# Patient Record
Sex: FEMALE | Race: WHITE | NOT HISPANIC OR LATINO | Employment: OTHER | URBAN - METROPOLITAN AREA
[De-identification: names, ages, dates, MRNs, and addresses within clinical notes are randomized per-mention and may not be internally consistent; named-entity substitution may affect disease eponyms.]

---

## 2017-01-23 ENCOUNTER — OFFICE VISIT (OUTPATIENT)
Dept: FAMILY MEDICINE | Facility: CLINIC | Age: 82
End: 2017-01-23
Payer: MEDICARE

## 2017-01-23 VITALS
WEIGHT: 119.94 LBS | HEIGHT: 59 IN | HEART RATE: 83 BPM | BODY MASS INDEX: 24.18 KG/M2 | DIASTOLIC BLOOD PRESSURE: 60 MMHG | OXYGEN SATURATION: 92 % | SYSTOLIC BLOOD PRESSURE: 100 MMHG

## 2017-01-23 DIAGNOSIS — L97.909 VENOUS STASIS ULCER: Primary | ICD-10-CM

## 2017-01-23 DIAGNOSIS — Z91.81 RISK FOR FALLS: ICD-10-CM

## 2017-01-23 DIAGNOSIS — Z78.0 ASYMPTOMATIC POSTMENOPAUSAL STATE: ICD-10-CM

## 2017-01-23 DIAGNOSIS — F41.9 ANXIETY: ICD-10-CM

## 2017-01-23 DIAGNOSIS — F13.20 BENZODIAZEPINE DEPENDENCE: ICD-10-CM

## 2017-01-23 DIAGNOSIS — I42.8 NICM (NONISCHEMIC CARDIOMYOPATHY): ICD-10-CM

## 2017-01-23 DIAGNOSIS — I83.009 VENOUS STASIS ULCER: Primary | ICD-10-CM

## 2017-01-23 DIAGNOSIS — S39.92XA BACK INJURY, INITIAL ENCOUNTER: ICD-10-CM

## 2017-01-23 PROCEDURE — 1157F ADVNC CARE PLAN IN RCRD: CPT | Mod: S$GLB,,,

## 2017-01-23 PROCEDURE — 99999 PR PBB SHADOW E&M-EST. PATIENT-LVL IV: CPT | Mod: PBBFAC,,,

## 2017-01-23 PROCEDURE — 1160F RVW MEDS BY RX/DR IN RCRD: CPT | Mod: S$GLB,,,

## 2017-01-23 PROCEDURE — 99204 OFFICE O/P NEW MOD 45 MIN: CPT | Mod: S$GLB,,,

## 2017-01-23 PROCEDURE — 1159F MED LIST DOCD IN RCRD: CPT | Mod: S$GLB,,,

## 2017-01-23 PROCEDURE — 1125F AMNT PAIN NOTED PAIN PRSNT: CPT | Mod: S$GLB,,,

## 2017-01-23 RX ORDER — TEMAZEPAM 7.5 MG/1
7.5 CAPSULE ORAL NIGHTLY PRN
Qty: 30 CAPSULE | Refills: 0 | Status: SHIPPED | OUTPATIENT
Start: 2017-01-23 | End: 2017-02-22

## 2017-01-23 RX ORDER — FUROSEMIDE 40 MG/1
40 TABLET ORAL 2 TIMES DAILY
Qty: 60 TABLET | Refills: 11 | Status: SHIPPED | OUTPATIENT
Start: 2017-01-23 | End: 2017-02-22 | Stop reason: SDUPTHER

## 2017-01-23 RX ORDER — LORAZEPAM 1 MG/1
1 TABLET ORAL DAILY
Qty: 30 TABLET | Refills: 0 | Status: SHIPPED | OUTPATIENT
Start: 2017-01-23 | End: 2017-03-13 | Stop reason: SDUPTHER

## 2017-01-23 RX ORDER — ESCITALOPRAM OXALATE 10 MG/1
10 TABLET ORAL DAILY
Qty: 30 TABLET | Refills: 11 | Status: SHIPPED | OUTPATIENT
Start: 2017-01-23 | End: 2018-02-20

## 2017-01-23 RX ORDER — ZINC GLUCONATE 50 MG
50 TABLET ORAL 2 TIMES DAILY
COMMUNITY
End: 2017-02-22

## 2017-01-23 RX ORDER — SPIRONOLACTONE 25 MG/1
25 TABLET ORAL DAILY
COMMUNITY
End: 2017-01-23 | Stop reason: SDUPTHER

## 2017-01-23 RX ORDER — SPIRONOLACTONE 25 MG/1
25 TABLET ORAL 2 TIMES DAILY
Qty: 60 TABLET | Refills: 11 | Status: SHIPPED | OUTPATIENT
Start: 2017-01-23 | End: 2017-02-22 | Stop reason: SDUPTHER

## 2017-01-23 RX ORDER — ESCITALOPRAM OXALATE 10 MG/1
10 TABLET ORAL NIGHTLY
Qty: 30 TABLET | Refills: 11 | Status: CANCELLED | OUTPATIENT
Start: 2017-01-23 | End: 2018-01-23

## 2017-01-23 NOTE — PROGRESS NOTES
Subjective:       Patient ID: Gloria Zuleta is a 87 y.o. female.    Chief Complaint: Wound Infection and Fall (back and hip pain)    HPI The patient has an ulcer on her right lower leg for 2 weeks but it started seeping about 3 days ago. She thinks she may have hit it.       She fell twice this weekend. She is taking temazepam and 15 mg and lorazepam 1 mg twice a day. She is protein malnourished.       She has CHF and reportedly has an EF of 15%.       She is complaining of pain in her lower thoracic to upper lumbar spine since falling.           Review of Systems   Constitutional: Positive for chills. Negative for fatigue, fever and unexpected weight change.   HENT: Negative.    Eyes: Negative.    Respiratory: Positive for cough, shortness of breath and wheezing.    Cardiovascular: Positive for palpitations and leg swelling. Negative for chest pain.   Gastrointestinal: Positive for constipation. Negative for blood in stool, diarrhea, nausea and vomiting.   Endocrine: Negative.    Genitourinary: Negative.    Musculoskeletal: Positive for arthralgias, joint swelling and myalgias.   Skin: Negative.    Allergic/Immunologic: Negative.    Neurological: Negative.  Negative for dizziness, seizures, syncope and light-headedness.   Hematological: Negative.    Psychiatric/Behavioral: Negative.    All other systems reviewed and are negative.      Objective:      Vitals:    01/23/17 1540   BP: 100/60   Pulse: 83     Physical Exam   Constitutional: She is oriented to person, place, and time. She appears well-developed and well-nourished. She is active.  Non-toxic appearance. She does not have a sickly appearance. She does not appear ill. No distress.   HENT:   Head: Normocephalic and atraumatic.   Right Ear: External ear normal.   Left Ear: External ear normal.   Nose: Nose normal.   Hearing normal.    Eyes: Conjunctivae are normal. Pupils are equal, round, and reactive to light.   Neck: Normal range of motion. Neck  supple. No thyroid mass and no thyromegaly present.   Cardiovascular: Normal rate, regular rhythm, normal heart sounds and intact distal pulses.  Exam reveals no gallop and no friction rub.    No murmur heard.  Pulses:       Dorsalis pedis pulses are 2+ on the right side, and 2+ on the left side.        Posterior tibial pulses are 2+ on the right side, and 2+ on the left side.   Pulmonary/Chest: Effort normal. No respiratory distress. She has wheezes. She has rales. She exhibits no tenderness.   Musculoskeletal: Normal range of motion. She exhibits edema and tenderness.   Over the lower thoracic spine.    Lymphadenopathy:     She has no cervical adenopathy.   Neurological: She is alert and oriented to person, place, and time. She has normal strength. Coordination and gait normal.   Skin: Skin is warm and dry. She is not diaphoretic. No pallor.   Psychiatric: She has a normal mood and affect. Her speech is normal and behavior is normal. Judgment and thought content normal. Cognition and memory are normal.   Vitals reviewed.                Assessment:       1. Venous stasis ulcer    2. Asymptomatic postmenopausal state    3. Back injury, initial encounter    4. Risk for falls    5. Benzodiazepine dependence    6. Anxiety    7. NICM (nonischemic cardiomyopathy)        Plan:       Venous stasis ulcer  -     Ambulatory referral to Home Health    Asymptomatic postmenopausal state  -     DXA Bone Density Spine And Hip; Future; Expected date: 1/23/17    Back injury, initial encounter  -     X-Ray Thoracic Spine AP Lateral; Future; Expected date: 1/23/17  -     X-Ray Lumbar Spine AP And Lateral; Future; Expected date: 1/23/17    Risk for falls    Benzodiazepine dependence    Anxiety  -     escitalopram oxalate (LEXAPRO) 10 MG tablet; Take 1 tablet (10 mg total) by mouth once daily.  Dispense: 30 tablet; Refill: 11    NICM (nonischemic cardiomyopathy)    Other orders  -     spironolactone (ALDACTONE) 25 MG tablet; Take 1  tablet (25 mg total) by mouth 2 (two) times daily.  Dispense: 60 tablet; Refill: 11  -     furosemide (LASIX) 40 MG tablet; Take 1 tablet (40 mg total) by mouth 2 (two) times daily.  Dispense: 60 tablet; Refill: 11  -     temazepam (RESTORIL) 7.5 MG Cap; Take 1 capsule (7.5 mg total) by mouth nightly as needed.  Dispense: 30 capsule; Refill: 0  -     lorazepam (ATIVAN) 1 MG tablet; Take 1 tablet (1 mg total) by mouth once daily.  Dispense: 30 tablet; Refill: 0  -     Cancel: escitalopram oxalate (LEXAPRO) 10 MG tablet; Take 1 tablet (10 mg total) by mouth every evening.  Dispense: 30 tablet; Refill: 11      Return in about 4 weeks (around 2/20/2017).

## 2017-01-23 NOTE — MR AVS SNAPSHOT
[facility]  [address]  [place] [address]  Phone: [phone]  Fax: [phone]                  Gloria Zuleta   2017 3:40 PM   Office Visit    Description:  Female : 1929   Provider:  Robert William Jr., MD   Department:  [facility]           Reason for Visit     Wound Infection     Fall           Diagnoses this Visit        Comments    Venous stasis ulcer    -  Primary     Asymptomatic postmenopausal state         Back injury, initial encounter         Risk for falls         Benzodiazepine dependence         Anxiety         NICM (nonischemic cardiomyopathy)                To Do List           Goals (5 Years of Data)     None      Follow-Up and Disposition     Return in about 4 weeks (around 2017).    Follow-up and Disposition History       These Medications        Disp Refills Start End    spironolactone (ALDACTONE) 25 MG tablet 60 tablet 11 2017     Take 1 tablet (25 mg total) by mouth 2 (two) times daily. - Oral    Pharmacy: [facility] [address] Ph #: [phone]       furosemide (LASIX) 40 MG tablet 60 tablet 11 2017     Take 1 tablet (40 mg total) by mouth 2 (two) times daily. - Oral    Pharmacy: [facility] [address] Ph #: [phone]       temazepam (RESTORIL) 7.5 MG Cap 30 capsule 0 2017    Take 1 capsule (7.5 mg total) by mouth nightly as needed. - Oral    Pharmacy: [facility] [address] Ph #: [phone]       lorazepam (ATIVAN) 1 MG tablet 30 tablet 0 2017     Take 1 tablet (1 mg total) by mouth once daily. - Oral    Pharmacy: [facility] [address] Ph #: [phone]       escitalopram oxalate (LEXAPRO) 10 MG tablet 30 tablet 11 2017    Take 1 tablet (10 mg total) by mouth once daily. - Oral    Pharmacy: [facility] [address]   #: [phone]         [facility]     [facility] Care [facility] - 24/7 Assistance  Registered nurses in the [facility] provide clinical advisement, health education, appointment booking, and other advisory services.  Call for this free service at [phone].             Medications           Message regarding Medications     Verify the changes and/or additions to your medication regime listed below are the same as discussed with your clinician today.  If any of these changes or additions are incorrect, please notify your healthcare provider.        START taking these NEW medications        Refills    spironolactone (ALDACTONE) 25 MG tablet 11    Sig: Take 1 tablet (25 mg total) by mouth 2 (two) times daily.    Class: Normal    Route: Oral    temazepam (RESTORIL) 7.5 MG Cap 0    Sig: Take 1 capsule (7.5 mg total) by mouth nightly as needed.    Class: Print    Route: Oral    escitalopram oxalate (LEXAPRO) 10 MG tablet 11    Sig: Take 1 tablet (10 mg total) by mouth once daily.    Class: Normal    Route: Oral      CHANGE how you are taking these medications     Start Taking Instead of    furosemide (LASIX) 40 MG tablet furosemide (LASIX) 40 MG tablet    Dosage:  Take 1 tablet (40 mg total) by mouth 2 (two) times daily. Dosage:  40 mg 2 (two) times daily.     Reason for Change:  Reorder     lorazepam (ATIVAN) 1 MG tablet lorazepam (ATIVAN) 1 MG tablet    Dosage:  Take 1 tablet (1 mg total) by mouth once daily. Dosage:  Take 1 mg by mouth 2 (two) times daily.    Reason for Change:  Reorder            Verify that the below list of medications is an accurate representation of the medications you are currently taking.  If none reported, the list may be blank. If incorrect, please contact your healthcare provider. Carry this list with you in case of emergency.           Current Medications     ascorbic acid, vitamin C, (VITAMIN C) 500 MG tablet Take 500 mg by mouth once daily.    aspirin 81 MG  "Chew Take 1 tablet (81 mg total) by mouth once daily.    atorvastatin (LIPITOR) 40 MG tablet Take 1 tablet (40 mg total) by mouth once daily.    clopidogrel (PLAVIX) 75 mg tablet every other day.     furosemide (LASIX) 40 MG tablet Take 1 tablet (40 mg total) by mouth 2 (two) times daily.    lactulose (CHRONULAC) 10 gram/15 mL solution     lisinopril 10 MG tablet Take 1 tablet (10 mg total) by mouth once daily.    lorazepam (ATIVAN) 1 MG tablet Take 1 tablet (1 mg total) by mouth once daily.    MULTIVIT-IRON-MIN-FOLIC ACID 3,500-18-0.4 UNIT-MG-MG ORAL CHEW Take by mouth.    MV,CA,MIN/IRON FUM/FA/VIT K (MULTI FOR HER ORAL) Take 1 tablet by mouth once daily.    potassium chloride SA (K-DUR,KLOR-CON) 20 MEQ tablet Take 1 tablet by mouth once daily.    spironolactone (ALDACTONE) 25 MG tablet Take 1 tablet (25 mg total) by mouth 2 (two) times daily.    tamsulosin (FLOMAX) 0.4 mg Cp24 Take 1 capsule (0.4 mg total) by mouth once daily.    tolterodine (DETROL LA) 4 MG 24 hr capsule Take 1 capsule (4 mg total) by mouth once daily.    zinc gluconate 50 mg tablet Take 50 mg by mouth 2 (two) times daily.    escitalopram oxalate (LEXAPRO) 10 MG tablet Take 1 tablet (10 mg total) by mouth once daily.    temazepam (RESTORIL) 7.5 MG Cap Take 1 capsule (7.5 mg total) by mouth nightly as needed.           Clinical Reference Information           Vital Signs - Last Recorded  Most recent update: 1/23/2017  3:48 PM by Philly Jean-Baptiste MA    BP Pulse Ht Wt SpO2 BMI    100/60 (BP Location: Right arm, Patient Position: Sitting, BP Method: Manual) 83 4' 11" (1.499 m) 54.4 kg (119 lb 14.9 oz) (!) 92% 24.22 kg/m2      Blood Pressure          Most Recent Value    BP  100/60      Allergies as of 1/23/2017     Bactrim [Sulfamethoxazole-trimethoprim]    Codeine    Cortisone    Keflex [Cephalexin]    Pcn [Penicillins]    Sulfadiazine    Sumycin 250      Immunizations Administered on Date of Encounter - 1/23/2017     None      Orders Placed During " Today's Visit      Normal Orders This Visit    Ambulatory referral to Home Health     Future Labs/Procedures Expected by Expires    DXA Bone Density Spine And Hip  1/23/2017 1/23/2018    X-Ray Lumbar Spine AP And Lateral  1/23/2017 1/23/2018    X-Ray Thoracic Spine AP Lateral  1/23/2017 1/23/2018

## 2017-01-25 ENCOUNTER — TELEPHONE (OUTPATIENT)
Dept: FAMILY MEDICINE | Facility: CLINIC | Age: 82
End: 2017-01-25

## 2017-01-25 NOTE — TELEPHONE ENCOUNTER
Home health nurse wants to what type compression, any medication to legs? Do you want Jose Duque?

## 2017-01-25 NOTE — TELEPHONE ENCOUNTER
----- Message from Nellie Gomes sent at 1/25/2017  9:42 AM CST -----  Contact: [facility].[phone]  At patient's home now.Need wound care orders asap.

## 2017-01-25 NOTE — TELEPHONE ENCOUNTER
Per nurse, there is not a sore significant enough for the insurance to pay for Unna boot. Pt care giver has been putting neosporin on the site and coban, do you want her to continue with that, please advise.

## 2017-01-25 NOTE — TELEPHONE ENCOUNTER
When I evaluated her she had a stasis ulcer and profuse serous drainage. I suggest compression dressings. If they are not willing to perform this service I will recommend a wound referral. Please advise.

## 2017-01-27 ENCOUNTER — TELEPHONE (OUTPATIENT)
Dept: FAMILY MEDICINE | Facility: CLINIC | Age: 82
End: 2017-01-27

## 2017-01-27 DIAGNOSIS — M54.50 ACUTE MIDLINE LOW BACK PAIN WITHOUT SCIATICA: Primary | ICD-10-CM

## 2017-01-27 NOTE — TELEPHONE ENCOUNTER
Change the dressings twice a week and don't do anything in between except elevate legs when possible.

## 2017-01-27 NOTE — TELEPHONE ENCOUNTER
Pt daughter calling to asking if they have to change the compression dressing 7 days a week? Stated the legs are not weeping anymore and insurance is only sending nurse twice a week to change is. What do they need to do in between?  Please advise.     Also the Temazepam was $131 for the script. Pts daughter wants to know how they can keep the cost down and if there is an alternate.

## 2017-01-27 NOTE — TELEPHONE ENCOUNTER
----- Message from Nellie Gomes sent at 1/27/2017 10:27 AM CST -----  Contact: [phone]  Calling about a medication that pharmacy would not fill.

## 2017-01-27 NOTE — TELEPHONE ENCOUNTER
It should be changed twice a week. If temazepam is too expensive it should be discontinued and she can take lorazepam at bedtime until it is discontinued.

## 2017-01-27 NOTE — TELEPHONE ENCOUNTER
Pt daughter calling to asking if they have to change the compression dressing 7 days a week? Legs are not weeping anymore, home health nurse visits twice a week to change is. What do they need to do in between? Please advise.

## 2017-01-30 NOTE — TELEPHONE ENCOUNTER
"Notified daughter in law and she asked, and what can be done after the CT spine is done for the "possible fracture", please advise  "

## 2017-01-30 NOTE — TELEPHONE ENCOUNTER
Simple. I tried to reduce temazepam and was told it was too effective. Since she is already taking lorazepam she should not have withdrawals. She should take the lorazepam once a day either in the morning or afternoon.

## 2017-01-30 NOTE — TELEPHONE ENCOUNTER
Per daughter in law, dr. ji did not want to discontinued temazepam until she slowly wean off, and the pt was suppose to take lorazepam in the am, what going to happen if she don't take temazepam? You want pt take lorazepam in morning and evening? She is very confused, please clarify

## 2017-01-31 DIAGNOSIS — R35.0 URINARY FREQUENCY: ICD-10-CM

## 2017-01-31 DIAGNOSIS — R35.1 NOCTURIA: ICD-10-CM

## 2017-01-31 RX ORDER — TOLTERODINE 4 MG/1
CAPSULE, EXTENDED RELEASE ORAL
Qty: 30 CAPSULE | Refills: 11 | Status: SHIPPED | OUTPATIENT
Start: 2017-01-31 | End: 2018-02-20

## 2017-01-31 RX ORDER — TAMSULOSIN HYDROCHLORIDE 0.4 MG/1
CAPSULE ORAL
Qty: 30 CAPSULE | Refills: 11 | Status: SHIPPED | OUTPATIENT
Start: 2017-01-31 | End: 2018-02-20

## 2017-02-01 ENCOUNTER — TELEPHONE (OUTPATIENT)
Dept: FAMILY MEDICINE | Facility: CLINIC | Age: 82
End: 2017-02-01

## 2017-02-01 NOTE — TELEPHONE ENCOUNTER
Notified Helga, she said she will stop temazepam and will call back if the pt experiences any problems.

## 2017-02-01 NOTE — TELEPHONE ENCOUNTER
----- Message from Coral Mosher sent at 2/1/2017 10:47 AM CST -----  Contact: Deplhine [phone]  Delphine with [employer] is calling to let the Dr know pt had a fall. Her oxygen level was 74 and after 20 minutes it is 98. Delphine can be reached at [phone].

## 2017-02-01 NOTE — TELEPHONE ENCOUNTER
Notified Daughter in law Helga that she can stop the temazepam but she said no, doctor told her not to stop cold turkey because she will get the shakes, and she will not stop the med because she doesn't want the patient to experience the shakes. Also she has not filled the 7.5mg temazepam because of the cost. Please advise.  She states pt still is taking 15mg temazepam nightly, escitalopram oxalate (LEXAPRO) 10 MG tablet, Take 1 tablet (10 mg total) by mouth in the evening and lorazepam in the morning.

## 2017-02-03 ENCOUNTER — TELEPHONE (OUTPATIENT)
Dept: FAMILY MEDICINE | Facility: CLINIC | Age: 82
End: 2017-02-03

## 2017-02-03 NOTE — TELEPHONE ENCOUNTER
----- Message from Coral Mosher sent at 2/3/2017  3:38 PM CST -----  Contact: Helga [phone]  Pt daughter in law Helga dumont refuse to do the CT because no one can tell her what treatment will be done if she takes the test. She stated the pt is elderly and has breast cancer and does not need to have this done. She can be reached at [phone].

## 2017-02-03 NOTE — TELEPHONE ENCOUNTER
If she has a fracture of her spine it could be treated with a procedure called kyphoplasty which can eliminate the pain. If she is not continuing to have severe pain in her spine then there would be little benefit to performing the procedure.

## 2017-02-07 NOTE — TELEPHONE ENCOUNTER
----- Message from Lizzette Salmeron sent at 2/7/2017  4:22 PM CST -----  Daughter in law// lv   [phone]  Calling for andrew     Needs refill of   Meds// CLOPIDOGREL 75 mg  Takes one every other day       Needs one month called to local pharmacy  [facility]  And a 3 month script [facility]

## 2017-02-08 ENCOUNTER — TELEPHONE (OUTPATIENT)
Dept: FAMILY MEDICINE | Facility: CLINIC | Age: 82
End: 2017-02-08

## 2017-02-08 RX ORDER — CLOPIDOGREL BISULFATE 75 MG/1
75 TABLET ORAL ONCE
Qty: 30 TABLET | Refills: 11 | Status: SHIPPED | OUTPATIENT
Start: 2017-02-08 | End: 2017-02-08 | Stop reason: SDUPTHER

## 2017-02-08 RX ORDER — CLOPIDOGREL BISULFATE 75 MG/1
75 TABLET ORAL ONCE
Qty: 90 TABLET | Refills: 3 | Status: SHIPPED | OUTPATIENT
Start: 2017-02-08 | End: 2018-01-27

## 2017-02-08 RX ORDER — CLOPIDOGREL BISULFATE 75 MG/1
75 TABLET ORAL ONCE
Qty: 90 TABLET | Refills: 3 | Status: SHIPPED | OUTPATIENT
Start: 2017-02-08 | End: 2017-02-08 | Stop reason: SDUPTHER

## 2017-02-08 NOTE — TELEPHONE ENCOUNTER
----- Message from Coral Mosher sent at 2/8/2017  2:29 PM CST -----  Contact: [facility] nurse [phone]  [facility] home health nurse is calling to get a script for a Roho Cushion for pt wheelchair. Please make sure name, diag, and account ([id number]) should be on script. Please fax to [facility] [phone].

## 2017-02-09 NOTE — TELEPHONE ENCOUNTER
I have spoken to Edie and she states patient is weak after fall yesterday and has o2 sats of 82 that mariama to 84% after deep breathing. Patient is hypotensive. i have advised edie on informing family she needs to be evaluated by ED. Edie verbalized understanding.

## 2017-02-09 NOTE — TELEPHONE ENCOUNTER
----- Message from Coral Mosher sent at 2/9/2017  3:50 PM CST -----  Contact: Brianne [phone]  Brianne with [facility] because pt is feeling weak, blood pressure 76/54 and 80/50 the next reading. Pulse ox is 82. Please call [phone].

## 2017-02-09 NOTE — TELEPHONE ENCOUNTER
----- Message from Lizzette Salmeron sent at 2/9/2017  9:57 AM CST -----  [facility]///Delphine   [phone]  Calling to report a fall     Last night               Bruising on ear and eye  Right side of body  No  external blood noted

## 2017-02-15 ENCOUNTER — TELEPHONE (OUTPATIENT)
Dept: FAMILY MEDICINE | Facility: CLINIC | Age: 82
End: 2017-02-15

## 2017-02-15 NOTE — TELEPHONE ENCOUNTER
Contact: [facility] nurse [phone]  [facility] home health nurse is calling to get a script for a Roho Cushion for pt wheelchair. Please make sure name, diag: wound buttocks/ bed sore and account ([id number]) should be on script.   Please fax to [facility] [phone].

## 2017-02-16 ENCOUNTER — TELEPHONE (OUTPATIENT)
Dept: FAMILY MEDICINE | Facility: CLINIC | Age: 82
End: 2017-02-16

## 2017-02-16 NOTE — TELEPHONE ENCOUNTER
Anca with [facility] phone# [phone], O2 stats @ room air sitting up is 78-87%, wheezing and cough. Deep breaths have not improved O2 stats. Also reports pt fell this am, hit side of head no laceration, denies headache or pain. Please advise

## 2017-02-16 NOTE — TELEPHONE ENCOUNTER
----- Message from Lizzette Salmeron sent at 2/16/2017  3:43 PM CST -----  [facility] calling  For nurse

## 2017-02-22 ENCOUNTER — OFFICE VISIT (OUTPATIENT)
Dept: FAMILY MEDICINE | Facility: CLINIC | Age: 82
End: 2017-02-22
Payer: MEDICARE

## 2017-02-22 ENCOUNTER — TELEPHONE (OUTPATIENT)
Dept: FAMILY MEDICINE | Facility: CLINIC | Age: 82
End: 2017-02-22

## 2017-02-22 VITALS
SYSTOLIC BLOOD PRESSURE: 90 MMHG | HEIGHT: 59 IN | DIASTOLIC BLOOD PRESSURE: 60 MMHG | OXYGEN SATURATION: 81 % | HEART RATE: 53 BPM

## 2017-02-22 DIAGNOSIS — F02.818 DEMENTIA ASSOCIATED WITH OTHER UNDERLYING DISEASE WITH BEHAVIORAL DISTURBANCE: ICD-10-CM

## 2017-02-22 DIAGNOSIS — I42.8 NICM (NONISCHEMIC CARDIOMYOPATHY): ICD-10-CM

## 2017-02-22 DIAGNOSIS — I25.10 CAD IN NATIVE ARTERY: Primary | ICD-10-CM

## 2017-02-22 PROBLEM — R32 URINARY INCONTINENCE: Status: ACTIVE | Noted: 2017-02-22

## 2017-02-22 PROBLEM — E87.5 HYPERKALEMIA: Status: ACTIVE | Noted: 2017-02-22

## 2017-02-22 PROBLEM — N17.9 AKI (ACUTE KIDNEY INJURY): Status: ACTIVE | Noted: 2017-02-22

## 2017-02-22 PROBLEM — I95.9 HYPOTENSION: Status: ACTIVE | Noted: 2017-02-22

## 2017-02-22 PROBLEM — E86.0 DEHYDRATION: Status: ACTIVE | Noted: 2017-02-22

## 2017-02-22 PROBLEM — R79.89 ELEVATED TROPONIN: Status: ACTIVE | Noted: 2017-02-22

## 2017-02-22 PROBLEM — K62.3 RECTAL PROLAPSE: Status: ACTIVE | Noted: 2017-02-22

## 2017-02-22 PROCEDURE — 99999 PR PBB SHADOW E&M-EST. PATIENT-LVL III: CPT | Mod: PBBFAC,,,

## 2017-02-22 PROCEDURE — 1159F MED LIST DOCD IN RCRD: CPT | Mod: S$GLB,,,

## 2017-02-22 PROCEDURE — 1160F RVW MEDS BY RX/DR IN RCRD: CPT | Mod: S$GLB,,,

## 2017-02-22 PROCEDURE — 99214 OFFICE O/P EST MOD 30 MIN: CPT | Mod: S$GLB,,,

## 2017-02-22 PROCEDURE — 1126F AMNT PAIN NOTED NONE PRSNT: CPT | Mod: S$GLB,,,

## 2017-02-22 PROCEDURE — 1157F ADVNC CARE PLAN IN RCRD: CPT | Mod: S$GLB,,,

## 2017-02-22 RX ORDER — FUROSEMIDE 40 MG/1
40 TABLET ORAL DAILY
Qty: 60 TABLET | Refills: 11 | Status: ON HOLD | OUTPATIENT
Start: 2017-02-22 | End: 2017-02-26

## 2017-02-22 RX ORDER — SPIRONOLACTONE 25 MG/1
25 TABLET ORAL DAILY
Qty: 60 TABLET | Refills: 11 | Status: ON HOLD | OUTPATIENT
Start: 2017-02-22 | End: 2017-03-15

## 2017-02-22 NOTE — MR AVS SNAPSHOT
[facility]  [address] [facility] [place] [place] [address]  Phone: [phone]  Fax: [phone]                  Gloria Zuleta   2017 8:20 AM   Office Visit    Description:  Female : 1929   Provider:  Robert William Jr., MD   Department:  [facility]           Reason for Visit     Follow-up           Diagnoses this Visit        Comments    CAD in native artery    -  Primary     NICM (nonischemic cardiomyopathy)         Dementia associated with other underlying disease with behavioral disturbance                To Do List           Future Appointments        Provider Department Dept Phone    3/1/2017 9:20 AM Al Whitley MD [facility] [phone]      Goals (5 Years of Data)     None      Follow-Up and Disposition     Return if symptoms worsen or fail to improve.    Follow-up and Disposition History       These Medications        Disp Refills Start End    spironolactone (ALDACTONE) 25 MG tablet 60 tablet 11 2017     Take 1 tablet (25 mg total) by mouth once daily. - Oral    Pharmacy: [facility] [address] Ph #: [phone]       furosemide (LASIX) 40 MG tablet 60 tablet 11 2017     Take 1 tablet (40 mg total) by mouth once daily. - Oral    [facility] [place] [facility] [address] Ph #: [phone]         [facility] On Call     [facility] On Call Nurse Care Line -  Assistance  Registered nurses in the [facility] On Call Center provide clinical advisement, health education, appointment booking, and other advisory services.  Call for this free service at [phone].             Medications           Message regarding Medications     Verify the changes and/or additions to your medication regime listed below are the same as discussed with your clinician today.  If any of these changes or additions are incorrect, please notify your healthcare provider.        CHANGE how you are  taking these medications     Start Taking Instead of    spironolactone (ALDACTONE) 25 MG tablet spironolactone (ALDACTONE) 25 MG tablet    Dosage:  Take 1 tablet (25 mg total) by mouth once daily. Dosage:  Take 1 tablet (25 mg total) by mouth 2 (two) times daily.    Reason for Change:  Reorder     furosemide (LASIX) 40 MG tablet furosemide (LASIX) 40 MG tablet    Dosage:  Take 1 tablet (40 mg total) by mouth once daily. Dosage:  Take 1 tablet (40 mg total) by mouth 2 (two) times daily.    Reason for Change:  Reorder       STOP taking these medications     zinc gluconate 50 mg tablet Take 50 mg by mouth 2 (two) times daily.    lactulose (CHRONULAC) 10 gram/15 mL solution     temazepam (RESTORIL) 7.5 MG Cap Take 1 capsule (7.5 mg total) by mouth nightly as needed.           Verify that the below list of medications is an accurate representation of the medications you are currently taking.  If none reported, the list may be blank. If incorrect, please contact your healthcare provider. Carry this list with you in case of emergency.           Current Medications     ascorbic acid, vitamin C, (VITAMIN C) 500 MG tablet Take 500 mg by mouth once daily.    aspirin 81 MG Chew Take 1 tablet (81 mg total) by mouth once daily.    atorvastatin (LIPITOR) 40 MG tablet Take 1 tablet (40 mg total) by mouth once daily.    clopidogrel (PLAVIX) 75 mg tablet Take 1 tablet (75 mg total) by mouth once.    escitalopram oxalate (LEXAPRO) 10 MG tablet Take 1 tablet (10 mg total) by mouth once daily.    furosemide (LASIX) 40 MG tablet Take 1 tablet (40 mg total) by mouth once daily.    lisinopril 10 MG tablet Take 1 tablet (10 mg total) by mouth once daily.    lorazepam (ATIVAN) 1 MG tablet Take 1 tablet (1 mg total) by mouth once daily.    MULTIVIT-IRON-MIN-FOLIC ACID 3,500-18-0.4 UNIT-MG-MG ORAL CHEW Take by mouth.    MV,CA,MIN/IRON FUM/FA/VIT K (MULTI FOR HER ORAL) Take 1 tablet by mouth once daily.    potassium chloride SA (K-DUR,KLOR-CON)  "20 MEQ tablet Take 1 tablet by mouth once daily.    spironolactone (ALDACTONE) 25 MG tablet Take 1 tablet (25 mg total) by mouth once daily.    tamsulosin (FLOMAX) 0.4 mg Cp24 TAKE ONE CAPSULE BY MOUTH DAILY.    tolterodine (DETROL LA) 4 MG 24 hr capsule TAKE ONE CAPSULE BY MOUTH DAILY.           Clinical Reference Information           Your Vitals Were     BP Pulse Height SpO2          90/60 (BP Location: Right arm, Patient Position: Sitting, BP Method: Manual) 53 4' 11" (1.499 m) 81%        Blood Pressure          Most Recent Value    BP  90/60      Allergies as of 2/22/2017     Bactrim [Sulfamethoxazole-trimethoprim]    Codeine    Cortisone    Keflex [Cephalexin]    Pcn [Penicillins]    Sulfadiazine    Sumycin 250      Immunizations Administered on Date of Encounter - 2/22/2017     None      [facility] Sign-Up     Activating your [facility] account is as easy as 1-2-3!     1) Visit [email or web address], select Sign Up Now, enter this activation code and your date of birth, then select Next.  Activation code not generated  Current Patient Portal Status: Account disabled      2) Create a username and password to use when you visit [facility] in the future and select a security question in case you lose your password and select Next.    3) Enter your e-mail address and click Sign Up!    Additional Information  If you have questions, please e-mail [email or web address] or call [phone] to talk to our [facility] staff. Remember, [facility] is NOT to be used for urgent needs. For medical emergencies, dial 911.         Language Assistance Services     ATTENTION: Language assistance services are available, free of charge. Please call [phone].      ATENCIÓN: Si habla español, tiene a de leon disposición servicios gratuitos de asistencia lingüística. Llame al [phone].     HAIR Ý: N?u b?n nói Ti?ng Vi?t, có các d?ch v? h? tr? ngôn ng? mi?n phí dành cho b?n. G?i s? [phone].         [facility] complies " with applicable Federal civil rights laws and does not discriminate on the basis of race, color, national origin, age, disability, or sex.

## 2017-02-22 NOTE — PROGRESS NOTES
Subjective:       Patient ID: Gloria Zuleta is a 87 y.o. female.    Chief Complaint: Follow-up    HPI The patient presents for medical management of her chronic medical problems including dementia, venous stasis, benzo dependency, hypertension and her other medical problems.     Review of Systems   Constitutional: Negative.    Respiratory: Negative.  Negative for shortness of breath.    Cardiovascular: Negative.  Negative for chest pain.   Psychiatric/Behavioral: Negative.    All other systems reviewed and are negative.      Objective:      Vitals:    02/22/17 0821   BP: 90/60   Pulse: (!) 53     Physical Exam   Constitutional: She is oriented to person, place, and time. She appears well-developed and well-nourished. She is active.  Non-toxic appearance. She does not have a sickly appearance. She does not appear ill. No distress.   HENT:   Head: Normocephalic and atraumatic.   Right Ear: External ear normal.   Left Ear: External ear normal.   Nose: Nose normal.   Hearing normal.    Eyes: Conjunctivae are normal. Pupils are equal, round, and reactive to light.   Neck: Normal range of motion. Neck supple. No thyroid mass and no thyromegaly present.   Cardiovascular: Normal rate, regular rhythm, normal heart sounds and intact distal pulses.  Exam reveals no gallop and no friction rub.    No murmur heard.  Pulses:       Dorsalis pedis pulses are 2+ on the right side, and 2+ on the left side.        Posterior tibial pulses are 2+ on the right side, and 2+ on the left side.   Pulmonary/Chest: Effort normal and breath sounds normal. No respiratory distress. She exhibits no tenderness.   Musculoskeletal: Normal range of motion. She exhibits no edema.   Lymphadenopathy:     She has no cervical adenopathy.   Neurological: She is alert and oriented to person, place, and time. She has normal strength. Coordination and gait normal.   Skin: Skin is warm and dry. She is not diaphoretic. No pallor.   Psychiatric: She has a  normal mood and affect. Her speech is normal and behavior is normal. Judgment and thought content normal. Cognition and memory are normal.   Vitals reviewed.      Assessment:       1. CAD in native artery    2. NICM (nonischemic cardiomyopathy)    3. Dementia associated with other underlying disease with behavioral disturbance        Plan:       CAD in native artery    NICM (nonischemic cardiomyopathy)    Dementia associated with other underlying disease with behavioral disturbance    Other orders  -     spironolactone (ALDACTONE) 25 MG tablet; Take 1 tablet (25 mg total) by mouth once daily.  Dispense: 60 tablet; Refill: 11  -     furosemide (LASIX) 40 MG tablet; Take 1 tablet (40 mg total) by mouth once daily.  Dispense: 60 tablet; Refill: 11      Return if symptoms worsen or fail to improve.

## 2017-02-22 NOTE — TELEPHONE ENCOUNTER
Anca with [employer] phone# [phone], O2 stat at room air is 74%, and she will try and get patient to go to the ER, in the past patient refused

## 2017-02-23 PROBLEM — E43 SEVERE MALNUTRITION: Status: ACTIVE | Noted: 2017-02-23

## 2017-02-23 PROBLEM — R82.90 ABNORMAL URINALYSIS: Status: ACTIVE | Noted: 2017-02-23

## 2017-02-24 PROBLEM — N39.0 URINARY TRACT INFECTION WITHOUT HEMATURIA: Status: ACTIVE | Noted: 2017-02-23

## 2017-02-26 PROBLEM — I95.9 HYPOTENSION: Status: RESOLVED | Noted: 2017-02-22 | Resolved: 2017-02-26

## 2017-02-26 PROBLEM — E87.5 HYPERKALEMIA: Status: RESOLVED | Noted: 2017-02-22 | Resolved: 2017-02-26

## 2017-03-06 ENCOUNTER — TELEPHONE (OUTPATIENT)
Dept: FAMILY MEDICINE | Facility: CLINIC | Age: 82
End: 2017-03-06

## 2017-03-06 RX ORDER — CARVEDILOL 3.12 MG/1
3.12 TABLET ORAL 2 TIMES DAILY
Refills: 11 | COMMUNITY
Start: 2017-02-20 | End: 2018-02-20

## 2017-03-06 RX ORDER — LOSARTAN POTASSIUM 25 MG/1
TABLET ORAL
Refills: 11 | COMMUNITY
Start: 2017-02-20 | End: 2017-03-13

## 2017-03-06 NOTE — TELEPHONE ENCOUNTER
----- Message from Nellie Gomes sent at 3/6/2017 10:15 AM CST -----  Contact: daughter.[phone]  Checking on papers brought in Friday that need filling in.need papers back today.

## 2017-03-10 ENCOUNTER — TELEPHONE (OUTPATIENT)
Dept: FAMILY MEDICINE | Facility: CLINIC | Age: 82
End: 2017-03-10

## 2017-03-10 DIAGNOSIS — Z11.1 SCREENING-PULMONARY TB: Primary | ICD-10-CM

## 2017-03-10 NOTE — TELEPHONE ENCOUNTER
----- Message from Coral Mosher sent at 3/10/2017  1:10 PM CST -----  Contact: Helga ifsher [phone]  Pt has an appt on Monday and would like to have a chest xray and Tb test done prior to appt. Please call Helga at [phone].

## 2017-03-10 NOTE — TELEPHONE ENCOUNTER
----- Message from Poppy Humphrey LPN sent at 3/10/2017  4:59 PM CST -----  Need TB blood test order changed

## 2017-03-13 ENCOUNTER — TELEPHONE (OUTPATIENT)
Dept: FAMILY MEDICINE | Facility: CLINIC | Age: 82
End: 2017-03-13

## 2017-03-13 ENCOUNTER — OFFICE VISIT (OUTPATIENT)
Dept: FAMILY MEDICINE | Facility: CLINIC | Age: 82
End: 2017-03-13
Payer: MEDICARE

## 2017-03-13 VITALS
HEIGHT: 59 IN | HEART RATE: 46 BPM | SYSTOLIC BLOOD PRESSURE: 90 MMHG | DIASTOLIC BLOOD PRESSURE: 60 MMHG | OXYGEN SATURATION: 88 %

## 2017-03-13 DIAGNOSIS — K62.3 RECTAL PROLAPSE: ICD-10-CM

## 2017-03-13 DIAGNOSIS — Z29.9 PREVENTIVE MEASURE: ICD-10-CM

## 2017-03-13 DIAGNOSIS — N17.9 ACUTE KIDNEY INJURY: Primary | ICD-10-CM

## 2017-03-13 DIAGNOSIS — I42.8 NICM (NONISCHEMIC CARDIOMYOPATHY): ICD-10-CM

## 2017-03-13 PROBLEM — N39.0 URINARY TRACT INFECTION WITHOUT HEMATURIA: Status: RESOLVED | Noted: 2017-02-23 | Resolved: 2017-03-13

## 2017-03-13 PROBLEM — R79.89 ELEVATED TROPONIN: Status: RESOLVED | Noted: 2017-02-22 | Resolved: 2017-03-13

## 2017-03-13 PROBLEM — E43 SEVERE MALNUTRITION: Status: RESOLVED | Noted: 2017-02-23 | Resolved: 2017-03-13

## 2017-03-13 PROCEDURE — 99214 OFFICE O/P EST MOD 30 MIN: CPT | Mod: S$GLB,,,

## 2017-03-13 PROCEDURE — 1126F AMNT PAIN NOTED NONE PRSNT: CPT | Mod: S$GLB,,,

## 2017-03-13 PROCEDURE — 90670 PCV13 VACCINE IM: CPT | Mod: S$GLB,,,

## 2017-03-13 PROCEDURE — 1159F MED LIST DOCD IN RCRD: CPT | Mod: S$GLB,,,

## 2017-03-13 PROCEDURE — 1157F ADVNC CARE PLAN IN RCRD: CPT | Mod: S$GLB,,,

## 2017-03-13 PROCEDURE — 1160F RVW MEDS BY RX/DR IN RCRD: CPT | Mod: S$GLB,,,

## 2017-03-13 PROCEDURE — G0009 ADMIN PNEUMOCOCCAL VACCINE: HCPCS | Mod: S$GLB,,,

## 2017-03-13 PROCEDURE — 99999 PR PBB SHADOW E&M-EST. PATIENT-LVL III: CPT | Mod: PBBFAC,,,

## 2017-03-13 RX ORDER — LORAZEPAM 0.5 MG/1
0.5 TABLET ORAL DAILY
Qty: 30 TABLET | Refills: 0 | Status: SHIPPED | OUTPATIENT
Start: 2017-03-13 | End: 2018-02-20

## 2017-03-13 RX ORDER — FERROUS SULFATE 325(65) MG
325 TABLET ORAL
Refills: 0 | COMMUNITY
Start: 2017-03-13 | End: 2018-02-20

## 2017-03-13 NOTE — MR AVS SNAPSHOT
[facility]  [address]  [place] [address]  Phone: [phone]  Fax: [phone]                  Gloria Zuleta   3/13/2017 3:20 PM   Office Visit    Description:  Female : 1929   Provider:  Robert William Jr., MD   Department:  [facility]           Reason for Visit     Hospital Follow Up           Diagnoses this Visit        Comments    Acute kidney injury    -  Primary     NICM (nonischemic cardiomyopathy)         Rectal prolapse         Preventive measure                To Do List           Goals (5 Years of Data)     None      Follow-Up and Disposition     Return in about 4 weeks (around 4/10/2017).       These Medications        Disp Refills Start End    lorazepam (ATIVAN) 0.5 MG tablet 30 tablet 0 3/13/2017     Take 1 tablet (0.5 mg total) by mouth once daily. - Oral    Pharmacy: [facility] [address] Ph #: [phone]         PURCHASE these Medications (No prescription required)        Start End    ferrous sulfate 325 mg (65 mg iron) Tab tablet 3/13/2017     Sig - Route: Take 1 tablet (325 mg total) by mouth daily with breakfast. - Oral    Class: OTC      [facility]     [facility] Care [facility] -  Assistance  Registered nurses in the [facility] provide clinical advisement, health education, appointment booking, and other advisory services.  Call for this free service at [phone].             Medications           Message regarding Medications     Verify the changes and/or additions to your medication regime listed below are the same as discussed with your clinician today.  If any of these changes or additions are incorrect, please notify your healthcare provider.        START taking these NEW medications        Refills    ferrous sulfate 325 mg (65 mg iron) Tab tablet 0    Sig: Take 1 tablet (325 mg total) by mouth daily with breakfast.    Class: OTC    Route: Oral       CHANGE how you are taking these medications     Start Taking Instead of    lorazepam (ATIVAN) 0.5 MG tablet lorazepam (ATIVAN) 1 MG tablet    Dosage:  Take 1 tablet (0.5 mg total) by mouth once daily. Dosage:  Take 1 tablet (1 mg total) by mouth once daily.    Reason for Change:  Reorder       STOP taking these medications     losartan (COZAAR) 25 MG tablet            Verify that the below list of medications is an accurate representation of the medications you are currently taking.  If none reported, the list may be blank. If incorrect, please contact your healthcare provider. Carry this list with you in case of emergency.           Current Medications     ascorbic acid, vitamin C, (VITAMIN C) 500 MG tablet Take 500 mg by mouth once daily.    aspirin 81 MG Chew Take 1 tablet (81 mg total) by mouth once daily.    atorvastatin (LIPITOR) 40 MG tablet Take 1 tablet (40 mg total) by mouth once daily.    carvedilol (COREG) 3.125 MG tablet 3.125 mg 2 (two) times daily.     escitalopram oxalate (LEXAPRO) 10 MG tablet Take 1 tablet (10 mg total) by mouth once daily.    furosemide (LASIX) 20 MG tablet Take 1 tablet (20 mg total) by mouth once daily.    lisinopril (PRINIVIL,ZESTRIL) 5 MG tablet Take 1 tablet (5 mg total) by mouth once daily.    lorazepam (ATIVAN) 0.5 MG tablet Take 1 tablet (0.5 mg total) by mouth once daily.    MULTIVIT-IRON-MIN-FOLIC ACID 3,500-18-0.4 UNIT-MG-MG ORAL CHEW Take by mouth.    spironolactone (ALDACTONE) 25 MG tablet Take 1 tablet (25 mg total) by mouth once daily.    tamsulosin (FLOMAX) 0.4 mg Cp24 TAKE ONE CAPSULE BY MOUTH DAILY.    tolterodine (DETROL LA) 4 MG 24 hr capsule TAKE ONE CAPSULE BY MOUTH DAILY.    clopidogrel (PLAVIX) 75 mg tablet Take 1 tablet (75 mg total) by mouth once.    ferrous sulfate 325 mg (65 mg iron) Tab tablet Take 1 tablet (325 mg total) by mouth daily with breakfast.    potassium bicarbonate (K-LYTE) disintegrating tablet Take 25 mEq by mouth 2 (two) times daily.  "          Clinical Reference Information           Your Vitals Were     BP Pulse Height SpO2          90/60 (BP Location: Right arm, Patient Position: Sitting) 46 4' 11" (1.499 m) 88%        Blood Pressure          Most Recent Value    BP  90/60      Allergies as of 3/13/2017     Bactrim [Sulfamethoxazole-trimethoprim]    Codeine    Cortisone    Keflex [Cephalexin]    Pcn [Penicillins]    Sulfadiazine    Sumycin 250      Immunizations Administered on Date of Encounter - 3/13/2017     Name Date Dose VIS Date Route    Pneumococcal Conjugate - 13 Valent  Incomplete 0.5 mL 11/5/2015 Intramuscular      Orders Placed During Today's Visit      Normal Orders This Visit    Pneumococcal Conjugate Vaccine (13 Valent) (IM)       Language Assistance Services     ATTENTION: Language assistance services are available, free of charge. Please call [phone].      ATENCIÓN: Si edgardola ana, tiene a de leon disposición servicios gratuitos de asistencia lingüística. Llame al [phone].     CHÚ Ý: N?u b?n nói Ti?ng Vi?t, có các d?ch v? h? tr? ngôn ng? mi?n phí dành cho b?n. G?i s? [phone].         [facility] complies with applicable Federal civil rights laws and does not discriminate on the basis of race, color, national origin, age, disability, or sex.        "

## 2017-03-13 NOTE — PROGRESS NOTES
Subjective:       Patient ID: Gloria Zuleta is a 87 y.o. female.    Chief Complaint: Hospital Follow Up    HPI  The patient presents after a recent hospitalization at [facility] on February 22, 2017. She had acute kidney injury and hyperkalemia. Her malnutrition has improved.     Review of Systems   Constitutional: Positive for appetite change. Negative for activity change and unexpected weight change.   HENT: Negative.  Negative for congestion, trouble swallowing and voice change.    Eyes: Negative.    Respiratory: Negative.  Negative for cough and shortness of breath.    Cardiovascular: Negative.  Negative for chest pain.   Gastrointestinal: Positive for abdominal pain.        C/O black stools   Not anemic    Endocrine: Negative.    Genitourinary: Negative.    Musculoskeletal: Negative.    Skin: Negative.    Allergic/Immunologic: Negative.    Neurological: Negative.    Hematological: Negative.    Psychiatric/Behavioral: Positive for dysphoric mood. Negative for agitation.   All other systems reviewed and are negative.      Objective:      Vitals:    03/13/17 1600   BP: 90/60   Pulse: (!) 46     Physical Exam   Constitutional: She is oriented to person, place, and time. She appears well-developed and well-nourished. She is active.  Non-toxic appearance. She does not have a sickly appearance. She does not appear ill. No distress.   HENT:   Head: Normocephalic and atraumatic.   Right Ear: External ear normal.   Left Ear: External ear normal.   Nose: Nose normal.   Hearing normal.    Eyes: Conjunctivae are normal. Pupils are equal, round, and reactive to light.   Neck: Normal range of motion. Neck supple. No thyroid mass and no thyromegaly present.   Cardiovascular: Normal rate, regular rhythm, normal heart sounds and intact distal pulses.  Exam reveals no gallop and no friction rub.    No murmur heard.  Pulses:       Dorsalis pedis pulses are 2+ on the right side, and 2+ on the left side.         Posterior tibial pulses are 2+ on the right side, and 2+ on the left side.   Pulmonary/Chest: Effort normal and breath sounds normal. No respiratory distress. She exhibits no tenderness.   Musculoskeletal: Normal range of motion. She exhibits no edema.   Lymphadenopathy:     She has no cervical adenopathy.   Neurological: She is alert and oriented to person, place, and time. She has normal strength. Coordination and gait normal.   Skin: Skin is warm and dry. She is not diaphoretic. No pallor.   Psychiatric: She has a normal mood and affect. Her speech is normal and behavior is normal. Judgment and thought content normal. Cognition and memory are normal.   Vitals reviewed.      Assessment:       1. Acute kidney injury    2. NICM (nonischemic cardiomyopathy)    3. Rectal prolapse    4. Preventive measure        Plan:       Acute kidney injury    NICM (nonischemic cardiomyopathy)    Rectal prolapse    Preventive measure  -     Pneumococcal Conjugate Vaccine (13 Valent) (IM)    Other orders  -     lorazepam (ATIVAN) 0.5 MG tablet; Take 1 tablet (0.5 mg total) by mouth once daily.  Dispense: 30 tablet; Refill: 0  -     ferrous sulfate 325 mg (65 mg iron) Tab tablet; Take 1 tablet (325 mg total) by mouth daily with breakfast.; Refill: 0      Return in about 4 weeks (around 4/10/2017).      Reduce alprazolam again then discontinue

## 2017-03-14 PROBLEM — L89.152 SACRAL DECUBITUS ULCER, STAGE II: Status: ACTIVE | Noted: 2017-03-14

## 2017-03-15 ENCOUNTER — TELEPHONE (OUTPATIENT)
Dept: FAMILY MEDICINE | Facility: CLINIC | Age: 82
End: 2017-03-15

## 2017-03-22 ENCOUNTER — TELEPHONE (OUTPATIENT)
Dept: FAMILY MEDICINE | Facility: CLINIC | Age: 82
End: 2017-03-22

## 2017-03-22 NOTE — TELEPHONE ENCOUNTER
----- Message from Coral Mosher sent at 3/22/2017 10:18 AM CDT -----  Contact: Manjinder mcwilliams [phone]  Pt is check the status on paperwork for nursing home. Please call him at [phone].

## 2017-03-23 ENCOUNTER — TELEPHONE (OUTPATIENT)
Dept: FAMILY MEDICINE | Facility: CLINIC | Age: 82
End: 2017-03-23

## 2017-03-23 NOTE — TELEPHONE ENCOUNTER
----- Message from Lizzette Salmeron sent at 3/23/2017  1:03 PM CDT -----  Son//Manjinder is calling again about paper work to be admitted to nursing home   States brought this about a week ago and would like to pick it up     Reach at   [phone]     Home//[phone]

## 2017-03-27 ENCOUNTER — TELEPHONE (OUTPATIENT)
Dept: FAMILY MEDICINE | Facility: CLINIC | Age: 82
End: 2017-03-27

## 2017-03-27 NOTE — TELEPHONE ENCOUNTER
----- Message from Lizzette Salmeron sent at 3/27/2017  2:16 PM CDT -----  Wants doctor to note patient has a bed sore on her rectum that has been there for years   Wants doctor to note this so it can be treated when admitted  Per the son

## 2017-03-27 NOTE — TELEPHONE ENCOUNTER
----- Message from Nellie Gomes sent at 3/27/2017  8:13 AM CDT -----  Contact: poppy.[phone]  Calling about paperwork for patient to be admitted to nursing home.papers have been here for 10 days.

## 2017-04-05 ENCOUNTER — TELEPHONE (OUTPATIENT)
Dept: FAMILY MEDICINE | Facility: CLINIC | Age: 82
End: 2017-04-05

## 2017-04-05 RX ORDER — LOSARTAN POTASSIUM 25 MG/1
TABLET ORAL
Refills: 11 | COMMUNITY
Start: 2017-03-17 | End: 2018-02-20

## 2017-04-05 NOTE — TELEPHONE ENCOUNTER
----- Message from Coral Mosher sent at 4/5/2017  1:44 PM CDT -----  Contact: pt [phone]  Sharonda with [facility] is calling to get information about pt having 32 oz fluid retention. Please call her at [phone].

## 2017-04-05 NOTE — TELEPHONE ENCOUNTER
Blank was unavailable notified Kassidy that Dr. William did not order fluid restriction, she verbalized understanding.

## 2017-04-05 NOTE — TELEPHONE ENCOUNTER
----- Message from Lizzette Salmeron sent at 4/5/2017 10:23 AM CDT -----  [facility] called ----Blank    [phone]  Blank calling to find out if this patient is on a 32 ounce daily fluid restriction diet

## 2017-08-25 ENCOUNTER — TELEPHONE (OUTPATIENT)
Dept: FAMILY MEDICINE | Facility: CLINIC | Age: 82
End: 2017-08-25

## 2017-08-25 NOTE — TELEPHONE ENCOUNTER
----- Message from Nellie Goems sent at 8/25/2017  1:56 PM CDT -----  Contact: daughter in law.[phone]  Need immunization records.has she had shingles or pneumonia shots.

## 2018-02-14 ENCOUNTER — TELEPHONE (OUTPATIENT)
Dept: FAMILY MEDICINE | Facility: CLINIC | Age: 83
End: 2018-02-14

## 2018-02-14 NOTE — TELEPHONE ENCOUNTER
----- Message from Shun Woodard sent at 2/14/2018 12:18 PM CST -----  Contact: [phone] Helga pt daughter  Patient daughter wanted to speak with the nurse to verify if the patient need to do blood work prior to her appt on 02/20. Please call and advise.

## 2018-02-20 ENCOUNTER — OFFICE VISIT (OUTPATIENT)
Dept: FAMILY MEDICINE | Facility: CLINIC | Age: 83
End: 2018-02-20
Payer: MEDICARE

## 2018-02-20 VITALS
HEART RATE: 68 BPM | SYSTOLIC BLOOD PRESSURE: 110 MMHG | OXYGEN SATURATION: 97 % | HEIGHT: 59 IN | DIASTOLIC BLOOD PRESSURE: 64 MMHG

## 2018-02-20 DIAGNOSIS — Z86.73 HISTORY OF CVA IN ADULTHOOD: ICD-10-CM

## 2018-02-20 DIAGNOSIS — I10 ESSENTIAL HYPERTENSION: ICD-10-CM

## 2018-02-20 DIAGNOSIS — I42.8 NICM (NONISCHEMIC CARDIOMYOPATHY): ICD-10-CM

## 2018-02-20 DIAGNOSIS — I25.10 CAD IN NATIVE ARTERY: ICD-10-CM

## 2018-02-20 DIAGNOSIS — F13.20 BENZODIAZEPINE DEPENDENCE: ICD-10-CM

## 2018-02-20 DIAGNOSIS — F02.818 DEMENTIA ASSOCIATED WITH OTHER UNDERLYING DISEASE WITH BEHAVIORAL DISTURBANCE: Primary | ICD-10-CM

## 2018-02-20 PROBLEM — I95.9 HYPOTENSION: Status: RESOLVED | Noted: 2017-02-22 | Resolved: 2018-02-20

## 2018-02-20 PROBLEM — R32 URINARY INCONTINENCE: Status: RESOLVED | Noted: 2017-02-22 | Resolved: 2018-02-20

## 2018-02-20 PROBLEM — N17.9 AKI (ACUTE KIDNEY INJURY): Status: RESOLVED | Noted: 2017-02-22 | Resolved: 2018-02-20

## 2018-02-20 PROBLEM — L89.152 SACRAL DECUBITUS ULCER, STAGE II: Status: RESOLVED | Noted: 2017-03-14 | Resolved: 2018-02-20

## 2018-02-20 PROCEDURE — 99397 PER PM REEVAL EST PAT 65+ YR: CPT | Mod: S$GLB,,,

## 2018-02-20 PROCEDURE — 99999 PR PBB SHADOW E&M-EST. PATIENT-LVL III: CPT | Mod: PBBFAC,,,

## 2018-02-20 RX ORDER — ATORVASTATIN CALCIUM 40 MG/1
40 TABLET, FILM COATED ORAL DAILY
COMMUNITY
Start: 2018-01-31 | End: 2018-10-18 | Stop reason: SDUPTHER

## 2018-02-20 RX ORDER — VENLAFAXINE HYDROCHLORIDE 150 MG/1
75 CAPSULE, EXTENDED RELEASE ORAL DAILY
COMMUNITY
End: 2018-02-21

## 2018-02-20 RX ORDER — FOLIC ACID 1 MG/1
1 TABLET ORAL DAILY
COMMUNITY
End: 2018-10-18

## 2018-02-20 RX ORDER — AMLODIPINE BESYLATE 5 MG/1
5 TABLET ORAL DAILY
COMMUNITY
End: 2018-02-21

## 2018-02-20 RX ORDER — RISPERIDONE 0.25 MG/1
TABLET ORAL
COMMUNITY
End: 2018-02-21

## 2018-02-20 RX ORDER — METOPROLOL SUCCINATE 100 MG/1
100 TABLET, EXTENDED RELEASE ORAL DAILY
COMMUNITY
End: 2018-02-21

## 2018-02-20 RX ORDER — DONEPEZIL HYDROCHLORIDE 10 MG/1
10 TABLET, FILM COATED ORAL NIGHTLY
COMMUNITY
End: 2018-02-21

## 2018-02-20 RX ORDER — CLOPIDOGREL BISULFATE 75 MG/1
75 TABLET ORAL
COMMUNITY
Start: 2018-02-14 | End: 2018-02-21

## 2018-02-20 RX ORDER — LEVOTHYROXINE SODIUM 100 UG/1
100 TABLET ORAL DAILY
COMMUNITY
End: 2018-02-21

## 2018-02-20 NOTE — PROGRESS NOTES
Subjective:       Patient ID: Gloria Zuleta is a 88 y.o. female.    Chief Complaint: No chief complaint on file.    HPI Moved into [facility] a year ago. She is on lorazepam 1 mg twice a day. The patient presents for medical management of her chronic medical problems including dementia, hypertension, CAD and her other chronic medical problems. She is now wheelchair bound for safety reasons. The only change in her health is that she was evaluated in the ER at [facility] for a fall and head injury when she fell forward out of her wheelchair and hit her head. She is on aspirin and plavix due to cardiomyopathy and remote CVA. She will consult with Dr. Jaime regarding continuing therapy tomorrow.     Review of Systems   Constitutional: Negative.    Respiratory: Negative.  Negative for shortness of breath.    Cardiovascular: Negative.  Negative for chest pain.   Psychiatric/Behavioral: Negative.    All other systems reviewed and are negative.      Objective:      Vitals:    02/20/18 0746   BP: 110/64   Pulse: 68     Physical Exam   Constitutional: She is oriented to person, place, and time. She appears well-developed and well-nourished. She is active.  Non-toxic appearance. She does not have a sickly appearance. She does not appear ill. No distress.   HENT:   Head: Normocephalic and atraumatic.   Right Ear: External ear normal.   Left Ear: External ear normal.   Nose: Nose normal.   Hearing normal.    Eyes: Conjunctivae are normal. Pupils are equal, round, and reactive to light.   Neck: Normal range of motion. Neck supple. No thyroid mass and no thyromegaly present.   Cardiovascular: Normal rate, regular rhythm, normal heart sounds and intact distal pulses.  Exam reveals no gallop and no friction rub.    No murmur heard.  Pulses:       Dorsalis pedis pulses are 2+ on the right side, and 2+ on the left side.        Posterior tibial pulses are 2+ on the right side, and 2+ on the left side.    Pulmonary/Chest: Effort normal and breath sounds normal. No respiratory distress. She exhibits no tenderness.   Musculoskeletal: Normal range of motion. She exhibits no edema.   Lymphadenopathy:     She has no cervical adenopathy.   Neurological: She is alert and oriented to person, place, and time. She has normal strength. Coordination and gait normal.   Skin: Skin is warm and dry. She is not diaphoretic. No pallor.   Psychiatric: She has a normal mood and affect. Her speech is normal and behavior is normal. Judgment and thought content normal. Cognition and memory are normal.   Vitals reviewed.        Data obtained from Agency for Healthcare and Research Quality ([facility]    GRADE A -The USPSTF recommends the service. There is high certainty that the net benefit is substantial. Offer or provide this service.    GRADE B - The USPSTF recommends the service. There is high certainty that the net benefit is moderate or there is moderate certainty that the net benefit is moderate to substantial. Offer or provide this service.    View All     12 - Recommended (A, B)   Grade Title Risk Info. Details   Controlled  High Blood Pressure: Screening and Home Monitoring -- Adults     N/A  Alcohol Misuse: Screening and Behavioral Counseling Interventions in Primary Care -- Adults     N/A  BRCA-Related Cancer: Risk Assessment, Genetic Counseling, & Genetic Testing -- Women at Increased Risk     N/A  Breast Cancer: Preventive Medications -- Women At Increased Risk     No  Depression: Screening -- General adult population, including pregnant and postpartum women     At risk due to aspirin and plavix Fall Prevention --Exercise/Physical Therapy ANDVitamin D Supplementation: Community-dwelling Adults 65 Years or Older, Increased Risk for Falls     Yes  Healthful Diet and Physical Activity for CVD Disease Prevention: Counseling -- Adults with CVD Risk Factors     Low  Hepatitis B: Screening -- Nonpregnant Adolescents and Adults At High  Risk     Not a candidate  Hepatitis C Virus Infection: Screening--Adults at High Risk and Adults born between 1945 and 1965     UTD  Latent Tuberculosis Infection: Screening -- Asymptomatic adults at increased risk for infection     No  Obesity: Screening for and Management of-- All Adults        Osteoporosis: Screening -- Women 65+ and Younger Women at Increased Risk         Assessment:       1. Dementia associated with other underlying disease with behavioral disturbance    2. CAD in native artery    3. Essential hypertension    4. History of CVA in adulthood    5. NICM (nonischemic cardiomyopathy)    6. Benzodiazepine dependence        Plan:       Dementia associated with other underlying disease with behavioral disturbance    CAD in native artery    Essential hypertension    History of CVA in adulthood    NICM (nonischemic cardiomyopathy)    Benzodiazepine dependence    Suggest gradual reduction of lorazepam to 0.5 mg bid or tid and eventual discontinuation     Follow-up in about 6 months (around 8/20/2018).       Attending Only

## 2018-02-21 ENCOUNTER — TELEPHONE (OUTPATIENT)
Dept: FAMILY MEDICINE | Facility: CLINIC | Age: 83
End: 2018-02-21

## 2018-02-21 ENCOUNTER — OFFICE VISIT (OUTPATIENT)
Dept: FAMILY MEDICINE | Facility: CLINIC | Age: 83
End: 2018-02-21
Payer: MEDICARE

## 2018-02-21 DIAGNOSIS — F02.818 DEMENTIA ASSOCIATED WITH OTHER UNDERLYING DISEASE WITH BEHAVIORAL DISTURBANCE: ICD-10-CM

## 2018-02-21 DIAGNOSIS — I25.10 CAD IN NATIVE ARTERY: Primary | ICD-10-CM

## 2018-02-21 DIAGNOSIS — I42.8 NICM (NONISCHEMIC CARDIOMYOPATHY): ICD-10-CM

## 2018-02-21 DIAGNOSIS — I10 ESSENTIAL HYPERTENSION: ICD-10-CM

## 2018-02-21 DIAGNOSIS — E78.00 PURE HYPERCHOLESTEROLEMIA: ICD-10-CM

## 2018-02-21 PROBLEM — F13.20 BENZODIAZEPINE DEPENDENCE: Status: RESOLVED | Noted: 2018-02-20 | Resolved: 2018-02-21

## 2018-02-21 PROCEDURE — 3008F BODY MASS INDEX DOCD: CPT | Mod: S$GLB,,,

## 2018-02-21 PROCEDURE — 1159F MED LIST DOCD IN RCRD: CPT | Mod: S$GLB,,,

## 2018-02-21 PROCEDURE — 99999 PR PBB SHADOW E&M-EST. PATIENT-LVL II: CPT | Mod: PBBFAC,,,

## 2018-02-21 PROCEDURE — 99212 OFFICE O/P EST SF 10 MIN: CPT | Mod: S$GLB,,,

## 2018-02-21 RX ORDER — LACTULOSE 10 G/15ML
10 SOLUTION ORAL 3 TIMES DAILY
Qty: 450 ML | Refills: 11
Start: 2018-02-21 | End: 2018-03-03

## 2018-02-21 RX ORDER — TOLTERODINE 4 MG/1
4 CAPSULE, EXTENDED RELEASE ORAL DAILY
Qty: 30 CAPSULE | Refills: 11
Start: 2018-02-21 | End: 2018-10-18 | Stop reason: SDUPTHER

## 2018-02-21 RX ORDER — TALC
POWDER (GRAM) TOPICAL NIGHTLY
COMMUNITY
End: 2018-10-18

## 2018-02-21 RX ORDER — TAMSULOSIN HYDROCHLORIDE 0.4 MG/1
0.4 CAPSULE ORAL DAILY
Qty: 30 CAPSULE | Refills: 11
Start: 2018-02-21 | End: 2018-10-18 | Stop reason: SDUPTHER

## 2018-02-21 RX ORDER — SPIRONOLACTONE 25 MG/1
25 TABLET ORAL DAILY
Qty: 30 TABLET | Refills: 11
Start: 2018-02-21 | End: 2018-10-18

## 2018-02-21 RX ORDER — ESCITALOPRAM OXALATE 10 MG/1
10 TABLET ORAL DAILY
Qty: 30 TABLET | Refills: 11
Start: 2018-02-21 | End: 2018-10-18 | Stop reason: SDUPTHER

## 2018-02-21 RX ORDER — CARVEDILOL 3.12 MG/1
3.12 TABLET ORAL 2 TIMES DAILY WITH MEALS
Qty: 60 TABLET | Refills: 11 | Status: ON HOLD
Start: 2018-02-21 | End: 2021-04-20

## 2018-02-21 RX ORDER — FUROSEMIDE 40 MG/1
40 TABLET ORAL DAILY
Qty: 30 TABLET | Refills: 11
Start: 2018-02-21 | End: 2018-10-18 | Stop reason: SDUPTHER

## 2018-02-21 NOTE — TELEPHONE ENCOUNTER
----- Message from Lizzette Salmeron sent at 2/21/2018  8:51 AM CST -----  Patient son came by office   States doctor was to be weaning patient off some medicines but instead took her off ALL medicines     Patient is at [facility]    Reach popyp Dunbar    [phone]

## 2018-02-21 NOTE — PROGRESS NOTES
Subjective:       Patient ID: Gloria Zuleta is a 88 y.o. female.    Chief Complaint: Hypertension    HPI The patients family returns because her medications were inappropriately discontinued during her office visit yesterday. I reconciled her medication list off of her nursing home MAR but did not notice that wrong patients MAR was sent. This was corrected today after reviewing her correct MAR, consulting with her daughter-in-law who is knowledgeable about her medications and her medication list from the cardiologist.     Review of Systems   Constitutional: Negative.        Objective:      There were no vitals filed for this visit.  Physical Exam  No physical exam   Assessment:       1. CAD in native artery    2. Dementia associated with other underlying disease with behavioral disturbance    3. Essential hypertension    4. NICM (nonischemic cardiomyopathy)    5. Pure hypercholesterolemia        Plan:       CAD in native artery    Dementia associated with other underlying disease with behavioral disturbance    Essential hypertension    NICM (nonischemic cardiomyopathy)    Pure hypercholesterolemia    Other orders  -     spironolactone (ALDACTONE) 25 MG tablet; Take 1 tablet (25 mg total) by mouth once daily.  Dispense: 30 tablet; Refill: 11  -     tamsulosin (FLOMAX) 0.4 mg Cp24; Take 1 capsule (0.4 mg total) by mouth once daily.  Dispense: 30 capsule; Refill: 11  -     tolterodine (DETROL LA) 4 MG 24 hr capsule; Take 1 capsule (4 mg total) by mouth once daily.  Dispense: 30 capsule; Refill: 11  -     escitalopram oxalate (LEXAPRO) 10 MG tablet; Take 1 tablet (10 mg total) by mouth once daily.  Dispense: 30 tablet; Refill: 11  -     furosemide (LASIX) 40 MG tablet; Take 1 tablet (40 mg total) by mouth once daily.  Dispense: 30 tablet; Refill: 11  -     carvedilol (COREG) 3.125 MG tablet; Take 1 tablet (3.125 mg total) by mouth 2 (two) times daily with meals.  Dispense: 60 tablet; Refill: 11  -     lactulose  (CHRONULAC) 20 gram/30 mL Soln; Take 15 mLs (10 g total) by mouth 3 (three) times daily.  Dispense: 450 mL; Refill: 11  -     protein supplement Liqd; Take 30 mLs by mouth 2 (two) times daily.  Dispense: 1 Bottle; Refill: 0      Follow-up if symptoms worsen or fail to improve.

## 2018-06-04 DIAGNOSIS — E04.1 THYROID NODULE: Primary | ICD-10-CM

## 2018-06-25 PROBLEM — I24.9 ACUTE CORONARY SYNDROME: Status: ACTIVE | Noted: 2018-06-25

## 2018-06-26 PROBLEM — N63.15 BREAST LUMP ON RIGHT SIDE AT 12 O'CLOCK POSITION: Status: ACTIVE | Noted: 2018-06-26

## 2018-06-26 PROBLEM — R07.9 CHEST PAIN: Status: ACTIVE | Noted: 2018-06-25

## 2018-06-26 PROBLEM — L89.154 DECUBITUS ULCER OF SACRAL REGION, STAGE 4: Status: ACTIVE | Noted: 2017-03-14

## 2018-06-26 PROBLEM — M86.68 CHRONIC OSTEOMYELITIS OF SACRUM: Status: ACTIVE | Noted: 2018-06-26

## 2018-06-27 PROBLEM — L89.154 SACRAL DECUBITUS ULCER, STAGE IV: Status: ACTIVE | Noted: 2018-06-27

## 2018-06-27 PROBLEM — I24.9 ACUTE CORONARY SYNDROME: Status: ACTIVE | Noted: 2018-06-27

## 2018-10-18 ENCOUNTER — OFFICE VISIT (OUTPATIENT)
Dept: FAMILY MEDICINE | Facility: CLINIC | Age: 83
End: 2018-10-18
Payer: MEDICARE

## 2018-10-18 VITALS
OXYGEN SATURATION: 96 % | DIASTOLIC BLOOD PRESSURE: 60 MMHG | SYSTOLIC BLOOD PRESSURE: 110 MMHG | RESPIRATION RATE: 16 BRPM | HEART RATE: 82 BPM | BODY MASS INDEX: 20.36 KG/M2 | HEIGHT: 59 IN | TEMPERATURE: 98 F | WEIGHT: 101 LBS

## 2018-10-18 DIAGNOSIS — R59.1 LYMPHADENOPATHY OF HEAD AND NECK: ICD-10-CM

## 2018-10-18 DIAGNOSIS — I50.9 CHRONIC CONGESTIVE HEART FAILURE, UNSPECIFIED HEART FAILURE TYPE: ICD-10-CM

## 2018-10-18 DIAGNOSIS — E78.00 PURE HYPERCHOLESTEROLEMIA: ICD-10-CM

## 2018-10-18 DIAGNOSIS — I87.2 VENOUS STASIS DERMATITIS OF BOTH LOWER EXTREMITIES: ICD-10-CM

## 2018-10-18 DIAGNOSIS — I10 ESSENTIAL HYPERTENSION: Primary | ICD-10-CM

## 2018-10-18 DIAGNOSIS — I25.10 CAD IN NATIVE ARTERY: ICD-10-CM

## 2018-10-18 DIAGNOSIS — D69.6 THROMBOCYTOPENIA: ICD-10-CM

## 2018-10-18 DIAGNOSIS — R32 URINARY INCONTINENCE, UNSPECIFIED TYPE: ICD-10-CM

## 2018-10-18 DIAGNOSIS — Z96.651 HX OF TOTAL KNEE REPLACEMENT, RIGHT: ICD-10-CM

## 2018-10-18 DIAGNOSIS — Z86.73 HISTORY OF CVA IN ADULTHOOD: ICD-10-CM

## 2018-10-18 DIAGNOSIS — F02.818 DEMENTIA ASSOCIATED WITH OTHER UNDERLYING DISEASE WITH BEHAVIORAL DISTURBANCE: ICD-10-CM

## 2018-10-18 DIAGNOSIS — F32.89 OTHER DEPRESSION: ICD-10-CM

## 2018-10-18 DIAGNOSIS — C50.911 MALIGNANT NEOPLASM OF RIGHT FEMALE BREAST, UNSPECIFIED ESTROGEN RECEPTOR STATUS, UNSPECIFIED SITE OF BREAST: ICD-10-CM

## 2018-10-18 DIAGNOSIS — L89.154 DECUBITUS ULCER OF SACRAL REGION, STAGE 4: ICD-10-CM

## 2018-10-18 DIAGNOSIS — Z85.72 HISTORY OF LYMPHOMA: ICD-10-CM

## 2018-10-18 PROBLEM — F32.A DEPRESSION: Status: ACTIVE | Noted: 2018-10-18

## 2018-10-18 PROCEDURE — 99999 PR PBB SHADOW E&M-EST. PATIENT-LVL IV: CPT | Mod: PBBFAC,,, | Performed by: INTERNAL MEDICINE

## 2018-10-18 PROCEDURE — 3288F FALL RISK ASSESSMENT DOCD: CPT | Mod: CPTII,S$PBB,, | Performed by: INTERNAL MEDICINE

## 2018-10-18 PROCEDURE — 99214 OFFICE O/P EST MOD 30 MIN: CPT | Mod: PBBFAC,PN | Performed by: INTERNAL MEDICINE

## 2018-10-18 PROCEDURE — 99215 OFFICE O/P EST HI 40 MIN: CPT | Mod: S$PBB,,, | Performed by: INTERNAL MEDICINE

## 2018-10-18 PROCEDURE — 1100F PTFALLS ASSESS-DOCD GE2>/YR: CPT | Mod: CPTII,S$PBB,, | Performed by: INTERNAL MEDICINE

## 2018-10-18 RX ORDER — ATORVASTATIN CALCIUM 40 MG/1
40 TABLET, FILM COATED ORAL DAILY
Qty: 30 TABLET | Refills: 5 | Status: ON HOLD | OUTPATIENT
Start: 2018-10-18 | End: 2021-04-20 | Stop reason: SDUPTHER

## 2018-10-18 RX ORDER — FUROSEMIDE 40 MG/1
40 TABLET ORAL DAILY
Qty: 30 TABLET | Refills: 5 | Status: ON HOLD | OUTPATIENT
Start: 2018-10-18 | End: 2020-09-11 | Stop reason: HOSPADM

## 2018-10-18 RX ORDER — ESCITALOPRAM OXALATE 10 MG/1
10 TABLET ORAL DAILY
Qty: 30 TABLET | Refills: 5 | Status: ON HOLD | OUTPATIENT
Start: 2018-10-18 | End: 2020-09-11 | Stop reason: HOSPADM

## 2018-10-18 RX ORDER — LISINOPRIL 5 MG/1
5 TABLET ORAL DAILY
Qty: 90 TABLET | Refills: 1 | Status: ON HOLD | OUTPATIENT
Start: 2018-10-18 | End: 2020-09-11 | Stop reason: HOSPADM

## 2018-10-18 RX ORDER — TAMSULOSIN HYDROCHLORIDE 0.4 MG/1
0.4 CAPSULE ORAL DAILY
Qty: 30 CAPSULE | Refills: 5 | Status: ON HOLD | OUTPATIENT
Start: 2018-10-18 | End: 2021-04-20

## 2018-10-18 RX ORDER — TOLTERODINE 4 MG/1
4 CAPSULE, EXTENDED RELEASE ORAL DAILY
Qty: 30 CAPSULE | Refills: 5 | Status: ON HOLD | OUTPATIENT
Start: 2018-10-18 | End: 2020-09-11 | Stop reason: HOSPADM

## 2018-10-18 NOTE — PATIENT INSTRUCTIONS
We have reviewed your prescription medications.  I have reordered medications. You stated that she was taken off the spiriva, so I did not refill it. I have ordered labs to be drawn at the home. She got her flu shot yesterday.

## 2018-10-19 NOTE — PROGRESS NOTES
Subjective:       Patient ID: Gloria Zuleta is a 89 y.o. female.    Chief Complaint: Establish Care and Medication Refill     I have reviewed the PMH,  and  for this patient. This is a new patient to me. This patient is a former patient of Dr. William who is here to establish care. She has a PMH significant for advanced dementia, current metastatic breast cancer, hypertension, hyperlipidemia, bladder stenosis, CAD and CHF with low EF, and depression. She is brought her by her son who provides majority of the history. She has also had a knee replacement. She lives in the nursing home nearby -- [facility]. She already got her flu shot. She is not having any chest pains currently. Her last labs in June showed thrombocytopenia with platelet count of 134. Her other labs were Ok. She also has a history of a chronic sacral decubitus wound. She has seen wound care and plastics, and they have not been able to heal it. She is content to leave it alone at this time. It is not draining or causing pain. She uses cushions , but she sits most of the time. She has a knot on her right neck. Non-tender.       Hypertension   This is a chronic problem. The current episode started more than 1 year ago. The problem is unchanged. The problem is controlled. Associated symptoms include peripheral edema. Pertinent negatives include no anxiety, blurred vision, chest pain, headaches, malaise/fatigue, neck pain, orthopnea, palpitations, PND, shortness of breath or sweats. There are no associated agents to hypertension. Risk factors for coronary artery disease include post-menopausal state and sedentary lifestyle. Past treatments include ACE inhibitors, beta blockers, central alpha agonists and diuretics. The current treatment provides significant improvement. There are no compliance problems.  Hypertensive end-organ damage includes CAD/MI and heart failure.     Review of Systems   Constitutional: Negative for chills, fatigue,  fever and malaise/fatigue.   HENT: Negative for congestion, ear discharge, ear pain, rhinorrhea and sore throat.    Eyes: Negative for blurred vision, discharge, redness and itching.   Respiratory: Negative for cough, chest tightness, shortness of breath and wheezing.    Cardiovascular: Positive for leg swelling. Negative for chest pain, palpitations, orthopnea and PND.   Gastrointestinal: Negative for abdominal pain, constipation, diarrhea, nausea and vomiting.   Endocrine: Negative for cold intolerance and heat intolerance.   Genitourinary: Negative for dysuria, flank pain, frequency and hematuria.   Musculoskeletal: Negative for arthralgias, back pain, joint swelling, myalgias and neck pain.   Skin: Negative for color change and rash.   Neurological: Negative for dizziness, tremors, numbness and headaches.   Psychiatric/Behavioral: Negative for dysphoric mood and sleep disturbance. The patient is not nervous/anxious.        Objective:      Physical Exam   Constitutional: She appears well-developed and well-nourished.   HENT:   Head: Normocephalic and atraumatic.   Right Ear: External ear normal. Tympanic membrane is not erythematous. No middle ear effusion.   Left Ear: External ear normal. Tympanic membrane is not erythematous.  No middle ear effusion.   Mouth/Throat: No posterior oropharyngeal edema or posterior oropharyngeal erythema. No tonsillar exudate.   Eyes: Conjunctivae and EOM are normal. Pupils are equal, round, and reactive to light.   Neck: Full passive range of motion without pain. Neck supple. No thyroid mass and no thyromegaly present.       Cardiovascular: Normal rate, regular rhythm, normal heart sounds and intact distal pulses.   No murmur heard.  Pulmonary/Chest: Effort normal and breath sounds normal. She has no wheezes.   Abdominal: She exhibits no distension. There is no tenderness.   Musculoskeletal: She exhibits edema (bilateral). She exhibits no tenderness.   Lymphadenopathy:     She has  cervical adenopathy.   Neurological: She displays normal reflexes. No cranial nerve deficit.   Skin: Skin is warm and dry. No rash noted.   Psychiatric: She has a normal mood and affect. Her behavior is normal.       Assessment and Plan:     Problem List Items Addressed This Visit        Neuro    Dementia associated with other underlying disease with behavioral disturbance - chronic . Stable. In nursing home. Son helps take care of her.       History of CVA in adulthood - stable. Control bp.        Psychiatric    Depression - stable. She is doing well on lexapro       Derm    Decubitus ulcer of sacral region, stage 4 - chronic. She has seen wound care. She sits all day and does not have enough padding to prevent it. High protein diet recommended.        Cardiac/Vascular    CAD in native artery - stable. She sees Dr. Jaime.       Essential hypertension - Primary - bp looks great. Stable.       Hyperlipemia - we will check labs. On cholesterol meds.     Relevant Orders    Comprehensive metabolic panel    Lipid panel    Venous stasis dermatitis of both lower extremities - chronic. Stable. She does not like compression stockings.       Chronic congestive heart failure - chronic. EF of 15% last time. Sees Dr. Jaime.        Renal/    Urinary incontinence - on detrol. Try to keep dry to heal sacral wound.        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 Stable.       Malignant neoplasm of right female breast - currently on hormonal suppression. No other treatments pursued at this time. Swollen gland in right neck may be metastatic.        Orthopedic    Hx of total knee replacement, right - stable.        Other    Lymphadenopathy of head and neck - may be due to metastatic breast cancer. Son does not want any further testing at this time.       Other Visit Diagnoses     Thrombocytopenia    - chronic. We will check labs.     Relevant Orders    CBC auto differential

## 2019-01-02 DIAGNOSIS — Z12.39 ENCOUNTER FOR BREAST CANCER SCREENING OTHER THAN MAMMOGRAM: Primary | ICD-10-CM

## 2019-01-02 DIAGNOSIS — R92.30 DENSE BREAST: ICD-10-CM

## 2019-01-02 DIAGNOSIS — Z12.31 VISIT FOR SCREENING MAMMOGRAM: ICD-10-CM

## 2019-01-02 DIAGNOSIS — N63.0 LUMP OR MASS IN BREAST: ICD-10-CM

## 2019-01-03 DIAGNOSIS — R92.30 DENSE BREAST: Primary | ICD-10-CM

## 2019-01-26 ENCOUNTER — HOSPITAL ENCOUNTER (OUTPATIENT)
Facility: HOSPITAL | Age: 84
Discharge: HOME | End: 2019-01-27
Attending: EMERGENCY MEDICINE | Admitting: INTERNAL MEDICINE
Payer: MEDICARE

## 2019-01-26 DIAGNOSIS — N63.15 BREAST LUMP ON RIGHT SIDE AT 12 O'CLOCK POSITION: ICD-10-CM

## 2019-01-26 DIAGNOSIS — M86.68 CHRONIC OSTEOMYELITIS OF SACRUM: ICD-10-CM

## 2019-01-26 DIAGNOSIS — R79.89 TROPONIN I ABOVE REFERENCE RANGE: Primary | ICD-10-CM

## 2019-01-26 DIAGNOSIS — E78.2 MIXED HYPERLIPIDEMIA: ICD-10-CM

## 2019-01-26 DIAGNOSIS — R79.89 ELEVATED TROPONIN: ICD-10-CM

## 2019-01-26 DIAGNOSIS — R32 URINARY INCONTINENCE, UNSPECIFIED TYPE: ICD-10-CM

## 2019-01-26 DIAGNOSIS — I25.10 CAD IN NATIVE ARTERY: ICD-10-CM

## 2019-01-26 DIAGNOSIS — R07.2 PRECORDIAL PAIN: ICD-10-CM

## 2019-01-26 DIAGNOSIS — R07.9 CHEST PAIN: ICD-10-CM

## 2019-01-26 DIAGNOSIS — Z86.73 HISTORY OF CVA IN ADULTHOOD: ICD-10-CM

## 2019-01-26 DIAGNOSIS — I50.42 CHRONIC COMBINED SYSTOLIC AND DIASTOLIC HEART FAILURE: ICD-10-CM

## 2019-01-26 DIAGNOSIS — I10 ESSENTIAL HYPERTENSION: ICD-10-CM

## 2019-01-26 DIAGNOSIS — F02.818 DEMENTIA ASSOCIATED WITH OTHER UNDERLYING DISEASE WITH BEHAVIORAL DISTURBANCE: ICD-10-CM

## 2019-01-26 LAB
ALBUMIN SERPL BCP-MCNC: 3.9 G/DL
ALP SERPL-CCNC: 114 U/L
ALT SERPL W/O P-5'-P-CCNC: 9 U/L
ANION GAP SERPL CALC-SCNC: 8 MMOL/L
AST SERPL-CCNC: 18 U/L
BASOPHILS # BLD AUTO: 0.01 K/UL
BASOPHILS NFR BLD: 0.1 %
BILIRUB SERPL-MCNC: 0.6 MG/DL
BNP SERPL-MCNC: 168 PG/ML
BUN SERPL-MCNC: 19 MG/DL
CALCIUM SERPL-MCNC: 10.6 MG/DL
CHLORIDE SERPL-SCNC: 103 MMOL/L
CO2 SERPL-SCNC: 30 MMOL/L
CREAT SERPL-MCNC: 0.8 MG/DL
DIFFERENTIAL METHOD: ABNORMAL
EOSINOPHIL # BLD AUTO: 0.1 K/UL
EOSINOPHIL NFR BLD: 1.6 %
ERYTHROCYTE [DISTWIDTH] IN BLOOD BY AUTOMATED COUNT: 14.6 %
EST. GFR  (AFRICAN AMERICAN): >60 ML/MIN/1.73 M^2
EST. GFR  (NON AFRICAN AMERICAN): >60 ML/MIN/1.73 M^2
GLUCOSE SERPL-MCNC: 96 MG/DL
HCT VFR BLD AUTO: 37.9 %
HGB BLD-MCNC: 12.4 G/DL
LYMPHOCYTES # BLD AUTO: 1.7 K/UL
LYMPHOCYTES NFR BLD: 23.1 %
MCH RBC QN AUTO: 30.5 PG
MCHC RBC AUTO-ENTMCNC: 32.7 G/DL
MCV RBC AUTO: 93 FL
MONOCYTES # BLD AUTO: 0.5 K/UL
MONOCYTES NFR BLD: 6.8 %
NEUTROPHILS # BLD AUTO: 5.1 K/UL
NEUTROPHILS NFR BLD: 68.4 %
PLATELET # BLD AUTO: 147 K/UL
PMV BLD AUTO: 9.5 FL
POTASSIUM SERPL-SCNC: 4.5 MMOL/L
PROT SERPL-MCNC: 6.9 G/DL
RBC # BLD AUTO: 4.07 M/UL
SODIUM SERPL-SCNC: 141 MMOL/L
TROPONIN I SERPL DL<=0.01 NG/ML-MCNC: 0.01 NG/ML
WBC # BLD AUTO: 7.49 K/UL

## 2019-01-26 PROCEDURE — 84484 ASSAY OF TROPONIN QUANT: CPT

## 2019-01-26 PROCEDURE — 96361 HYDRATE IV INFUSION ADD-ON: CPT

## 2019-01-26 PROCEDURE — 85025 COMPLETE CBC W/AUTO DIFF WBC: CPT

## 2019-01-26 PROCEDURE — 25000003 PHARM REV CODE 250: Performed by: EMERGENCY MEDICINE

## 2019-01-26 PROCEDURE — 83880 ASSAY OF NATRIURETIC PEPTIDE: CPT

## 2019-01-26 PROCEDURE — 99285 EMERGENCY DEPT VISIT HI MDM: CPT | Mod: 25

## 2019-01-26 PROCEDURE — 96360 HYDRATION IV INFUSION INIT: CPT

## 2019-01-26 PROCEDURE — 80053 COMPREHEN METABOLIC PANEL: CPT

## 2019-01-26 PROCEDURE — 93005 ELECTROCARDIOGRAM TRACING: CPT

## 2019-01-26 RX ORDER — ASPIRIN 325 MG
325 TABLET ORAL
Status: DISCONTINUED | OUTPATIENT
Start: 2019-01-26 | End: 2019-01-27

## 2019-01-26 RX ADMIN — SODIUM CHLORIDE 500 ML: 0.9 INJECTION, SOLUTION INTRAVENOUS at 09:01

## 2019-01-27 VITALS
RESPIRATION RATE: 20 BRPM | SYSTOLIC BLOOD PRESSURE: 156 MMHG | DIASTOLIC BLOOD PRESSURE: 90 MMHG | HEIGHT: 58 IN | TEMPERATURE: 98 F | WEIGHT: 107.5 LBS | BODY MASS INDEX: 22.56 KG/M2 | OXYGEN SATURATION: 94 % | HEART RATE: 82 BPM

## 2019-01-27 PROBLEM — R79.89 TROPONIN I ABOVE REFERENCE RANGE: Status: ACTIVE | Noted: 2019-01-27

## 2019-01-27 LAB
CHOLEST SERPL-MCNC: 128 MG/DL
CHOLEST/HDLC SERPL: 3 {RATIO}
ESTIMATED AVG GLUCOSE: 97 MG/DL
HBA1C MFR BLD HPLC: 5 %
HDLC SERPL-MCNC: 43 MG/DL
HDLC SERPL: 33.6 %
LDLC SERPL CALC-MCNC: 69.8 MG/DL
NONHDLC SERPL-MCNC: 85 MG/DL
TRIGL SERPL-MCNC: 76 MG/DL
TROPONIN I SERPL DL<=0.01 NG/ML-MCNC: 0.01 NG/ML
TROPONIN I SERPL DL<=0.01 NG/ML-MCNC: 0.01 NG/ML
TROPONIN I SERPL DL<=0.01 NG/ML-MCNC: 0.03 NG/ML
TSH SERPL DL<=0.005 MIU/L-ACNC: 0.75 UIU/ML

## 2019-01-27 PROCEDURE — 84443 ASSAY THYROID STIM HORMONE: CPT

## 2019-01-27 PROCEDURE — 93005 ELECTROCARDIOGRAM TRACING: CPT

## 2019-01-27 PROCEDURE — 84484 ASSAY OF TROPONIN QUANT: CPT | Mod: 91

## 2019-01-27 PROCEDURE — A4216 STERILE WATER/SALINE, 10 ML: HCPCS | Performed by: STUDENT IN AN ORGANIZED HEALTH CARE EDUCATION/TRAINING PROGRAM

## 2019-01-27 PROCEDURE — 36415 COLL VENOUS BLD VENIPUNCTURE: CPT

## 2019-01-27 PROCEDURE — 83036 HEMOGLOBIN GLYCOSYLATED A1C: CPT

## 2019-01-27 PROCEDURE — G0378 HOSPITAL OBSERVATION PER HR: HCPCS

## 2019-01-27 PROCEDURE — 96374 THER/PROPH/DIAG INJ IV PUSH: CPT

## 2019-01-27 PROCEDURE — 63600175 PHARM REV CODE 636 W HCPCS: Performed by: STUDENT IN AN ORGANIZED HEALTH CARE EDUCATION/TRAINING PROGRAM

## 2019-01-27 PROCEDURE — 94761 N-INVAS EAR/PLS OXIMETRY MLT: CPT

## 2019-01-27 PROCEDURE — 96372 THER/PROPH/DIAG INJ SC/IM: CPT

## 2019-01-27 PROCEDURE — 80061 LIPID PANEL: CPT

## 2019-01-27 PROCEDURE — 25000003 PHARM REV CODE 250: Performed by: STUDENT IN AN ORGANIZED HEALTH CARE EDUCATION/TRAINING PROGRAM

## 2019-01-27 RX ORDER — TAMSULOSIN HYDROCHLORIDE 0.4 MG/1
0.4 CAPSULE ORAL DAILY
Status: DISCONTINUED | OUTPATIENT
Start: 2019-01-27 | End: 2019-01-27 | Stop reason: HOSPADM

## 2019-01-27 RX ORDER — FUROSEMIDE 10 MG/ML
40 INJECTION INTRAMUSCULAR; INTRAVENOUS ONCE
Status: COMPLETED | OUTPATIENT
Start: 2019-01-27 | End: 2019-01-27

## 2019-01-27 RX ORDER — ESCITALOPRAM OXALATE 10 MG/1
10 TABLET ORAL DAILY
Status: DISCONTINUED | OUTPATIENT
Start: 2019-01-27 | End: 2019-01-27 | Stop reason: HOSPADM

## 2019-01-27 RX ORDER — ATORVASTATIN CALCIUM 40 MG/1
40 TABLET, FILM COATED ORAL DAILY
Status: DISCONTINUED | OUTPATIENT
Start: 2019-01-27 | End: 2019-01-27 | Stop reason: HOSPADM

## 2019-01-27 RX ORDER — NAPROXEN SODIUM 220 MG/1
81 TABLET, FILM COATED ORAL DAILY
Status: DISCONTINUED | OUTPATIENT
Start: 2019-01-27 | End: 2019-01-27 | Stop reason: HOSPADM

## 2019-01-27 RX ORDER — SODIUM CHLORIDE 0.9 % (FLUSH) 0.9 %
3 SYRINGE (ML) INJECTION EVERY 8 HOURS
Status: DISCONTINUED | OUTPATIENT
Start: 2019-01-27 | End: 2019-01-27 | Stop reason: HOSPADM

## 2019-01-27 RX ORDER — CARVEDILOL 3.12 MG/1
3.12 TABLET ORAL 2 TIMES DAILY WITH MEALS
Status: DISCONTINUED | OUTPATIENT
Start: 2019-01-27 | End: 2019-01-27 | Stop reason: HOSPADM

## 2019-01-27 RX ORDER — OXYBUTYNIN CHLORIDE 5 MG/1
10 TABLET, EXTENDED RELEASE ORAL DAILY
Status: DISCONTINUED | OUTPATIENT
Start: 2019-01-27 | End: 2019-01-27 | Stop reason: HOSPADM

## 2019-01-27 RX ORDER — LISINOPRIL 5 MG/1
5 TABLET ORAL DAILY
Status: DISCONTINUED | OUTPATIENT
Start: 2019-01-27 | End: 2019-01-27 | Stop reason: HOSPADM

## 2019-01-27 RX ORDER — HEPARIN SODIUM 5000 [USP'U]/ML
5000 INJECTION, SOLUTION INTRAVENOUS; SUBCUTANEOUS EVERY 12 HOURS
Status: DISCONTINUED | OUTPATIENT
Start: 2019-01-27 | End: 2019-01-27 | Stop reason: HOSPADM

## 2019-01-27 RX ORDER — ASCORBIC ACID 500 MG
500 TABLET ORAL DAILY
Status: DISCONTINUED | OUTPATIENT
Start: 2019-01-27 | End: 2019-01-27 | Stop reason: HOSPADM

## 2019-01-27 RX ADMIN — FUROSEMIDE 40 MG: 10 INJECTION, SOLUTION INTRAMUSCULAR; INTRAVENOUS at 05:01

## 2019-01-27 RX ADMIN — SODIUM CHLORIDE, PRESERVATIVE FREE 3 ML: 5 INJECTION INTRAVENOUS at 06:01

## 2019-01-27 RX ADMIN — CARVEDILOL 3.12 MG: 3.12 TABLET, FILM COATED ORAL at 07:01

## 2019-01-27 RX ADMIN — ATORVASTATIN CALCIUM 40 MG: 40 TABLET, FILM COATED ORAL at 08:01

## 2019-01-27 RX ADMIN — LISINOPRIL 5 MG: 5 TABLET ORAL at 08:01

## 2019-01-27 RX ADMIN — OXYBUTYNIN CHLORIDE 10 MG: 5 TABLET, EXTENDED RELEASE ORAL at 08:01

## 2019-01-27 RX ADMIN — OXYCODONE HYDROCHLORIDE AND ACETAMINOPHEN 500 MG: 500 TABLET ORAL at 08:01

## 2019-01-27 RX ADMIN — ASPIRIN 81 MG 81 MG: 81 TABLET ORAL at 08:01

## 2019-01-27 RX ADMIN — HEPARIN SODIUM 5000 UNITS: 5000 INJECTION, SOLUTION INTRAVENOUS; SUBCUTANEOUS at 08:01

## 2019-01-27 RX ADMIN — ESCITALOPRAM OXALATE 10 MG: 10 TABLET, FILM COATED ORAL at 08:01

## 2019-01-27 RX ADMIN — TAMSULOSIN HYDROCHLORIDE 0.4 MG: 0.4 CAPSULE ORAL at 08:01

## 2019-01-27 NOTE — ED TRIAGE NOTES
Patient presents to ER via EMS with complaints of chest pain that started around 1915 and radiates to left arm. Patient states her chest feels heavy, denies nausea/vomiting. EMS administered 1 round of nitro and 325mg aspirin prior to arrival. Denies SOB. Cardiac monitoring in place, EKG obtained and MD notified.

## 2019-01-27 NOTE — DISCHARGE SUMMARY
[facility] Hospital Medicine Discharge Summary    Primary Team: [facility] Hospitalist Team A  Attending Physician: Yara Robb MD  Resident: Raymond  Intern: Gray    Date of Admit: 1/26/2019  Date of Discharge: 1/27/2019    Discharge to: [facility]  Condition: stable    Discharge Diagnoses     Patient Active Problem List   Diagnosis    CAD in native artery    NICM (nonischemic cardiomyopathy)    Essential hypertension    Hyperlipemia    Dementia associated with other underlying disease with behavioral disturbance    Elevated troponin    Urinary incontinence    Rectal prolapse    Decubitus ulcer of sacral region, stage 4    History of CVA in adulthood    Chest pain    Breast lump on right side at 12 o'clock position    Chronic osteomyelitis of sacrum    Sacral decubitus ulcer, stage IV    Acute coronary syndrome    History of lymphoma    Malignant neoplasm of right female breast    Hx of total knee replacement, right    Venous stasis dermatitis of both lower extremities    Depression    Lymphadenopathy of head and neck    Chronic congestive heart failure    Troponin I above reference range    Chronic combined systolic and diastolic heart failure       Consultants and Procedures     Consultants:  none    Procedures:   none    Imaging:  CXR: cardiomegaly, chronic L shoulder deformity, minimal pulmonary edema    Brief History of Present Illness      In summary Gloria Zuleta is a 89 y.o. female with CAD, HFrEF, HLD, Dementia. The patient presented to [facility] on 1/26/2019 with a primary complaint of chest pain.  Ms. Zuleta is a resident of [facility] and while attempting to fall asleep on the night of presentation developed mid sternal pressure like chest pain that did not radiate.  The pain resolved on its own after 5-10 minutes.  She had no associated SOB, N/V, diaphoresis.  Of note the patient experienced what she says is the exact same  pain last week and was observed at [facility] for MI rule out.  Echo at that time showed unchanged EF of 25%.  She is complaint with daily ASA81, statin, BB, ACEi, lasix.  She reports constant leg swelling and says her edema is at baseline for her, no difficulty breathing as of late.  In the ED she got 325mg of ASA and EKG appeared unchanged from prior.    For the full HPI please refer to the History & Physical from this admission.    Hospital Course By Problem with Pertinent Findings     Elevated Troponin  - atypical chest pain while lying down to sleep, self-resolved in minutes, recent admission last week with same presentation, 3 negative troponins and unchanged echo last week  - 1st trop negative, 2nd trop 0.027, small elevation likely just due to her degree of heart failure, 3rd negative  - EKG with no acute ischemic changes, unchanged incomplete BBB  - received  in ED  - monitored on tele  - continued home asa, statin, coreg, lisinopril  - A1c 5.0, lipid panel and TSH WNL     CAD of Native Vessel  - unknown cardiac history but reports no stents in the past  - asa, statin     Nonischemic Heart Failure with reduced EF  - recent echo with EF 25%  - small troponin elevation could be caused by this alone  - , LE edema, clear lungs, CXR with minimal pulmonary edema  - got lasix 40mg IV for 1 dose (home dose 40 po daily)  - continued asa, statin, BB, ACEi     Essential Hyppertension  - continued coreg, lisinopril     Hyperlipidemia  - continued lipitor 20     Dementia  - continued effexor 10mg daily  - stable and at baseline     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  - No signs of acute infection     H/o CVA  - no residual deficits  - continued asa, lipitor     Mixed Urinary Incontinence  - continued home flomax and oxybutynin  - monitor closely while on  diuretics     Right Breast Mass  - Chronic >1 year, told by her Physician that this needs no acute intervention, family wants no aggressive management anyway  - held off on further interventions      Discharge Medications      Gloria Zuleta   Home Medication Instructions [id number]    Printed on:01/27/19 1059   Medication Information                      ascorbic acid, vitamin C, (VITAMIN C) 500 MG tablet  Take 500 mg by mouth once daily.             aspirin 81 MG Chew  Take 1 tablet (81 mg total) by mouth once daily.             atorvastatin (LIPITOR) 40 MG tablet  Take 1 tablet (40 mg total) by mouth once daily.             carvedilol (COREG) 3.125 MG tablet  Take 1 tablet (3.125 mg total) by mouth 2 (two) times daily with meals.             collagenase ointment  Apply topically once daily. To sacral wound             escitalopram oxalate (LEXAPRO) 10 MG tablet  Take 1 tablet (10 mg total) by mouth once daily.             furosemide (LASIX) 40 MG tablet  Take 1 tablet (40 mg total) by mouth once daily.             lisinopril (PRINIVIL,ZESTRIL) 5 MG tablet  Take 1 tablet (5 mg total) by mouth once daily.             MULTIVIT-IRON-MIN-FOLIC ACID 3,500-18-0.4 UNIT-MG-MG ORAL CHEW  Take by mouth.             tamsulosin (FLOMAX) 0.4 mg Cap  Take 1 capsule (0.4 mg total) by mouth once daily.             tolterodine (DETROL LA) 4 MG 24 hr capsule  Take 1 capsule (4 mg total) by mouth once daily.                 Discharge Information:   Diet:  cardiac    Physical Activity:  As tolerated             Instructions:  1. Take all medications as prescribed  2. Keep all follow-up appointments  3. Return to the hospital or call your primary care physicians if any worsening symptoms such as fever, chest pain, shortness of breath, return of symptoms, or any other concerns.    Follow-Up Appointments:  1. PCP at Nursing home  2. Cardiology - Dr. Yeni Garcia MD  [facility] Internal Medicine, [facility]

## 2019-01-27 NOTE — NURSING
Pt discharged back to [facility]. Report called to LAMINE Waters. IV removed and monitor removed and returned back to monitor room. Awaiting transportation.

## 2019-01-27 NOTE — ED NOTES
Notified patient's son Manjinder that patient was in the ER being evaluated per patient's request.

## 2019-01-27 NOTE — PLAN OF CARE
Problem: Adult Inpatient Plan of Care  Goal: Plan of Care Review  Outcome: Ongoing (interventions implemented as appropriate)   01/27/19 0600   Plan of Care Review   Plan of Care Reviewed With patient   Pt came from ER due to elevated troponin.  POC reviewed with the pt, verbalized understanding.  No complaint of pain or any other distress noted since pt is on tele unit.   On continuous telemetry monitor, NSR.  No ectopy noted.  Fall precaution explained, contract signed.  Safety maintained, free of falls throughout shift.  Instructed to call for any assistance.  Will continue to monitor.

## 2019-01-27 NOTE — PROGRESS NOTES
[place] faxed Facility Transfer Orders to [facility], [phone], fax [phone]. Awaiting return call from charge nurse once she has reviewed the orders

## 2019-01-27 NOTE — PLAN OF CARE
[facility]    FACILITY TRANSFER ORDERS      Patient Name: Gloria Zuleta  YOB: 1929    PCP: Geoffrey Harrison MD   PCP Address: [address] [place] [address] / [place] [address]  PCP Phone Number: [phone]  PCP Fax: [phone]    Encounter Date: 01/27/2019    Admit to: [facility]    Vital Signs:  Routine    Diagnoses:   Active Hospital Problems    Diagnosis  POA    *Elevated troponin [R74.8]  Yes    Troponin I above reference range [R74.8]  Yes    Chronic combined systolic and diastolic heart failure [I50.42]  Yes    Sacral decubitus ulcer, stage IV [L89.154]  Yes    Breast lump on right side at 12 o'clock position [N63.10]  Yes    Chronic osteomyelitis of sacrum [M86.68]  Yes    History of CVA in adulthood [Z86.73]  Not Applicable    Decubitus ulcer of sacral region, stage 4 [L89.154]  Yes    Dementia associated with other underlying disease with behavioral disturbance [F02.81]  Yes    Urinary incontinence [R32]  Yes    CAD in native artery [I25.10]  Yes    NICM (nonischemic cardiomyopathy) [I42.8]  Yes    Essential hypertension [I10]  Yes    Hyperlipemia [E78.5]  Yes      Resolved Hospital Problems   No resolved problems to display.       Allergies:  Review of patient's allergies indicates:   Allergen Reactions    Bactrim [sulfamethoxazole-trimethoprim]     Codeine     Cortisone     Keflex [cephalexin]     Pcn [penicillins]     Sulfadiazine     Sumycin 250     Contrast media Other (See Comments)     unknown    Donnatal [phenobarb-hyoscy-atropine-scop] Other (See Comments)     Unknown     Restoril [temazepam] Other (See Comments)     unknown       Diet: cardiac diet    Activities: Activity as tolerated    Medications: Review discharge medications with patient and family and provide education.      Current Discharge Medication List      CONTINUE these medications which have NOT CHANGED    Details   ascorbic acid, vitamin C, (VITAMIN C) 500 MG tablet  Take 500 mg by mouth once daily.      aspirin 81 MG Chew Take 1 tablet (81 mg total) by mouth once daily.  Refills: 0      atorvastatin (LIPITOR) 40 MG tablet Take 1 tablet (40 mg total) by mouth once daily.  Qty: 30 tablet, Refills: 5      carvedilol (COREG) 3.125 MG tablet Take 1 tablet (3.125 mg total) by mouth 2 (two) times daily with meals.  Qty: 60 tablet, Refills: 11      collagenase ointment Apply topically once daily. To sacral wound  Refills: 0      escitalopram oxalate (LEXAPRO) 10 MG tablet Take 1 tablet (10 mg total) by mouth once daily.  Qty: 30 tablet, Refills: 5      furosemide (LASIX) 40 MG tablet Take 1 tablet (40 mg total) by mouth once daily.  Qty: 30 tablet, Refills: 5      lisinopril (PRINIVIL,ZESTRIL) 5 MG tablet Take 1 tablet (5 mg total) by mouth once daily.  Qty: 90 tablet, Refills: 1      MULTIVIT-IRON-MIN-FOLIC ACID 3,500-18-0.4 UNIT-MG-MG ORAL CHEW Take by mouth.      tamsulosin (FLOMAX) 0.4 mg Cap Take 1 capsule (0.4 mg total) by mouth once daily.  Qty: 30 capsule, Refills: 5      tolterodine (DETROL LA) 4 MG 24 hr capsule Take 1 capsule (4 mg total) by mouth once daily.  Qty: 30 capsule, Refills: 5                  _________________________________  Renard Garcia MD  01/27/2019

## 2019-01-27 NOTE — H&P
[facility] Medicine History & Physical    Admitting Team: [facility] Hospitalist Team A  Attending Physician: Dr. Robb  Resident: Dr. Andrade  Intern: Dr. Garcia    Date of Admit: 1/26/2019    Chief Complaint     Chest Pain (Patient reports left side chest pain around 1915 that radiates to left arm. Recieved 1 nitro and 325 mg aspirin via EMS.)   for 5-10 minutes    Subjective:      History of Present Illness:  Gloria Zuleta is a 89 y.o. female with CAD, HFrEF, HLD, Dementia. The patient presented to [facility] on 1/26/2019 with a primary complaint of chest pain.  Ms. Zuleta is a resident of [facility] and while attempting to fall asleep on the night of presentation developed mid sternal pressure like chest pain that did not radiate.  The pain resolved on its own after 5-10 minutes.  She had no associated SOB, N/V, diaphoresis.  Of note the patient experienced what she says is the exact same pain last week and was observed at [facility] for MI rule out.  Echo at that time showed unchanged EF of 25%.  She is complaint with daily ASA81, statin, BB, ACEi, lasix.  She reports constant leg swelling and says her edema is at baseline for her, no difficulty breathing as of late.  In the ED she got 325mg of ASA and EKG appeared unchanged from prior.    Past Medical History:  Past Medical History:   Diagnosis Date    Breast CA     right    CAD (coronary artery disease)     CHF (congestive heart failure)     Hypertension     Labial abscess     Lymphoma     creceived chemo treatment    Osteoarthritis        Past Surgical History:  Past Surgical History:   Procedure Laterality Date    ABSCESS DRAINAGE      labia    COLONOSCOPY      dialtion      urethral dialtion    ESOPHAGOGASTRODUODENOSCOPY      HEART CATH-LEFT Right 11/22/2016    Performed by Lamberto Jaime MD at [facility] CATH LAB    HYSTERECTOMY      total abdominal    ALEXANDRU    NECK EXPLORATION       exploratory lap & Bx of  RIGHT neck    UPPER GASTROINTESTINAL ENDOSCOPY         Allergies:  Review of patient's allergies indicates:   Allergen Reactions    Bactrim [sulfamethoxazole-trimethoprim]     Codeine     Cortisone     Keflex [cephalexin]     Pcn [penicillins]     Sulfadiazine     Sumycin 250     Contrast media Other (See Comments)     unknown    Donnatal [phenobarb-hyoscy-atropine-scop] Other (See Comments)     Unknown     Restoril [temazepam] Other (See Comments)     unknown       Home Medications:  Prior to Admission medications    Medication Sig Start Date End Date Taking? Authorizing Provider   ascorbic acid, vitamin C, (VITAMIN C) 500 MG tablet Take 500 mg by mouth once daily.    Historical Provider, MD   aspirin 81 MG Chew Take 1 tablet (81 mg total) by mouth once daily. 11/23/16 2/21/18  VISHAL Overton   atorvastatin (LIPITOR) 40 MG tablet Take 1 tablet (40 mg total) by mouth once daily. 10/18/18   Julita Fernandes MD   carvedilol (COREG) 3.125 MG tablet Take 1 tablet (3.125 mg total) by mouth 2 (two) times daily with meals. 2/21/18 2/21/19  Robert William Jr., MD   collagenase ointment Apply topically once daily. To sacral wound 6/27/18   Pallavi Sunkara, MD   escitalopram oxalate (LEXAPRO) 10 MG tablet Take 1 tablet (10 mg total) by mouth once daily. 10/18/18 10/18/19  Julita Fernandes MD   furosemide (LASIX) 40 MG tablet Take 1 tablet (40 mg total) by mouth once daily. 10/18/18 10/18/19  Julita Fernandes MD   lisinopril (PRINIVIL,ZESTRIL) 5 MG tablet Take 1 tablet (5 mg total) by mouth once daily. 10/18/18 10/18/19  Julita Fernandes MD   MULTIVIT-IRON-MIN-FOLIC ACID 3,500-18-0.4 UNIT-MG-MG ORAL CHEW Take by mouth.    Historical Provider, MD   tamsulosin (FLOMAX) 0.4 mg Cap Take 1 capsule (0.4 mg total) by mouth once daily. 10/18/18 10/18/19  Julita Fernandes MD   tolterodine (DETROL LA) 4 MG 24 hr capsule Take 1 capsule (4 mg total) by mouth once daily. 10/18/18 10/18/19   "Julita Fernandes MD       Family History:  Family History   Problem Relation Age of Onset    No Known Problems Mother     No Known Problems Father        Social History:  Social History     Tobacco Use    Smoking status: Never Smoker    Smokeless tobacco: Never Used   Substance Use Topics    Alcohol use: No    Drug use: No       Review of Systems:  12 point review of systems completed and negative unless stated above    Health Maintaince :   Primary Care Physician: Hunter    Immunizations:   Flu: UTS  TDap: UTD    Pna: UTD    Cancer Screening:  PAP: unknown  MMG: unknown  Colonoscopy: unknown     Objective:   Last 24 Hour Vital Signs:  BP  Min: 129/84  Max: 165/77  Temp  Av.6 °F (36.4 °C)  Min: 97.6 °F (36.4 °C)  Max: 97.6 °F (36.4 °C)  Pulse  Av.6  Min: 76  Max: 100  Resp  Av  Min: 14  Max: 20  SpO2  Av.4 %  Min: 93 %  Max: 97 %  Height  Av' 11" (149.9 cm)  Min: 4' 11" (149.9 cm)  Max: 4' 11" (149.9 cm)  Weight  Av.9 kg (110 lb)  Min: 49.9 kg (110 lb)  Max: 49.9 kg (110 lb)  Body mass index is 22.22 kg/m².  No intake/output data recorded.    Physical Examination:  Gen: alert and oriented, comfortable, does not appear in distress  Head: normocephalic, atraumatic  Eyes: PERLLA, EOM intact, sclera and conjunctiva clear, no icterus  Ears: external ear canals clear.    Mouth:  Moist mucus membranes, oropharynx clear and without exudate.    Neck: No lymphadenopathy, trachea midline, thyroid non-tender, not grossly enlarged or nodular, no carotid bruits, JVP 8  Cardiac: regular rate and rhythm, normal S1 and S2, distant heart sounds, grade 3/6 systolic ejection murmur heard best over aortic area, no rubs, no extra heart sounds  Chest: no use of accessory muscles, symmetric chest rise, lung fields clear to auscultation bilaterally, no wheezes, no rales, no rhonci  Abdomen: soft, non-tender, non-distended, normoactive bowel sounds, no organomegaly   Extremities: 2+ pitting edema bilateral " lower extremities up to knees, warm, no cyanosis, no jaundice, no bruising, no edema  Pulses: 2+ dorsalis pedis and radial pulses bilaterally  Neuro: Gen: alert, oriented to person, place, time, non-focal exam    Laboratory:  Most Recent Data:  CBC:   Lab Results   Component Value Date    WBC 7.49 01/26/2019    HGB 12.4 01/26/2019    HCT 37.9 01/26/2019     (L) 01/26/2019    MCV 93 01/26/2019    RDW 14.6 (H) 01/26/2019     BMP:   Lab Results   Component Value Date     01/26/2019    K 4.5 01/26/2019     01/26/2019    CO2 30 (H) 01/26/2019    BUN 19 01/26/2019    CREATININE 0.8 01/26/2019    GLU 96 01/26/2019    CALCIUM 10.6 (H) 01/26/2019    MG 2.7 (H) 12/03/2009     LFTs:   Lab Results   Component Value Date    PROT 6.9 01/26/2019    ALBUMIN 3.9 01/26/2019    BILITOT 0.6 01/26/2019    AST 18 01/26/2019    ALKPHOS 114 01/26/2019    ALT 9 (L) 01/26/2019     Coags:   Lab Results   Component Value Date    INR 1.2 11/21/2016     FLP:   Lab Results   Component Value Date    CHOL 193 12/03/2009    HDL 32 (L) 12/03/2009    LDLCALC 125.0 12/03/2009    TRIG 180 (H) 12/03/2009    CHOLHDL 16.6 (L) 12/03/2009     DM:   Lab Results   Component Value Date    LDLCALC 125.0 12/03/2009    CREATININE 0.8 01/26/2019     Thyroid:   Lab Results   Component Value Date    TSH 0.639 02/23/2017    P8CZGPB 7.3 12/03/2009     Anemia:   Lab Results   Component Value Date    IRON 67 12/04/2009    TIBC 360 12/04/2009    VTURDJJR95 580 12/04/2009    FOLATE >20 12/04/2009     Cardiac:   Lab Results   Component Value Date    TROPONINI 0.027 (H) 01/26/2019     (H) 01/26/2019     Urinalysis:   Lab Results   Component Value Date    LABURIN ESCHERICHIA COLI  10,000 - 49,999 cfu/ml   02/22/2017    COLORU Yellow 03/14/2017    SPECGRAV 1.010 03/14/2017    NITRITE Negative 03/14/2017    KETONESU Negative 03/14/2017    UROBILINOGEN Negative 03/14/2017    WBCUA 3 03/14/2017       Trended Lab Data:  Recent Labs   Lab 01/20/19  6793  01/20/19 2330 01/26/19 2055   WBC 7.43  --  7.49   HGB 12.9  --  12.4   HCT 38.7  --  37.9     --  147*   MCV 92  --  93   RDW 14.2  --  14.6*   NA  --  140 141   K  --  4.2 4.5   CL  --  102 103   CO2  --  31* 30*   BUN  --  26* 19   CREATININE  --  0.75 0.8   GLU  --  102 96   PROT  --  6.8 6.9   ALBUMIN  --  3.7 3.9   BILITOT  --  0.5 0.6   AST  --  18 18   ALKPHOS  --  98 114   ALT  --  12 9*       Trended Cardiac Data:  Recent Labs   Lab 01/21/19  0509 01/26/19 2055 01/26/19 2336   TROPONINI <0.012 0.014 0.027*   BNP  --  168*  --        Other Results:  EKG (my interpretation): NSR, LAD, Incomplete bundle, no acute ST or T wave changes    Radiology (my interpretation):  CXR pending     Assessment and Plan:     Elevated Troponin  - atypical chest pain while lying down to sleep, self-resolved in minutes, recent admission last week with same presentation, 3 negative troponins and unchanged echo last week  - 1st trop negative, 2nd trop 0.027, small elevation likely just due to her degree of heart failure  - EKG with no acute ischemic changes, unchanged incomplete BBB  - received  in ED  - monitor on tele  - continue home asa, statin, coreg, lisinopril  - check TSH, lipid panel, A1c    CAD of Native Vessel  - unknown cardiac history but reports no stents in the past  - asa, statin    Nonischemic Heart Failure with reduced EF  - recent echo with EF 25%  - small troponin elevation could be caused by this alone  - , LE edema, clear lungs, ordered CXR  - lasix 40mg IV for 1 dose (home dose 40 po daily)  - continue asa, statin, BB, ACEi  - strict I&O's, daily weights    Essential Hyppertension  - continue coreg, lisinopril    Hyperlipidemia  - continue lipitor 20    Dementia  - continue effexor 10mg daily  - stable and at baseline    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  -  No signs of acute infection now    H/o CVA  - no residual deficits  - continue asa, lipitor    Mixed Urinary Incontinence  - continue home flomax and oxybutynin  - monitor closely while on diuretics    Right Breast Mass  - Chronic >1 year, told by her Physician that this needs no acute intervention, family wants no aggressive management anyway  - Will hold off on any further imaging      Diet: cardiac, fluid restrict 2L  VTE PPx: heparin  Code: full    Dispo: place in observation for serial troponins    Renard Garcia MD  [facility]    [facility] Medicine Hospitalist Pager numbers:   [facility] Hospitalist Medicine Team A (Zay/Clarisse): [phone]  [facility] Hospitalist Medicine Team B (Hernán/Lanre):  [phone]

## 2019-01-27 NOTE — PLAN OF CARE
Problem: Adult Inpatient Plan of Care  Goal: Plan of Care Review  Outcome: Ongoing (interventions implemented as appropriate)  Will continue to monitor.

## 2019-01-27 NOTE — ED PROVIDER NOTES
Encounter Date: 1/26/2019    SCRIBE #1 NOTE: I, Mariya Lundberg, am scribing for, and in the presence of,  Dr. Matthews. I have scribed the entire note.       History     Chief Complaint   Patient presents with    Chest Pain     Patient reports left side chest pain around 1915 that radiates to left arm. Recieved 1 nitro and 325 mg aspirin via EMS.     Gloria Zuleta is a 89 y.o. female who  has a past medical history of Breast CA, CAD (coronary artery disease), CHF (congestive heart failure), Hypertension, Labial abscess, Lymphoma, and Osteoarthritis.    The patient presents to the ED due to chest pain. She reports onset of symptoms was about 1.5 hr ago. The patient states she had heaviness in the chest which has since resolved. She denies any associated shortness of breath, palpitations, leg swelling, nausea, vomiting, cough, congestion, fever or chills.  The patient was evaluated and admitted for chest pain 6 days ago. She is uncertain if current pain is similar to previous chest pain. Of note the patient has a history of Dementia      The history is provided by the patient. The history is limited by the condition of the patient.     Review of patient's allergies indicates:   Allergen Reactions    Bactrim [sulfamethoxazole-trimethoprim]     Codeine     Cortisone     Keflex [cephalexin]     Pcn [penicillins]     Sulfadiazine     Sumycin 250     Contrast media Other (See Comments)     unknown    Donnatal [phenobarb-hyoscy-atropine-scop] Other (See Comments)     Unknown     Restoril [temazepam] Other (See Comments)     unknown     Past Medical History:   Diagnosis Date    Breast CA     right    CAD (coronary artery disease)     CHF (congestive heart failure)     Hypertension     Labial abscess     Lymphoma     creceived chemo treatment    Osteoarthritis      Past Surgical History:   Procedure Laterality Date    ABSCESS DRAINAGE      labia    COLONOSCOPY      dialtion      urethral dialtion     ESOPHAGOGASTRODUODENOSCOPY      HEART CATH-LEFT Right 11/22/2016    [facility]    HYSTERECTOMY      total abdominal    ALEXANDRU    NECK EXPLORATION      exploratory lap & Bx of  RIGHT neck    UPPER GASTROINTESTINAL ENDOSCOPY       Family History   Problem Relation Age of Onset    No Known Problems Mother     No Known Problems Father      Social History     Tobacco Use    Smoking status: Never Smoker    Smokeless tobacco: Never Used   Substance Use Topics    Alcohol use: No    Drug use: No     Review of Systems   Constitutional: Negative for chills and fever.   HENT: Negative for congestion, rhinorrhea and sore throat.    Eyes: Negative for redness and visual disturbance.   Respiratory: Negative for cough, shortness of breath and wheezing.    Cardiovascular: Negative for chest pain and palpitations.   Gastrointestinal: Negative for abdominal pain, diarrhea, nausea and vomiting.   Genitourinary: Negative for dysuria and hematuria.   Musculoskeletal: Negative for back pain, myalgias and neck pain.   Skin: Negative for rash.   Neurological: Negative for dizziness, weakness and light-headedness.   Psychiatric/Behavioral: Negative for confusion.       Physical Exam     Initial Vitals [01/26/19 2045]   BP Pulse Resp Temp SpO2   129/84 100 20 97.6 °F (36.4 °C) 97 %      MAP       --         Physical Exam    Nursing note and vitals reviewed.  Constitutional: She appears well-developed and well-nourished. She is not diaphoretic. No distress.   HENT:   Head: Normocephalic and atraumatic.   Mouth/Throat: Oropharynx is clear and moist.   Eyes: Conjunctivae and EOM are normal. Pupils are equal, round, and reactive to light.   Neck: Normal range of motion. Neck supple.   Cardiovascular: Normal rate, regular rhythm and normal heart sounds. Exam reveals no gallop and no friction rub.    No murmur heard.  Pulmonary/Chest: Breath sounds normal. She has no wheezes. She has no rhonchi.  She has no rales.   Abdominal: Soft. Bowel sounds are normal. There is no tenderness. There is no rebound and no guarding.   Musculoskeletal: Normal range of motion. She exhibits no edema or tenderness.   Lymphadenopathy:     She has no cervical adenopathy.   Neurological: She is alert and oriented to person, place, and time. She has normal strength.   Demonstrates evidence of Dementia   Skin: Skin is warm and dry. Capillary refill takes less than 2 seconds. No rash noted.         ED Course   Procedures  Labs Reviewed   CBC W/ AUTO DIFFERENTIAL - Abnormal; Notable for the following components:       Result Value    RDW 14.6 (*)     Platelets 147 (*)     All other components within normal limits   COMPREHENSIVE METABOLIC PANEL - Abnormal; Notable for the following components:    CO2 30 (*)     Calcium 10.6 (*)     ALT 9 (*)     All other components within normal limits   B-TYPE NATRIURETIC PEPTIDE - Abnormal; Notable for the following components:     (*)     All other components within normal limits   TROPONIN I - Abnormal; Notable for the following components:    Troponin I 0.027 (*)     All other components within normal limits   TROPONIN I     EKG Readings: (Independently Interpreted)   Sinus tachycardia with a rate of 103.  Left anterior fascicular block. LVH with QRS widening and repolarization. NC interval of 186 ms. QRS of 122ms. No STEMI       Imaging Results    None          Medical Decision Making:   Independently Interpreted Test(s):   I have ordered and independently interpreted EKG Reading(s) - see prior notes  Clinical Tests:   Lab Tests: Ordered and Reviewed  Medical Tests: Ordered and Reviewed  ED Management:  11:34 PM On re-check, patient asked if she was aware she had a lump in her right breast that she states is new since her last visit here. The patient has 2 x 3 cm palpable mass to right upper breast that is non-tender and she is being evaluated for the mass as per medicl record. Also  patient denies any chest pain while in the ED.            medical decision making:  Patient without chest pain while here in the emergency department her repeat troponin is elevated relative to the initial an are normal range and review shows that this is higher than it has been  previously tested patient will be admitted for observation and cardiac about  Discussed with the [facility] nurse practitioner for admission           Clinical Impression:     1. Troponin I above reference range    2. Chest pain           I, Dr. Stoney Matthews, personally performed the services described in this documentation.   All medical record entries made by the scribe were at my direction and in my presence.   I have reviewed the chart and agree that the record is accurate and complete.   Stoney Matthews MD.  12:31 AM 01/27/2019                    Stonye Matthews MD  01/27/19 0032

## 2019-01-27 NOTE — PLAN OF CARE
TN faxed Facility Transfer Orders to [facility], [place], [phone], fax [phone].    Floor nurse to call report to Evelin, [phone]    TN contacted pt's son, Manjinder Zuleta [phone], to inform him that pt discharging back to NH    Transportation to be arranged through [facility], (for updates call [phone]    Future Appointments   Date Time Provider Department Center   1/30/2019 10:30 AM [facility] US2 EPIQ 7 [facility] ULTRA [facility]   1/30/2019 11:15 AM [facility] US2 EPIQ 7 [facility] ULTRA [facility]   2/11/2019  2:40 PM Julita Fernandes MD [facility]       Pt's nurse will go over medications/signs and symptoms prior to discharge       01/27/19 1205   Final Note   Assessment Type Final Discharge Note   Anticipated Discharge Disposition group home [facility]  ([facility])   What phone number can be called within the next 1-3 days to see how you are doing after discharge? [phone]   Hospital Follow Up  Appt(s) scheduled? No  (Offices closed for weekend. Patient to schedule own follow up appointment.)   Right Care Referral Info   Post Acute Recommendation Other  (group home Nursing Home)   Facility Name [facility]     Claudia Meneses, RN Transitional Navigator  [phone]

## 2019-02-11 ENCOUNTER — OFFICE VISIT (OUTPATIENT)
Dept: FAMILY MEDICINE | Facility: CLINIC | Age: 84
End: 2019-02-11
Payer: MEDICARE

## 2019-02-11 VITALS
HEART RATE: 86 BPM | OXYGEN SATURATION: 95 % | TEMPERATURE: 98 F | SYSTOLIC BLOOD PRESSURE: 114 MMHG | DIASTOLIC BLOOD PRESSURE: 72 MMHG

## 2019-02-11 DIAGNOSIS — I25.10 CAD IN NATIVE ARTERY: Primary | ICD-10-CM

## 2019-02-11 DIAGNOSIS — L89.154 SACRAL DECUBITUS ULCER, STAGE IV: ICD-10-CM

## 2019-02-11 DIAGNOSIS — N63.15 BREAST LUMP ON RIGHT SIDE AT 12 O'CLOCK POSITION: ICD-10-CM

## 2019-02-11 DIAGNOSIS — C50.911 MALIGNANT NEOPLASM OF RIGHT FEMALE BREAST, UNSPECIFIED ESTROGEN RECEPTOR STATUS, UNSPECIFIED SITE OF BREAST: ICD-10-CM

## 2019-02-11 DIAGNOSIS — I10 ESSENTIAL HYPERTENSION: ICD-10-CM

## 2019-02-11 DIAGNOSIS — I50.42 CHRONIC COMBINED SYSTOLIC AND DIASTOLIC HEART FAILURE: ICD-10-CM

## 2019-02-11 PROBLEM — I24.9 ACUTE CORONARY SYNDROME: Status: RESOLVED | Noted: 2018-06-27 | Resolved: 2019-02-11

## 2019-02-11 PROCEDURE — 99999 PR PBB SHADOW E&M-EST. PATIENT-LVL III: ICD-10-PCS | Mod: PBBFAC,,, | Performed by: INTERNAL MEDICINE

## 2019-02-11 PROCEDURE — 3288F FALL RISK ASSESSMENT DOCD: CPT | Mod: CPTII,S$GLB,, | Performed by: INTERNAL MEDICINE

## 2019-02-11 PROCEDURE — 99214 OFFICE O/P EST MOD 30 MIN: CPT | Mod: S$GLB,,, | Performed by: INTERNAL MEDICINE

## 2019-02-11 PROCEDURE — 99499 RISK ADDL DX/OHS AUDIT: ICD-10-PCS | Mod: S$GLB,,, | Performed by: INTERNAL MEDICINE

## 2019-02-11 PROCEDURE — 3288F PR FALLS RISK ASSESSMENT DOCUMENTED: ICD-10-PCS | Mod: CPTII,S$GLB,, | Performed by: INTERNAL MEDICINE

## 2019-02-11 PROCEDURE — 99999 PR PBB SHADOW E&M-EST. PATIENT-LVL III: CPT | Mod: PBBFAC,,, | Performed by: INTERNAL MEDICINE

## 2019-02-11 PROCEDURE — 99499 UNLISTED E&M SERVICE: CPT | Mod: S$GLB,,, | Performed by: INTERNAL MEDICINE

## 2019-02-11 PROCEDURE — 1100F PTFALLS ASSESS-DOCD GE2>/YR: CPT | Mod: CPTII,S$GLB,, | Performed by: INTERNAL MEDICINE

## 2019-02-11 PROCEDURE — 1100F PR PT FALLS ASSESS DOC 2+ FALLS/FALL W/INJURY/YR: ICD-10-PCS | Mod: CPTII,S$GLB,, | Performed by: INTERNAL MEDICINE

## 2019-02-11 PROCEDURE — 99214 PR OFFICE/OUTPT VISIT, EST, LEVL IV, 30-39 MIN: ICD-10-PCS | Mod: S$GLB,,, | Performed by: INTERNAL MEDICINE

## 2019-02-11 RX ORDER — NITROGLYCERIN 0.6 MG/1
0.4 TABLET SUBLINGUAL EVERY 5 MIN PRN
Status: ON HOLD | COMMUNITY
End: 2020-09-11 | Stop reason: HOSPADM

## 2019-02-11 NOTE — PATIENT INSTRUCTIONS
I have reviewed the PMH, SH and FH for this patient. Continue current medications. She seems to be doing fine. Let me know if anything changes. Follow-up with Dr Jaime as recommended.

## 2019-02-13 NOTE — PROGRESS NOTES
Subjective:       Patient ID: Gloria Zuleta is a 89 y.o. female.    Chief Complaint: Anxiety (went to ED with Chest pains, dx with anxiety)     I have reviewed the PMH, SH and FH for this patient. She is accompanied by her son who is her caregiver. She was recently hospitalized for chest pain. She was evaluated by Dr Jaime. She saw him last week for a recheck. She has a breast mass in her right breast. She reports that it does not give her any pain. She had an ECHO which showed that she had an EF of 25%. Her son reports that she her EF is improving. Her legs are still a little swollen, but they are better than they were. She had an elevated troponin in the hospital but they did not think she had an MI. She was sent home with a diagnosis of anxiety.     She also has a history of a sacral decubitus wound. It is getting better.       Active Ambulatory Problems     Diagnosis Date Noted    CAD in native artery 11/22/2016    NICM (nonischemic cardiomyopathy) 11/22/2016    Essential hypertension 11/22/2016    Hyperlipemia 11/22/2016    Dementia associated with other underlying disease with behavioral disturbance 02/22/2017    Elevated troponin 02/22/2017    Urinary incontinence 02/22/2017    Rectal prolapse 02/22/2017    Decubitus ulcer of sacral region, stage 4 03/14/2017    History of CVA in adulthood 02/20/2018    Chest pain 06/25/2018    Breast lump on right side at 12 o'clock position 06/26/2018    Chronic osteomyelitis of sacrum 06/26/2018    Sacral decubitus ulcer, stage IV 06/27/2018    History of lymphoma 10/18/2018    Malignant neoplasm of right female breast 10/18/2018    Hx of total knee replacement, right 10/18/2018    Venous stasis dermatitis of both lower extremities 10/18/2018    Depression 10/18/2018    Lymphadenopathy of head and neck 10/18/2018    Chronic congestive heart failure 10/18/2018    Troponin I above reference range 01/27/2019    Chronic combined systolic and diastolic  heart failure      Resolved Ambulatory Problems     Diagnosis Date Noted    Urethral stenosis 01/04/2016    Slow urinary stream 01/04/2016    Nocturia 01/04/2016    Urinary frequency 01/04/2016    Cystitis cystica 02/01/2016    Recurrent UTI 05/25/2016    SOB (shortness of breath) 11/22/2016    SVEN (acute kidney injury) 02/22/2017    Hypotension 02/22/2017    Hyperkalemia 02/22/2017    Urinary tract infection without hematuria 02/23/2017    Severe malnutrition 02/23/2017    Benzodiazepine dependence 02/20/2018    Acute coronary syndrome 06/27/2018     Past Medical History:   Diagnosis Date    Breast CA     CAD (coronary artery disease)     CHF (congestive heart failure)     Hypertension     Labial abscess     Lymphoma     Osteoarthritis        HEALTH MAINTENANCE:   Health Maintenance   Topic Date Due    TETANUS VACCINE  08/29/1947    Lipid Panel  01/27/2024    Zoster Vaccine  Completed    Influenza Vaccine  Completed       Review of Systems   Constitutional: Negative for chills, fatigue and fever.   HENT: Negative for congestion, ear discharge, ear pain, rhinorrhea and sore throat.    Eyes: Negative for discharge, redness and itching.   Respiratory: Negative for cough, chest tightness, shortness of breath and wheezing.    Cardiovascular: Positive for chest pain and leg swelling. Negative for palpitations.   Gastrointestinal: Negative for abdominal pain, constipation, diarrhea, nausea and vomiting.   Endocrine: Negative for cold intolerance and heat intolerance.   Genitourinary: Negative for dysuria, flank pain, frequency and hematuria.   Musculoskeletal: Negative for arthralgias, back pain, joint swelling and myalgias.   Skin: Negative for color change and rash.   Neurological: Negative for dizziness, tremors, numbness and headaches.   Psychiatric/Behavioral: Negative for dysphoric mood and sleep disturbance. The patient is not nervous/anxious.        Objective:          LABS    CMP  Sodium    Date Value Ref Range Status   01/26/2019 141 136 - 145 mmol/L Final     Potassium   Date Value Ref Range Status   01/26/2019 4.5 3.5 - 5.1 mmol/L Final     Chloride   Date Value Ref Range Status   01/26/2019 103 95 - 110 mmol/L Final     CO2   Date Value Ref Range Status   01/26/2019 30 (H) 23 - 29 mmol/L Final     Glucose   Date Value Ref Range Status   01/26/2019 96 70 - 110 mg/dL Final     BUN, Bld   Date Value Ref Range Status   01/26/2019 19 8 - 23 mg/dL Final     Creatinine   Date Value Ref Range Status   01/26/2019 0.8 0.5 - 1.4 mg/dL Final     Calcium   Date Value Ref Range Status   01/26/2019 10.6 (H) 8.7 - 10.5 mg/dL Final     Total Protein   Date Value Ref Range Status   01/26/2019 6.9 6.0 - 8.4 g/dL Final     Albumin   Date Value Ref Range Status   01/26/2019 3.9 3.5 - 5.2 g/dL Final     Total Bilirubin   Date Value Ref Range Status   01/26/2019 0.6 0.1 - 1.0 mg/dL Final     Comment:     For infants and newborns, interpretation of results should be based  on gestational age, weight and in agreement with clinical  observations.  Premature Infant recommended reference ranges:  Up to 24 hours.............<8.0 mg/dL  Up to 48 hours............<12.0 mg/dL  3-5 days..................<15.0 mg/dL  6-29 days.................<15.0 mg/dL       Alkaline Phosphatase   Date Value Ref Range Status   01/26/2019 114 55 - 135 U/L Final     AST   Date Value Ref Range Status   01/26/2019 18 10 - 40 U/L Final     ALT   Date Value Ref Range Status   01/26/2019 9 (L) 10 - 44 U/L Final     Anion Gap   Date Value Ref Range Status   01/26/2019 8 8 - 16 mmol/L Final     eGFR if    Date Value Ref Range Status   01/26/2019 >60 >60 mL/min/1.73 m^2 Final     eGFR if non    Date Value Ref Range Status   01/26/2019 >60 >60 mL/min/1.73 m^2 Final     Comment:     Calculation used to obtain the estimated glomerular filtration  rate (eGFR) is the CKD-EPI equation.          Lab Results   Component Value Date     WBC 7.49 01/26/2019    HGB 12.4 01/26/2019    HCT 37.9 01/26/2019    MCV 93 01/26/2019     (L) 01/26/2019       Recent Labs   Lab Result Units 01/27/19  0534   TSH uIU/mL 0.752   HDL mg/dL 43   Cholesterol mg/dL 128   Triglycerides mg/dL 76   LDL Cholesterol mg/dL 69.8   HDL/Chol Ratio % 33.6   Non-HDL Cholesterol mg/dL 85   Total Cholesterol/HDL Ratio  3.0         A1C:  Recent Labs   Lab Result Units 01/27/19  0534   Hemoglobin A1C % 5.0         Physical Exam   Constitutional: She appears well-developed and well-nourished.   HENT:   Head: Normocephalic and atraumatic.   Right Ear: External ear normal. Tympanic membrane is not erythematous. No middle ear effusion.   Left Ear: External ear normal. Tympanic membrane is not erythematous.  No middle ear effusion.   Mouth/Throat: No posterior oropharyngeal edema or posterior oropharyngeal erythema. No tonsillar exudate.   Eyes: Conjunctivae and EOM are normal. Pupils are equal, round, and reactive to light.   Neck: No thyromegaly present.   Cardiovascular: Normal rate, regular rhythm, normal heart sounds and intact distal pulses.   No murmur heard.  Pulmonary/Chest: Effort normal and breath sounds normal. She has no wheezes.   Abdominal: She exhibits no distension. There is no tenderness.   Musculoskeletal: She exhibits no edema or tenderness.        Right ankle: She exhibits swelling. She exhibits normal range of motion.        Left ankle: She exhibits swelling. She exhibits normal range of motion.   Lymphadenopathy:     She has no cervical adenopathy.   Neurological: She displays normal reflexes. No cranial nerve deficit.   Skin: Skin is warm and dry. No rash noted.   Psychiatric: She has a normal mood and affect. Her behavior is normal.             Assessment and Plan:     Problem List Items Addressed This Visit        Derm    Sacral decubitus ulcer, stage IV - continue wound care. Getting better.        Cardiac/Vascular    CAD in native artery - Primary -  she sees Dr Jaime. Elevated troponin in hospital.       Essential hypertension - bp looks good on current meds.       Chronic combined systolic and diastolic heart failure - chronic swelling. Her EF was 25 % on recent labs.        Renal/    Breast lump on right side at 12 o'clock position - they do not want any treatment. She reports that it does not cause her pain.        Oncology    Malignant neoplasm of right female breast - as above.                  Follow-up with me in 6 weeks. .

## 2019-02-13 NOTE — PROGRESS NOTES
Patient, Gloria Zuleta (MRN #[id number]), presented with a recent Platelet count less than 150 K/uL consistent with the definition of thrombocytopenia (ICD10 - D69.6).    Platelets   Date Value Ref Range Status   01/26/2019 147 (L) 150 - 350 K/uL Final     The patient's thrombocytopenia was monitored, evaluated, addressed and/or treated. This addendum to the medical record is made on 02/12/2019.

## 2019-04-01 ENCOUNTER — HOSPITAL ENCOUNTER (EMERGENCY)
Facility: HOSPITAL | Age: 84
Discharge: HOME | End: 2019-04-01
Attending: EMERGENCY MEDICINE
Payer: MEDICARE

## 2019-04-01 VITALS
SYSTOLIC BLOOD PRESSURE: 168 MMHG | OXYGEN SATURATION: 95 % | DIASTOLIC BLOOD PRESSURE: 76 MMHG | RESPIRATION RATE: 18 BRPM | TEMPERATURE: 98 F | HEART RATE: 77 BPM

## 2019-04-01 DIAGNOSIS — K62.3 RECTAL PROLAPSE: Primary | ICD-10-CM

## 2019-04-01 PROCEDURE — 99282 EMERGENCY DEPT VISIT SF MDM: CPT

## 2019-04-01 NOTE — ED NOTES
Pt aaox3 no distress noted, pt assisted to bedside commode to urinate, will continue to monitor , rectum prolapsed again, ER MD notified, and placed rectum back in, after sugar placed on prolapse

## 2019-04-01 NOTE — ED TRIAGE NOTES
Pt from [facility] with prolapsed rectum, slight bl;eeding noted, ER Md notified and placed back in

## 2019-04-01 NOTE — CONSULTS
Surgery Consult Note    Consult Note    History of present illness:  Patient is an 88 yo F who presents with rectal prolapse.  Patient has a long history of rectal prolapse, she has been worked up by her surgeon for this problem since 2016. Family of the patient states that it was recommended that they do not have surgery, although they were referred to a colorectal surgeon several years ago.  They are unsure if they ever attended the appointment with the colorectal surgeon.  In the ER, patient has prolapsed 3 separate times, each time with successful reduction.  On interview, patient is very pleasant but demented, unable to give an accurate history.  Patient also has HTN and congestive heart failure.  Ejection fraction of <25%.    When speaking to the family, they are vehemently opposed to surgery.    Past Medical History:   Diagnosis Date    Breast CA     right    CAD (coronary artery disease)     CHF (congestive heart failure)     Hypertension     Labial abscess     Lymphoma     creceived chemo treatment    Osteoarthritis      Past Surgical History:   Procedure Laterality Date    ABSCESS DRAINAGE      labia    COLONOSCOPY      dialtion      urethral dialtion    ESOPHAGOGASTRODUODENOSCOPY      HEART CATH-LEFT Right 11/22/2016    Performed by Lamberto Jaime MD at [facility] CATH LAB    HYSTERECTOMY      total abdominal    ALEXANDRU    NECK EXPLORATION      exploratory lap & Bx of  RIGHT neck    UPPER GASTROINTESTINAL ENDOSCOPY       Family History   Problem Relation Age of Onset    No Known Problems Mother     No Known Problems Father      Social History     Tobacco Use    Smoking status: Never Smoker    Smokeless tobacco: Never Used   Substance Use Topics    Alcohol use: No    Drug use: No     Review of patient's allergies indicates:   Allergen Reactions    Bactrim [sulfamethoxazole-trimethoprim]     Codeine     Cortisone     Keflex [cephalexin]     Pcn [penicillins]     Sulfadiazine      Sumycin 250     Contrast media Other (See Comments)     unknown    Donnatal [phenobarb-hyoscy-atropine-scop] Other (See Comments)     Unknown     Restoril [temazepam] Other (See Comments)     unknown     Review of systems: see HPI; otherwise, 12-point ROS negative.     Vitals:    19 1447   BP: (!) 126/56   Pulse: 86   Resp: 16   Temp: 97.6 °F (36.4 °C)       Gen: no acute distress. Alert and oriented x3. Non-toxic appearing.   HEENT: normocephalic and atraumatic. EOMI. Moist mucous membranes. Trachea midline.   Neck: supple. Normal ROM.  Resp: unlabored respirations. Stable on room air.  CV: regular rate.  Abd: soft, nondistended, nontender.  Old lower abdominal scar, likely from   Anus: currently not prolapsed  Ext: warm and well perfused. No clubbing, cyanosis, or edema.  Neuro: CN grossly intact. No focal neurological deficits.      Assessment and plan:  89 yo F with rectal prolapse, CAD, CHF, and HTN as well as dementia.  She resides in a nursing home.  She was found to have her rectum prolapsed today, alghouth this has been an issue for several years.    -No noninvasive intervention to prevent rectal prolapse  -Recommend evaluation for repair by colorectal surgeon    Daryl Staples MD  [facility]

## 2019-04-01 NOTE — ED PROVIDER NOTES
Encounter Date: 4/1/2019    SCRIBE #1 NOTE: I, Dahiana Gaytan, am scribing for, and in the presence of,  Dr. Martinez. I have scribed the entire note.       History     Chief Complaint   Patient presents with    prolapsed rectum     sent from [facility] for prolasped rectum. family states this has happened many times      Time seen by provider: 2:13 PM  This is a 89 y.o. female with a PMHx of HTN, CAD, and CHF who presents to the Emergency Department via EMS from [facility] with a cc of recurrent rectal prolapse today. She states she had just urinated when she noticed the prolapsed rectum. She denies fever, abdominal pain, rectal pain, or any other medical complaints. Son reports a prior history of similar symptoms.         The history is provided by the patient.     Review of patient's allergies indicates:   Allergen Reactions    Bactrim [sulfamethoxazole-trimethoprim]     Codeine     Cortisone     Keflex [cephalexin]     Pcn [penicillins]     Sulfadiazine     Sumycin 250     Contrast media Other (See Comments)     unknown    Donnatal [phenobarb-hyoscy-atropine-scop] Other (See Comments)     Unknown     Restoril [temazepam] Other (See Comments)     unknown     Past Medical History:   Diagnosis Date    Breast CA     right    CAD (coronary artery disease)     CHF (congestive heart failure)     Hypertension     Labial abscess     Lymphoma     creceived chemo treatment    Osteoarthritis      Past Surgical History:   Procedure Laterality Date    ABSCESS DRAINAGE      labia    COLONOSCOPY      dialtion      urethral dialtion    ESOPHAGOGASTRODUODENOSCOPY      HEART CATH-LEFT Right 11/22/2016    Performed by [facility]ime [facility] at [facility] CATH LAB    HYSTERECTOMY      total abdominal    ALEXANDRU    NECK EXPLORATION      exploratory lap & Bx of  RIGHT neck    UPPER GASTROINTESTINAL ENDOSCOPY       Family History   Problem Relation Age of Onset    No Known Problems Mother     No  Known Problems Father      Social History     Tobacco Use    Smoking status: Never Smoker    Smokeless tobacco: Never Used   Substance Use Topics    Alcohol use: No    Drug use: No     Review of Systems   Constitutional: Negative for chills, fatigue and fever.   HENT: Negative for facial swelling, trouble swallowing and voice change.    Eyes: Negative for photophobia, pain and redness.   Respiratory: Negative for cough, choking and shortness of breath.    Cardiovascular: Negative for chest pain, palpitations and leg swelling.   Gastrointestinal: Negative for abdominal pain, diarrhea, nausea, rectal pain and vomiting.        Positive for rectal prolapse.   Genitourinary: Negative for dysuria, frequency and urgency.   Musculoskeletal: Negative for back pain, neck pain and neck stiffness.   Neurological: Negative for seizures, speech difficulty, light-headedness, numbness and headaches.   All other systems reviewed and are negative.      Physical Exam     Initial Vitals [04/01/19 1447]   BP Pulse Resp Temp SpO2   (!) 126/56 86 16 97.6 °F (36.4 °C) 96 %      MAP       --         Physical Exam    Nursing note and vitals reviewed.  Constitutional: She appears well-developed and well-nourished. No distress.   HENT:   Head: Normocephalic and atraumatic.   Mouth/Throat: Oropharynx is clear and moist.   Eyes: Conjunctivae are normal. Pupils are equal, round, and reactive to light.   Neck: Normal range of motion. Neck supple.   Cardiovascular: Normal rate, regular rhythm and normal heart sounds. Exam reveals no gallop and no friction rub.    No murmur heard.  Pulmonary/Chest: Breath sounds normal. She has no wheezes. She has no rhonchi. She has no rales.   Abdominal: Soft. There is no tenderness.   Genitourinary:   Genitourinary Comments: Rectal prolapse with no bleeding noted.   Musculoskeletal: Normal range of motion. She exhibits edema. She exhibits no tenderness.   LE edema bilaterally.   Neurological: She is alert and  oriented to person, place, and time. She has normal strength. No cranial nerve deficit.   Skin: Skin is warm and dry. Capillary refill takes less than 2 seconds.   Psychiatric: She has a normal mood and affect. Her behavior is normal.         ED Course   Procedures  Labs Reviewed - No data to display       Imaging Results    None          Medical Decision Making:   ED Management:  89-year-old female with a rectal prolapse.  Prolapse was reduced 3 times here in the ED.  Patient then had family members show up that informed me this happens quite often and there are instructions at her nursing home for staff to reduce the prolapse.  For some reason this was not done today and she was sent to the ED.  Patient has not had no complaints while in the ED.  She will be discharged back to her nursing home in stable condition to follow up as needed.                      Clinical Impression:     1. Rectal prolapse            Disposition:   Disposition: Discharged  Condition: Stable    I, Dr. Phi Martinez, personally performed the services described in this documentation. All medical record entries made by the scribe were at my direction and in my presence. I have reviewed the chart and agree that the record reflects my personal performance and is accurate and complete. Phi Martinez MD.  6:44 PM 04/01/2019                   Phi Martinez MD  04/01/19 [id number]

## 2019-04-01 NOTE — ED NOTES
Pt up to bed side commode, urinated and rectum prolapsed again, ER MD notified and placed back in

## 2019-04-01 NOTE — ED TRIAGE NOTES
sent from [facility] for prolasped rectum. family states this has happened many times

## 2020-04-16 ENCOUNTER — LAB VISIT (OUTPATIENT)
Dept: LAB | Facility: HOSPITAL | Age: 85
End: 2020-04-16
Payer: MEDICARE

## 2020-04-16 DIAGNOSIS — Z20.822 SUSPECTED COVID-19 VIRUS INFECTION: ICD-10-CM

## 2020-04-16 PROCEDURE — U0002 COVID-19 LAB TEST NON-CDC: HCPCS

## 2020-04-17 LAB — SARS-COV-2 RNA RESP QL NAA+PROBE: DETECTED

## 2020-09-06 PROBLEM — N30.00 ACUTE CYSTITIS WITHOUT HEMATURIA: Status: ACTIVE | Noted: 2020-09-06

## 2020-09-06 PROBLEM — K80.10 CALCULUS OF GALLBLADDER WITH CHRONIC CHOLECYSTITIS WITHOUT OBSTRUCTION: Status: ACTIVE | Noted: 2020-09-06

## 2020-09-06 PROBLEM — K92.0 HEMATEMESIS WITH NAUSEA: Status: ACTIVE | Noted: 2020-09-06

## 2020-09-06 PROBLEM — N20.0 BILATERAL NEPHROLITHIASIS: Status: ACTIVE | Noted: 2020-09-06

## 2020-09-06 PROBLEM — A41.9 SEPSIS: Status: ACTIVE | Noted: 2020-09-06

## 2020-09-06 PROBLEM — R10.9 ABDOMINAL PAIN: Status: ACTIVE | Noted: 2020-09-06

## 2020-09-06 PROBLEM — K21.9 GASTROESOPHAGEAL REFLUX DISEASE: Status: ACTIVE | Noted: 2020-09-06

## 2020-09-07 ENCOUNTER — HOSPITAL ENCOUNTER (INPATIENT)
Facility: OTHER | Age: 85
LOS: 4 days | Discharge: NURSING, REHAB OR LONG-TERM CARE | DRG: 871 | End: 2020-09-11
Attending: INTERNAL MEDICINE | Admitting: INTERNAL MEDICINE
Payer: MEDICARE

## 2020-09-07 DIAGNOSIS — B96.4 URINARY TRACT INFECTION DUE TO PROTEUS: ICD-10-CM

## 2020-09-07 DIAGNOSIS — I50.42 CHRONIC COMBINED SYSTOLIC AND DIASTOLIC HEART FAILURE: ICD-10-CM

## 2020-09-07 DIAGNOSIS — R78.81 GRAM-NEGATIVE BACTEREMIA: ICD-10-CM

## 2020-09-07 DIAGNOSIS — K62.3 PARTIAL RECTAL PROLAPSE: ICD-10-CM

## 2020-09-07 DIAGNOSIS — A41.9 SEPSIS: ICD-10-CM

## 2020-09-07 DIAGNOSIS — K80.20 CALCULUS OF GALLBLADDER WITHOUT CHOLECYSTITIS WITHOUT OBSTRUCTION: ICD-10-CM

## 2020-09-07 DIAGNOSIS — I10 ESSENTIAL HYPERTENSION: ICD-10-CM

## 2020-09-07 DIAGNOSIS — K80.10 CALCULUS OF GALLBLADDER WITH CHRONIC CHOLECYSTITIS WITHOUT OBSTRUCTION: ICD-10-CM

## 2020-09-07 DIAGNOSIS — A41.50 SEPSIS DUE TO GRAM-NEGATIVE UTI: ICD-10-CM

## 2020-09-07 DIAGNOSIS — G30.1 LATE ONSET ALZHEIMER'S DISEASE WITHOUT BEHAVIORAL DISTURBANCE: Primary | ICD-10-CM

## 2020-09-07 DIAGNOSIS — N39.0 SEPSIS DUE TO GRAM-NEGATIVE UTI: ICD-10-CM

## 2020-09-07 DIAGNOSIS — N39.0 URINARY TRACT INFECTION DUE TO PROTEUS: ICD-10-CM

## 2020-09-07 DIAGNOSIS — A41.59 PROTEUS SEPTICEMIA: ICD-10-CM

## 2020-09-07 DIAGNOSIS — K92.2 GI BLEED: ICD-10-CM

## 2020-09-07 DIAGNOSIS — F02.80 LATE ONSET ALZHEIMER'S DISEASE WITHOUT BEHAVIORAL DISTURBANCE: Primary | ICD-10-CM

## 2020-09-07 LAB
ABO + RH BLD: NORMAL
ALBUMIN SERPL BCP-MCNC: 2.7 G/DL (ref 3.5–5.2)
ALP SERPL-CCNC: 71 U/L (ref 55–135)
ALT SERPL W/O P-5'-P-CCNC: 6 U/L (ref 10–44)
ANION GAP SERPL CALC-SCNC: 9 MMOL/L (ref 8–16)
AST SERPL-CCNC: 13 U/L (ref 10–40)
BASOPHILS # BLD AUTO: 0.02 K/UL (ref 0–0.2)
BASOPHILS # BLD AUTO: ABNORMAL K/UL (ref 0–0.2)
BASOPHILS # BLD AUTO: ABNORMAL K/UL (ref 0–0.2)
BASOPHILS NFR BLD: 0 % (ref 0–1.9)
BASOPHILS NFR BLD: 0 % (ref 0–1.9)
BASOPHILS NFR BLD: 0.1 % (ref 0–1.9)
BILIRUB SERPL-MCNC: 0.8 MG/DL (ref 0.1–1)
BLD GP AB SCN CELLS X3 SERPL QL: NORMAL
BUN SERPL-MCNC: 55 MG/DL (ref 10–30)
CALCIUM SERPL-MCNC: 9.6 MG/DL (ref 8.7–10.5)
CHLORIDE SERPL-SCNC: 106 MMOL/L (ref 95–110)
CO2 SERPL-SCNC: 23 MMOL/L (ref 23–29)
CREAT SERPL-MCNC: 1.4 MG/DL (ref 0.5–1.4)
DIFFERENTIAL METHOD: ABNORMAL
EOSINOPHIL # BLD AUTO: 0 K/UL (ref 0–0.5)
EOSINOPHIL # BLD AUTO: ABNORMAL K/UL (ref 0–0.5)
EOSINOPHIL # BLD AUTO: ABNORMAL K/UL (ref 0–0.5)
EOSINOPHIL NFR BLD: 0 % (ref 0–8)
ERYTHROCYTE [DISTWIDTH] IN BLOOD BY AUTOMATED COUNT: 15.2 % (ref 11.5–14.5)
ERYTHROCYTE [DISTWIDTH] IN BLOOD BY AUTOMATED COUNT: 15.3 % (ref 11.5–14.5)
ERYTHROCYTE [DISTWIDTH] IN BLOOD BY AUTOMATED COUNT: 15.3 % (ref 11.5–14.5)
EST. GFR  (AFRICAN AMERICAN): 38 ML/MIN/1.73 M^2
EST. GFR  (NON AFRICAN AMERICAN): 33 ML/MIN/1.73 M^2
GLUCOSE SERPL-MCNC: 96 MG/DL (ref 70–110)
HCT VFR BLD AUTO: 41.2 % (ref 37–48.5)
HCT VFR BLD AUTO: 41.7 % (ref 37–48.5)
HCT VFR BLD AUTO: 44.3 % (ref 37–48.5)
HGB BLD-MCNC: 13.7 G/DL (ref 12–16)
HGB BLD-MCNC: 14.1 G/DL (ref 12–16)
HGB BLD-MCNC: 14.5 G/DL (ref 12–16)
IMM GRANULOCYTES # BLD AUTO: 0.05 K/UL (ref 0–0.04)
IMM GRANULOCYTES # BLD AUTO: ABNORMAL K/UL (ref 0–0.04)
IMM GRANULOCYTES # BLD AUTO: ABNORMAL K/UL (ref 0–0.04)
IMM GRANULOCYTES NFR BLD AUTO: 0.4 % (ref 0–0.5)
IMM GRANULOCYTES NFR BLD AUTO: ABNORMAL % (ref 0–0.5)
IMM GRANULOCYTES NFR BLD AUTO: ABNORMAL % (ref 0–0.5)
LYMPHOCYTES # BLD AUTO: 0.6 K/UL (ref 1–4.8)
LYMPHOCYTES # BLD AUTO: ABNORMAL K/UL (ref 1–4.8)
LYMPHOCYTES # BLD AUTO: ABNORMAL K/UL (ref 1–4.8)
LYMPHOCYTES NFR BLD: 4.5 % (ref 18–48)
LYMPHOCYTES NFR BLD: 5 % (ref 18–48)
LYMPHOCYTES NFR BLD: 5 % (ref 18–48)
MCH RBC QN AUTO: 29.7 PG (ref 27–31)
MCH RBC QN AUTO: 30 PG (ref 27–31)
MCH RBC QN AUTO: 30.4 PG (ref 27–31)
MCHC RBC AUTO-ENTMCNC: 32.7 G/DL (ref 32–36)
MCHC RBC AUTO-ENTMCNC: 33.3 G/DL (ref 32–36)
MCHC RBC AUTO-ENTMCNC: 33.8 G/DL (ref 32–36)
MCV RBC AUTO: 90 FL (ref 82–98)
MCV RBC AUTO: 90 FL (ref 82–98)
MCV RBC AUTO: 91 FL (ref 82–98)
MONOCYTES # BLD AUTO: 0.6 K/UL (ref 0.3–1)
MONOCYTES # BLD AUTO: ABNORMAL K/UL (ref 0.3–1)
MONOCYTES # BLD AUTO: ABNORMAL K/UL (ref 0.3–1)
MONOCYTES NFR BLD: 1 % (ref 4–15)
MONOCYTES NFR BLD: 4 % (ref 4–15)
MONOCYTES NFR BLD: 5 % (ref 4–15)
NEUTROPHILS # BLD AUTO: 12.6 K/UL (ref 1.8–7.7)
NEUTROPHILS NFR BLD: 87 % (ref 38–73)
NEUTROPHILS NFR BLD: 89 % (ref 38–73)
NEUTROPHILS NFR BLD: 91 % (ref 38–73)
NEUTS BAND NFR BLD MANUAL: 3 %
NEUTS BAND NFR BLD MANUAL: 5 %
NRBC BLD-RTO: 0 /100 WBC
PLATELET # BLD AUTO: 132 K/UL (ref 150–350)
PLATELET # BLD AUTO: 143 K/UL (ref 150–350)
PLATELET # BLD AUTO: 151 K/UL (ref 150–350)
PLATELET BLD QL SMEAR: ABNORMAL
PLATELET BLD QL SMEAR: ABNORMAL
PMV BLD AUTO: 10.1 FL (ref 9.2–12.9)
PMV BLD AUTO: 10.8 FL (ref 9.2–12.9)
PMV BLD AUTO: 9.5 FL (ref 9.2–12.9)
POTASSIUM SERPL-SCNC: 3.9 MMOL/L (ref 3.5–5.1)
PROT SERPL-MCNC: 5.5 G/DL (ref 6–8.4)
RBC # BLD AUTO: 4.57 M/UL (ref 4–5.4)
RBC # BLD AUTO: 4.64 M/UL (ref 4–5.4)
RBC # BLD AUTO: 4.88 M/UL (ref 4–5.4)
SODIUM SERPL-SCNC: 138 MMOL/L (ref 136–145)
TROPONIN I SERPL DL<=0.01 NG/ML-MCNC: 0.03 NG/ML (ref 0–0.03)
WBC # BLD AUTO: 11.04 K/UL (ref 3.9–12.7)
WBC # BLD AUTO: 11.81 K/UL (ref 3.9–12.7)
WBC # BLD AUTO: 13.85 K/UL (ref 3.9–12.7)

## 2020-09-07 PROCEDURE — 25000003 PHARM REV CODE 250: Performed by: INTERNAL MEDICINE

## 2020-09-07 PROCEDURE — 85027 COMPLETE CBC AUTOMATED: CPT

## 2020-09-07 PROCEDURE — 94761 N-INVAS EAR/PLS OXIMETRY MLT: CPT

## 2020-09-07 PROCEDURE — 63600175 PHARM REV CODE 636 W HCPCS: Performed by: PHYSICIAN ASSISTANT

## 2020-09-07 PROCEDURE — C9113 INJ PANTOPRAZOLE SODIUM, VIA: HCPCS | Performed by: PHYSICIAN ASSISTANT

## 2020-09-07 PROCEDURE — 27000221 HC OXYGEN, UP TO 24 HOURS

## 2020-09-07 PROCEDURE — C9113 INJ PANTOPRAZOLE SODIUM, VIA: HCPCS | Performed by: INTERNAL MEDICINE

## 2020-09-07 PROCEDURE — 11000001 HC ACUTE MED/SURG PRIVATE ROOM

## 2020-09-07 PROCEDURE — 25000003 PHARM REV CODE 250: Performed by: PHYSICIAN ASSISTANT

## 2020-09-07 PROCEDURE — 93010 EKG 12-LEAD: ICD-10-PCS | Mod: ,,, | Performed by: INTERNAL MEDICINE

## 2020-09-07 PROCEDURE — 93010 ELECTROCARDIOGRAM REPORT: CPT | Mod: ,,, | Performed by: INTERNAL MEDICINE

## 2020-09-07 PROCEDURE — 84484 ASSAY OF TROPONIN QUANT: CPT

## 2020-09-07 PROCEDURE — 85025 COMPLETE CBC W/AUTO DIFF WBC: CPT

## 2020-09-07 PROCEDURE — 99291 CRITICAL CARE FIRST HOUR: CPT | Mod: ,,, | Performed by: INTERNAL MEDICINE

## 2020-09-07 PROCEDURE — 85007 BL SMEAR W/DIFF WBC COUNT: CPT

## 2020-09-07 PROCEDURE — 36415 COLL VENOUS BLD VENIPUNCTURE: CPT

## 2020-09-07 PROCEDURE — 63600175 PHARM REV CODE 636 W HCPCS: Performed by: INTERNAL MEDICINE

## 2020-09-07 PROCEDURE — 80053 COMPREHEN METABOLIC PANEL: CPT

## 2020-09-07 PROCEDURE — 99291 PR CRITICAL CARE, E/M 30-74 MINUTES: ICD-10-PCS | Mod: ,,, | Performed by: INTERNAL MEDICINE

## 2020-09-07 PROCEDURE — 86901 BLOOD TYPING SEROLOGIC RH(D): CPT

## 2020-09-07 PROCEDURE — 93005 ELECTROCARDIOGRAM TRACING: CPT

## 2020-09-07 RX ORDER — HYDRALAZINE HYDROCHLORIDE 20 MG/ML
10 INJECTION INTRAMUSCULAR; INTRAVENOUS EVERY 8 HOURS PRN
Status: DISCONTINUED | OUTPATIENT
Start: 2020-09-07 | End: 2020-09-07

## 2020-09-07 RX ORDER — TAMSULOSIN HYDROCHLORIDE 0.4 MG/1
0.4 CAPSULE ORAL DAILY
Status: DISCONTINUED | OUTPATIENT
Start: 2020-09-07 | End: 2020-09-11 | Stop reason: HOSPADM

## 2020-09-07 RX ORDER — LISINOPRIL 2.5 MG/1
5 TABLET ORAL DAILY
Status: DISCONTINUED | OUTPATIENT
Start: 2020-09-07 | End: 2020-09-07

## 2020-09-07 RX ORDER — LEVOFLOXACIN 5 MG/ML
250 INJECTION, SOLUTION INTRAVENOUS
Status: DISCONTINUED | OUTPATIENT
Start: 2020-09-07 | End: 2020-09-08

## 2020-09-07 RX ORDER — IBUPROFEN 200 MG
16 TABLET ORAL
Status: DISCONTINUED | OUTPATIENT
Start: 2020-09-07 | End: 2020-09-11 | Stop reason: HOSPADM

## 2020-09-07 RX ORDER — POLYETHYLENE GLYCOL 3350 17 G/17G
17 POWDER, FOR SOLUTION ORAL 2 TIMES DAILY
Status: DISCONTINUED | OUTPATIENT
Start: 2020-09-07 | End: 2020-09-11 | Stop reason: HOSPADM

## 2020-09-07 RX ORDER — IBUPROFEN 200 MG
24 TABLET ORAL
Status: DISCONTINUED | OUTPATIENT
Start: 2020-09-07 | End: 2020-09-11 | Stop reason: HOSPADM

## 2020-09-07 RX ORDER — ONDANSETRON 2 MG/ML
4 INJECTION INTRAMUSCULAR; INTRAVENOUS EVERY 8 HOURS PRN
Status: DISCONTINUED | OUTPATIENT
Start: 2020-09-07 | End: 2020-09-11 | Stop reason: HOSPADM

## 2020-09-07 RX ORDER — CARVEDILOL 3.12 MG/1
3.12 TABLET ORAL 2 TIMES DAILY
Status: DISCONTINUED | OUTPATIENT
Start: 2020-09-07 | End: 2020-09-11 | Stop reason: HOSPADM

## 2020-09-07 RX ORDER — PANTOPRAZOLE SODIUM 40 MG/10ML
40 INJECTION, POWDER, LYOPHILIZED, FOR SOLUTION INTRAVENOUS 2 TIMES DAILY
Status: DISCONTINUED | OUTPATIENT
Start: 2020-09-07 | End: 2020-09-07

## 2020-09-07 RX ORDER — DOCUSATE SODIUM 100 MG/1
100 CAPSULE, LIQUID FILLED ORAL DAILY
Status: DISCONTINUED | OUTPATIENT
Start: 2020-09-07 | End: 2020-09-11 | Stop reason: HOSPADM

## 2020-09-07 RX ORDER — LACTULOSE 10 G/15ML
20 SOLUTION ORAL 3 TIMES DAILY
Status: DISCONTINUED | OUTPATIENT
Start: 2020-09-07 | End: 2020-09-07

## 2020-09-07 RX ORDER — ACETAMINOPHEN 325 MG/1
650 TABLET ORAL EVERY 4 HOURS PRN
Status: DISCONTINUED | OUTPATIENT
Start: 2020-09-07 | End: 2020-09-11 | Stop reason: HOSPADM

## 2020-09-07 RX ORDER — SODIUM CHLORIDE 9 MG/ML
INJECTION, SOLUTION INTRAVENOUS CONTINUOUS
Status: DISCONTINUED | OUTPATIENT
Start: 2020-09-07 | End: 2020-09-07

## 2020-09-07 RX ORDER — LEVOFLOXACIN 5 MG/ML
500 INJECTION, SOLUTION INTRAVENOUS
Status: DISCONTINUED | OUTPATIENT
Start: 2020-09-07 | End: 2020-09-07 | Stop reason: DRUGHIGH

## 2020-09-07 RX ORDER — CARVEDILOL 3.12 MG/1
3.12 TABLET ORAL 2 TIMES DAILY WITH MEALS
Status: DISCONTINUED | OUTPATIENT
Start: 2020-09-07 | End: 2020-09-07

## 2020-09-07 RX ORDER — ATORVASTATIN CALCIUM 20 MG/1
40 TABLET, FILM COATED ORAL DAILY
Status: DISCONTINUED | OUTPATIENT
Start: 2020-09-07 | End: 2020-09-11 | Stop reason: HOSPADM

## 2020-09-07 RX ORDER — SODIUM CHLORIDE 0.9 % (FLUSH) 0.9 %
10 SYRINGE (ML) INJECTION
Status: DISCONTINUED | OUTPATIENT
Start: 2020-09-07 | End: 2020-09-11 | Stop reason: HOSPADM

## 2020-09-07 RX ORDER — GLUCAGON 1 MG
1 KIT INJECTION
Status: DISCONTINUED | OUTPATIENT
Start: 2020-09-07 | End: 2020-09-11 | Stop reason: HOSPADM

## 2020-09-07 RX ADMIN — DEXTROSE 8 MG/HR: 50 INJECTION, SOLUTION INTRAVENOUS at 06:09

## 2020-09-07 RX ADMIN — POLYETHYLENE GLYCOL 3350 17 G: 17 POWDER, FOR SOLUTION ORAL at 08:09

## 2020-09-07 RX ADMIN — CARVEDILOL 3.12 MG: 3.12 TABLET, FILM COATED ORAL at 08:09

## 2020-09-07 RX ADMIN — DEXTROSE 8 MG/HR: 50 INJECTION, SOLUTION INTRAVENOUS at 11:09

## 2020-09-07 RX ADMIN — DOCUSATE SODIUM 100 MG: 100 CAPSULE, LIQUID FILLED ORAL at 03:09

## 2020-09-07 RX ADMIN — CARVEDILOL 3.12 MG: 3.12 TABLET, FILM COATED ORAL at 09:09

## 2020-09-07 RX ADMIN — DEXTROSE 8 MG/HR: 50 INJECTION, SOLUTION INTRAVENOUS at 01:09

## 2020-09-07 RX ADMIN — DEXTROSE 8 MG/HR: 50 INJECTION, SOLUTION INTRAVENOUS at 10:09

## 2020-09-07 RX ADMIN — LEVOFLOXACIN 250 MG: 250 INJECTION, SOLUTION INTRAVENOUS at 03:09

## 2020-09-07 RX ADMIN — DEXTROSE 8 MG/HR: 50 INJECTION, SOLUTION INTRAVENOUS at 04:09

## 2020-09-07 RX ADMIN — TAMSULOSIN HYDROCHLORIDE 0.4 MG: 0.4 CAPSULE ORAL at 08:09

## 2020-09-07 RX ADMIN — ATORVASTATIN CALCIUM 40 MG: 20 TABLET, FILM COATED ORAL at 08:09

## 2020-09-07 RX ADMIN — SODIUM CHLORIDE: 0.9 INJECTION, SOLUTION INTRAVENOUS at 01:09

## 2020-09-07 NOTE — ASSESSMENT & PLAN NOTE
- Ms. Gloria Zuleta is transferred to [facility] 2/2 suspected upper GI bleed  - she initially presented to the ED with N/V and epigastric abdominal pain  - she had no emesis in the ED and did not report bloody emesis in ED or upon admission   - after eating a few bites of her dinner on the first night of her stay, she had black emesis  - H&H did drop two points to 14.1 from 16.0 at time of admission    - HGB of around 14 is her baseline, so this was felt be more likely represent hemodilution as she has been receiving IV fluids   - GI bleed order set initiated   - GI consulted   - PPI drip started

## 2020-09-07 NOTE — ASSESSMENT & PLAN NOTE
- Cr 1.2 upon admission  - baseline appears to be 0.8   - continue to gently hydrate given HFrEF w/ EF 25%   - avoid nephrotoxins, renally dose meds   - monitor AM labs

## 2020-09-07 NOTE — H&P
[facility] Hospital Medicine  History & Physical    Patient Name: Gloria Zuleta  MRN: [id number]  Admission Date: 9/7/2020  Attending Physician: Tessa Puentes MD   Primary Care Provider: Geoffrey Harrison MD         Patient information was obtained from patient, past medical records and ER records.     Subjective:     Principal Problem:GI bleed    Chief Complaint: No chief complaint on file.       HPI: Ms. Gloria Zuleta is a 89 y.o. female, with PMH of CAD, HFrEF, HTN, NICM, chronic sacral osteomyelitis, Stage IV sacral decubitus ulcer, Urinary incontinence, Dementia, HLD, who was admitted to [facility] ED on 9/6/20 with epigastric abdominal pain, nausea and vomiting x 1 day. ED evaluation showed a UTI with Sepsis. A CT showed renal stones bilaterally, US showed gallstones with gallbladder thickening associated with chronic cholecystitis without bile duct dilation or signs of acute infection. The patient was started on IV antibiotics, and admitted to the hospitalist service. On her first day of admission, shortly after starting ot eat dinner, she had an episode of nausea and vomiting, during which she vomited black liquid. Given concern for UGIB, she was started on IV Protonix, and H&H reassessed. She did have a drop from 16.0 to 14.1, which was favored to be 2/2 IV fluid dilution at the second CBC is her baseline HGB. Given lack of GI coverage at [facility], she was transferred to [facility] where she will be admitted to inpatient status to the ICU.     Past Medical History:   Diagnosis Date    Breast CA     right    CAD (coronary artery disease)     CHF (congestive heart failure)     Hypertension     Labial abscess     Lymphoma     creceived chemo treatment    Osteoarthritis        Past Surgical History:   Procedure Laterality Date    ABSCESS DRAINAGE      labia    COLONOSCOPY      dialtion      urethral dialtion    ESOPHAGOGASTRODUODENOSCOPY      HYSTERECTOMY      total abdominal    ALEXANDRU     NECK EXPLORATION      exploratory lap & Bx of  RIGHT neck    UPPER GASTROINTESTINAL ENDOSCOPY         Review of patient's allergies indicates:   Allergen Reactions    Bactrim [sulfamethoxazole-trimethoprim]     Codeine     Cortisone     Keflex [cephalexin]     Pcn [penicillins]     Sulfadiazine     Sumycin 250     Contrast media Other (See Comments)     unknown    Donnatal [phenobarb-hyoscy-atropine-scop] Other (See Comments)     Unknown     Restoril [temazepam] Other (See Comments)     unknown       Current Facility-Administered Medications on File Prior to Encounter   Medication    [COMPLETED] aspirin tablet 325 mg    [COMPLETED] ciprofloxacin (CIPRO)400mg/200ml D5W IVPB 400 mg    [COMPLETED] dicyclomine capsule 20 mg    [COMPLETED] fentaNYL injection 25 mcg    [COMPLETED] ondansetron injection 4 mg    [COMPLETED] sodium chloride 0.9% bolus 1,000 mL    [COMPLETED] sodium chloride 0.9% bolus 500 mL    [DISCONTINUED] 0.9%  NaCl infusion    [DISCONTINUED] acetaminophen tablet 650 mg    [DISCONTINUED] aspirin chewable tablet 81 mg    [DISCONTINUED] atorvastatin tablet 40 mg    [DISCONTINUED] carvediloL tablet 3.125 mg    [DISCONTINUED] dextrose 50% injection 12.5 g    [DISCONTINUED] dextrose 50% injection 25 g    [DISCONTINUED] famotidine tablet 20 mg    [DISCONTINUED] glucagon (human recombinant) injection 1 mg    [DISCONTINUED] glucose chewable tablet 16 g    [DISCONTINUED] glucose chewable tablet 24 g    [DISCONTINUED] heparin (porcine) injection 5,000 Units    [DISCONTINUED] hydrALAZINE injection 10 mg    [DISCONTINUED] lactulose 20 gram/30 mL solution Soln 20 g    [DISCONTINUED] levoFLOXacin 500 mg/100 mL IVPB 500 mg    [DISCONTINUED] lisinopriL tablet 5 mg    [DISCONTINUED] ondansetron injection 4 mg    [DISCONTINUED] pantoprazole injection 40 mg    [DISCONTINUED] sodium chloride 0.9% bolus 1,000 mL    [DISCONTINUED] sodium chloride 0.9% flush 10 mL    [DISCONTINUED]  tamsulosin 24 hr capsule 0.4 mg     Current Outpatient Medications on File Prior to Encounter   Medication Sig    ascorbic acid, vitamin C, (VITAMIN C) 500 MG tablet Take 500 mg by mouth once daily.    aspirin 81 MG Chew Take 1 tablet (81 mg total) by mouth once daily.    atorvastatin (LIPITOR) 40 MG tablet Take 1 tablet (40 mg total) by mouth once daily.    carvedilol (COREG) 3.125 MG tablet Take 1 tablet (3.125 mg total) by mouth 2 (two) times daily with meals.    collagenase ointment Apply topically once daily. To sacral wound    escitalopram oxalate (LEXAPRO) 10 MG tablet Take 1 tablet (10 mg total) by mouth once daily.    furosemide (LASIX) 40 MG tablet Take 1 tablet (40 mg total) by mouth once daily.    lisinopril (PRINIVIL,ZESTRIL) 5 MG tablet Take 1 tablet (5 mg total) by mouth once daily.    MULTIVIT-IRON-MIN-FOLIC ACID 3,500-18-0.4 UNIT-MG-MG ORAL CHEW Take by mouth.    nitroglycerin (NITROSTAT) 0.6 MG Subl Place 0.4 mg under the tongue every 5 (five) minutes as needed.    tamsulosin (FLOMAX) 0.4 mg Cap Take 1 capsule (0.4 mg total) by mouth once daily.    tolterodine (DETROL LA) 4 MG 24 hr capsule Take 1 capsule (4 mg total) by mouth once daily.     Family History     Problem Relation (Age of Onset)    No Known Problems Mother, Father        Tobacco Use    Smoking status: Never Smoker    Smokeless tobacco: Never Used   Substance and Sexual Activity    Alcohol use: No    Drug use: No    Sexual activity: Not Currently     Review of Systems   Constitutional: Negative for chills, diaphoresis and fever.   Respiratory: Negative for cough, shortness of breath and wheezing.    Cardiovascular: Negative for chest pain and palpitations.   Gastrointestinal: Positive for abdominal pain, nausea and vomiting. Negative for abdominal distention, blood in stool, constipation and diarrhea.   Genitourinary: Negative for decreased urine volume, dysuria, flank pain, frequency, hematuria and urgency.   Skin:  Negative for color change, pallor, rash and wound.   Neurological: Negative for dizziness, syncope, weakness, light-headedness and numbness.   Psychiatric/Behavioral: Negative for confusion and decreased concentration.     Objective:     Vital Signs (Most Recent):  Temp: 98.9 °F (37.2 °C) (09/07/20 0030)  Pulse: 104 (09/07/20 0100)  Resp: 19 (09/07/20 0100)  BP: (!) 110/56 (09/07/20 0100)  SpO2: 97 % (09/07/20 0100) Vital Signs (24h Range):  Temp:  [96.3 °F (35.7 °C)-99.3 °F (37.4 °C)] 98.9 °F (37.2 °C)  Pulse:  [102-119] 104  Resp:  [17-24] 19  SpO2:  [90 %-100 %] 97 %  BP: (104-162)/(56-89) 110/56     Weight: 50.9 kg (112 lb 3.4 oz)  Body mass index is 22.66 kg/m².    Physical Exam  Vitals signs and nursing note reviewed.   Constitutional:       General: She is not in acute distress.     Appearance: Normal appearance. She is well-developed and normal weight. She is not diaphoretic.   HENT:      Head: Normocephalic and atraumatic.      Mouth/Throat:      Mouth: Mucous membranes are moist.   Eyes:      General: No scleral icterus.     Conjunctiva/sclera: Conjunctivae normal.      Pupils: Pupils are equal, round, and reactive to light.   Neck:      Musculoskeletal: Normal range of motion and neck supple.      Trachea: No tracheal deviation.   Cardiovascular:      Rate and Rhythm: Tachycardia present.      Pulses: Normal pulses.      Heart sounds: Normal heart sounds. No murmur. No gallop.       Comments: Occasional added beats, underlying rhythm seems normal.   Pulmonary:      Effort: Pulmonary effort is normal. No respiratory distress.      Breath sounds: Normal breath sounds. No stridor. No wheezing or rales.   Abdominal:      General: Bowel sounds are normal. There is no distension.      Palpations: Abdomen is soft. There is no mass.      Tenderness: There is no abdominal tenderness. There is no guarding.   Musculoskeletal: Normal range of motion.         General: No deformity (arthritic deformities of fingers of  b/l hands).   Skin:     General: Skin is warm and dry.      Coloration: Skin is not pale.      Findings: No erythema or rash.   Neurological:      General: No focal deficit present.      Mental Status: She is alert and oriented to person, place, and time.      Cranial Nerves: No cranial nerve deficit.      Motor: No abnormal muscle tone.   Psychiatric:         Behavior: Behavior normal.         Thought Content: Thought content normal.         Judgment: Judgment normal.           CRANIAL NERVES     CN III, IV, VI   Pupils are equal, round, and reactive to light.       Significant Labs:   BMP:   Recent Labs   Lab 09/06/20  0831   *      K 4.0   CL 98   CO2 28   BUN 47*   CREATININE 1.21   CALCIUM 10.7*     CBC:   Recent Labs   Lab 09/06/20  0831 09/06/20  2101 09/07/20  0400   WBC 19.00* 16.94* 13.85*   HGB 16.0 14.1 14.1   HCT 48.1 42.4 41.7    166 151     CMP:   Recent Labs   Lab 09/06/20  0831      K 4.0   CL 98   CO2 28   *   BUN 47*   CREATININE 1.21   CALCIUM 10.7*   PROT 7.9   ALBUMIN 4.6   BILITOT 0.9   ALKPHOS 107   AST 30   ALT 26   ANIONGAP 14   EGFRNONAA 39.2*     Urine Culture: No results for input(s): LABURIN in the last 48 hours.  Urine Studies:   Recent Labs   Lab 09/06/20  0954   COLORU Yellow   APPEARANCEUA Hazy*   PHUR 8.0   SPECGRAV 1.020   PROTEINUA 1+*   GLUCUA Negative   KETONESU Negative   BILIRUBINUA Negative   OCCULTUA Trace*   NITRITE Negative   UROBILINOGEN Negative   LEUKOCYTESUR 3+*   RBCUA 10*   WBCUA 60*   BACTERIA Moderate*   SQUAMEPITHEL 1   HYALINECASTS 2*     All pertinent labs within the past 24 hours have been reviewed.    Significant Imaging: I have reviewed all pertinent imaging results/findings within the past 24 hours.          Assessment/Plan:     * GI bleed  - Ms. Gloria Zuleta is transferred to [facility] 2/2 suspected upper GI bleed  - she initially presented to the ED with N/V and epigastric abdominal pain  - she had no emesis in the ED and  did not report bloody emesis in ED or upon admission   - after eating a few bites of her dinner on the first night of her stay, she had black emesis  - H&H did drop two points to 14.1 from 16.0 at time of admission    - HGB of around 14 is her baseline, so this was felt be more likely represent hemodilution as she has been receiving IV fluids   - GI bleed order set initiated   - GI consulted   - PPI drip started       Sepsis  - suspected 2/2 UTI   - on Levaquin  - somewhat tachycardic with mild hypotension   - continue gently IV fluids   - last lactic acid not elevated  - blood cultures ordered    - anaerobic bottle with gram negative rods   - urine cultures pending       Urinary tract infection without hematuria  - started on levaquin upon admission  - Urine cultures pending   - anaerobic blood culture grown gram negative rods     SVEN (acute kidney injury)  - Cr 1.2 upon admission  - baseline appears to be 0.8   - continue to gently hydrate given HFrEF w/ EF 25%   - avoid nephrotoxins, renally dose meds   - monitor AM labs     Calculus of gallbladder with chronic cholecystitis without obstruction  - noted upon ED imaging   - no signs of acute infection   - monitor       Chronic combined systolic and diastolic heart failure  - last Echo with EF 25%   - appears compensated at present   - monitor I&O and daily weights       Decubitus ulcer of sacral region, stage 4  - wound care consult ordered  - blanching erythema at sacrum noted at present       Dementia associated with other underlying disease with behavioral disturbance  - delirium precautions ordered     Hyperlipemia  - continue statin  - last lipid panel:  Results for ETHAN BEY (MRN [id number]) as of 9/7/2020 01:32   Ref. Range 1/27/2019 05:34   Cholesterol Latest Ref Range: 120 - 199 mg/dL 128   HDL Latest Ref Range: 40 - 75 mg/dL 43   Hdl/Cholesterol Ratio Latest Ref Range: 20.0 - 50.0 % 33.6   LDL Cholesterol External Latest Ref Range: 63.0 - 159.0  mg/dL 69.8   Non-HDL Cholesterol Latest Units: mg/dL 85   Total Cholesterol/HDL Ratio Latest Ref Range: 2.0 - 5.0  3.0   Triglycerides Latest Ref Range: 30 - 150 mg/dL 76         Essential hypertension  - hypotensive upon admission  - holding home BP meds   - monitor blood pressure       CAD in native artery  - continuing statin  - holding ASA in light of UGIB       VTE Risk Mitigation (From admission, onward)         Ordered     IP VTE HIGH RISK PATIENT  Once      09/07/20 0052     Place sequential compression device  Until discontinued      09/07/20 0052     Reason for No Pharmacological VTE Prophylaxis  Once     Question:  Reasons:  Answer:  Active Bleeding    09/07/20 0052     Place MINE hose  Until discontinued      09/07/20 0030                   Brianne Lehman PA-C  Department of Hospital Medicine   [facility]

## 2020-09-07 NOTE — EICU
New Patient Evaluation    89 F history of combined HF EF 25%, hypertension, dyslipidemia, sacral decubitus with osteomyelitis, presented at [facility] ED with abdominal pain, nausea and vomiting, aggravated by eating. She denied any bloody emesis nor melanotic stools.  Abdominal CT with nephrolithiasis, cholelithiasis and chronic cholecystitis. UA WBC 60.  She was initially managed as UTI but vomiting recurred and this time it was described as dark.  Due to concern for GI bleed patient was transferred to [facility] ICU.       9/6/2020 08:31 9/6/2020 21:01   Hemoglobin 16.0 14.1   Hematocrit 48.1 42.4     · GI bleed on PPI, GI consulted  · On levofloxacin for UTI, monitor QT  · Wound care consulted for decubitus

## 2020-09-07 NOTE — SUBJECTIVE & OBJECTIVE
Past Medical History:   Diagnosis Date    Breast CA     right    CAD (coronary artery disease)     CHF (congestive heart failure)     Hypertension     Labial abscess     Lymphoma     creceived chemo treatment    Osteoarthritis        Past Surgical History:   Procedure Laterality Date    ABSCESS DRAINAGE      labia    COLONOSCOPY      dialtion      urethral dialtion    ESOPHAGOGASTRODUODENOSCOPY      HYSTERECTOMY      total abdominal    ALEXANDRU    NECK EXPLORATION      exploratory lap & Bx of  RIGHT neck    UPPER GASTROINTESTINAL ENDOSCOPY         Review of patient's allergies indicates:   Allergen Reactions    Bactrim [sulfamethoxazole-trimethoprim]     Codeine     Cortisone     Keflex [cephalexin]     Pcn [penicillins]     Sulfadiazine     Sumycin 250     Contrast media Other (See Comments)     unknown    Donnatal [phenobarb-hyoscy-atropine-scop] Other (See Comments)     Unknown     Restoril [temazepam] Other (See Comments)     unknown       Current Facility-Administered Medications on File Prior to Encounter   Medication    [COMPLETED] aspirin tablet 325 mg    [COMPLETED] ciprofloxacin (CIPRO)400mg/200ml D5W IVPB 400 mg    [COMPLETED] dicyclomine capsule 20 mg    [COMPLETED] fentaNYL injection 25 mcg    [COMPLETED] ondansetron injection 4 mg    [COMPLETED] sodium chloride 0.9% bolus 1,000 mL    [COMPLETED] sodium chloride 0.9% bolus 500 mL    [DISCONTINUED] 0.9%  NaCl infusion    [DISCONTINUED] acetaminophen tablet 650 mg    [DISCONTINUED] aspirin chewable tablet 81 mg    [DISCONTINUED] atorvastatin tablet 40 mg    [DISCONTINUED] carvediloL tablet 3.125 mg    [DISCONTINUED] dextrose 50% injection 12.5 g    [DISCONTINUED] dextrose 50% injection 25 g    [DISCONTINUED] famotidine tablet 20 mg    [DISCONTINUED] glucagon (human recombinant) injection 1 mg    [DISCONTINUED] glucose chewable tablet 16 g    [DISCONTINUED] glucose chewable tablet 24 g    [DISCONTINUED] heparin  (porcine) injection 5,000 Units    [DISCONTINUED] hydrALAZINE injection 10 mg    [DISCONTINUED] lactulose 20 gram/30 mL solution Soln 20 g    [DISCONTINUED] levoFLOXacin 500 mg/100 mL IVPB 500 mg    [DISCONTINUED] lisinopriL tablet 5 mg    [DISCONTINUED] ondansetron injection 4 mg    [DISCONTINUED] pantoprazole injection 40 mg    [DISCONTINUED] sodium chloride 0.9% bolus 1,000 mL    [DISCONTINUED] sodium chloride 0.9% flush 10 mL    [DISCONTINUED] tamsulosin 24 hr capsule 0.4 mg     Current Outpatient Medications on File Prior to Encounter   Medication Sig    ascorbic acid, vitamin C, (VITAMIN C) 500 MG tablet Take 500 mg by mouth once daily.    aspirin 81 MG Chew Take 1 tablet (81 mg total) by mouth once daily.    atorvastatin (LIPITOR) 40 MG tablet Take 1 tablet (40 mg total) by mouth once daily.    carvedilol (COREG) 3.125 MG tablet Take 1 tablet (3.125 mg total) by mouth 2 (two) times daily with meals.    collagenase ointment Apply topically once daily. To sacral wound    escitalopram oxalate (LEXAPRO) 10 MG tablet Take 1 tablet (10 mg total) by mouth once daily.    furosemide (LASIX) 40 MG tablet Take 1 tablet (40 mg total) by mouth once daily.    lisinopril (PRINIVIL,ZESTRIL) 5 MG tablet Take 1 tablet (5 mg total) by mouth once daily.    MULTIVIT-IRON-MIN-FOLIC ACID 3,500-18-0.4 UNIT-MG-MG ORAL CHEW Take by mouth.    nitroglycerin (NITROSTAT) 0.6 MG Subl Place 0.4 mg under the tongue every 5 (five) minutes as needed.    tamsulosin (FLOMAX) 0.4 mg Cap Take 1 capsule (0.4 mg total) by mouth once daily.    tolterodine (DETROL LA) 4 MG 24 hr capsule Take 1 capsule (4 mg total) by mouth once daily.     Family History     Problem Relation (Age of Onset)    No Known Problems Mother, Father        Tobacco Use    Smoking status: Never Smoker    Smokeless tobacco: Never Used   Substance and Sexual Activity    Alcohol use: No    Drug use: No    Sexual activity: Not Currently     Review of  Systems   Constitutional: Negative for chills, diaphoresis and fever.   Respiratory: Negative for cough, shortness of breath and wheezing.    Cardiovascular: Negative for chest pain and palpitations.   Gastrointestinal: Positive for abdominal pain, nausea and vomiting. Negative for abdominal distention, blood in stool, constipation and diarrhea.   Genitourinary: Negative for decreased urine volume, dysuria, flank pain, frequency, hematuria and urgency.   Skin: Negative for color change, pallor, rash and wound.   Neurological: Negative for dizziness, syncope, weakness, light-headedness and numbness.   Psychiatric/Behavioral: Negative for confusion and decreased concentration.     Objective:     Vital Signs (Most Recent):  Temp: 98.9 °F (37.2 °C) (09/07/20 0030)  Pulse: 104 (09/07/20 0100)  Resp: 19 (09/07/20 0100)  BP: (!) 110/56 (09/07/20 0100)  SpO2: 97 % (09/07/20 0100) Vital Signs (24h Range):  Temp:  [96.3 °F (35.7 °C)-99.3 °F (37.4 °C)] 98.9 °F (37.2 °C)  Pulse:  [102-119] 104  Resp:  [17-24] 19  SpO2:  [90 %-100 %] 97 %  BP: (104-162)/(56-89) 110/56     Weight: 50.9 kg (112 lb 3.4 oz)  Body mass index is 22.66 kg/m².    Physical Exam  Vitals signs and nursing note reviewed.   Constitutional:       General: She is not in acute distress.     Appearance: Normal appearance. She is well-developed and normal weight. She is not diaphoretic.   HENT:      Head: Normocephalic and atraumatic.      Mouth/Throat:      Mouth: Mucous membranes are moist.   Eyes:      General: No scleral icterus.     Conjunctiva/sclera: Conjunctivae normal.      Pupils: Pupils are equal, round, and reactive to light.   Neck:      Musculoskeletal: Normal range of motion and neck supple.      Trachea: No tracheal deviation.   Cardiovascular:      Rate and Rhythm: Tachycardia present.      Pulses: Normal pulses.      Heart sounds: Normal heart sounds. No murmur. No gallop.       Comments: Occasional added beats, underlying rhythm seems normal.    Pulmonary:      Effort: Pulmonary effort is normal. No respiratory distress.      Breath sounds: Normal breath sounds. No stridor. No wheezing or rales.   Abdominal:      General: Bowel sounds are normal. There is no distension.      Palpations: Abdomen is soft. There is no mass.      Tenderness: There is no abdominal tenderness. There is no guarding.   Musculoskeletal: Normal range of motion.         General: No deformity (arthritic deformities of fingers of b/l hands).   Skin:     General: Skin is warm and dry.      Coloration: Skin is not pale.      Findings: No erythema or rash.   Neurological:      General: No focal deficit present.      Mental Status: She is alert and oriented to person, place, and time.      Cranial Nerves: No cranial nerve deficit.      Motor: No abnormal muscle tone.   Psychiatric:         Behavior: Behavior normal.         Thought Content: Thought content normal.         Judgment: Judgment normal.           CRANIAL NERVES     CN III, IV, VI   Pupils are equal, round, and reactive to light.       Significant Labs:   BMP:   Recent Labs   Lab 09/06/20  0831   *      K 4.0   CL 98   CO2 28   BUN 47*   CREATININE 1.21   CALCIUM 10.7*     CBC:   Recent Labs   Lab 09/06/20  0831 09/06/20  2101 09/07/20  0400   WBC 19.00* 16.94* 13.85*   HGB 16.0 14.1 14.1   HCT 48.1 42.4 41.7    166 151     CMP:   Recent Labs   Lab 09/06/20  0831      K 4.0   CL 98   CO2 28   *   BUN 47*   CREATININE 1.21   CALCIUM 10.7*   PROT 7.9   ALBUMIN 4.6   BILITOT 0.9   ALKPHOS 107   AST 30   ALT 26   ANIONGAP 14   EGFRNONAA 39.2*     Urine Culture: No results for input(s): LABURIN in the last 48 hours.  Urine Studies:   Recent Labs   Lab 09/06/20  0954   COLORU Yellow   APPEARANCEUA Hazy*   PHUR 8.0   SPECGRAV 1.020   PROTEINUA 1+*   GLUCUA Negative   KETONESU Negative   BILIRUBINUA Negative   OCCULTUA Trace*   NITRITE Negative   UROBILINOGEN Negative   LEUKOCYTESUR 3+*   RBCUA 10*    WBCUA 60*   BACTERIA Moderate*   SQUAMEPITHEL 1   HYALINECASTS 2*     All pertinent labs within the past 24 hours have been reviewed.    Significant Imaging: I have reviewed all pertinent imaging results/findings within the past 24 hours.

## 2020-09-07 NOTE — HPI
From H&P by Brianne WATTS:  Ms. Gloria Zuleta is a 89 y.o. female, with PMH of CAD, HFrEF, HTN, NICM, chronic sacral osteomyelitis, Stage IV sacral decubitus ulcer, Urinary incontinence, Dementia, HLD, who was admitted to [facility] ED on 9/6/20 with epigastric abdominal pain, nausea and vomiting x 1 day. ED evaluation showed a UTI with Sepsis. A CT showed renal stones bilaterally, US showed gallstones with gallbladder thickening associated with chronic cholecystitis without bile duct dilation or signs of acute infection. The patient was started on IV antibiotics, and admitted to the hospitalist service. On her first day of admission, shortly after starting ot eat dinner, she had an episode of nausea and vomiting, during which she vomited black liquid. Given concern for UGIB, she was started on IV Protonix, and H&H reassessed. She did have a drop from 16.0 to 14.1, which was favored to be 2/2 IV fluid dilution at the second CBC is her baseline HGB. Given lack of GI coverage at [facility], she was transferred to [facility] where she will be admitted to inpatient status to the ICU.

## 2020-09-07 NOTE — ASSESSMENT & PLAN NOTE
- started on levaquin upon admission  - Urine cultures pending   - anaerobic blood culture grown gram negative rods

## 2020-09-07 NOTE — PLAN OF CARE
(Physician in Lead of Transfers)   Outside Transfer Acceptance Note / Regional Referral Center        Transferring Physician: Dr. Dunn    Accepting Physician: Tamra Uriarte MD    Date of Acceptance: 09/06/2020    Transferring Facility: [facility]     Reason for Transfer: GI consult     Report from Transferring Physician/Hospital course: The patient is a 89 y/o female with PMH of CHF, CAD, HTN, and HLP who presented with epigastric abdominal pain and non-bloody and non-bilious vomiting. CT was done as etiology of her pain was unclear and it showed kidney stones and gallstones and UA suggests UTI. IT was thought that perhaps that some of these may be the source of her pain but then she started to vomit again and it was about 2-3 cups of black liquid which suggests blood. Initial Hb 16 which appears to be hemoconcentrated. HRs in the 110s and BP 1116/76. She takes ASA daily. Currently on BID IV PPI. Repeat CBC pending. GI at [facility] aware of case. Will determine need for floor or ICU pending repeat CBC results.         Labs & Radiographs: see EPIC      To Do List:   1) Telemetry  2) Follow up repeat H&H  3) NPO  4) Continue PPI  5) GI consult       Tamra Uriarte MD  Hospital Medicine Staff

## 2020-09-07 NOTE — CONSULTS
Gastroenterology Consult    9/7/2020 10:09 AM    Patient Name: Gloria Zuleta  MRN: [id number]  Admission Date: 9/7/2020  Hospital Length of Stay: 0 days  Code Status: DNR   Primary Care Physician: Geoffrey Harrison MD  Principal Problem:GI bleed  Consulting Physician: Inpatient consult to Gastroenterology  Consult performed by: Olesya Hill MD  Consult ordered by: Brianne Lehman PA-C        Reason for consultation: GI bleed    HPI:  Gloria Zuleta is a 89 y.o. female with a history of CAD, CHF (EF 25%), sacral decubitus ulcer w/ osteomyelitis, right breast cancer, and lymphoma who presented as a transfer due to concern for GI bleed with some dark brown emesis.  She was originally admitted to an outside hospital with abdominal pain, N/V, decreased appetite and CT showed kidney stones, gallstones, and chronic cholecystitis.  UA was consistent w/ UTI, so symptoms were attributed to that.  She has had an EGD and colonoscopy in the past per past surgical history, but when I asked patient about this, she denied having either.  She reports that N/V is better, but she is still having generalized abdominal pain without radiation.  Nurse reports no vomiting or overt GI blood loss since admission to ICU.    Past Medical History:   Diagnosis Date    Breast CA     right    CAD (coronary artery disease)     CHF (congestive heart failure)     Hypertension     Labial abscess     Lymphoma     creceived chemo treatment    Osteoarthritis        Past Surgical History:   Procedure Laterality Date    ABSCESS DRAINAGE      labia    COLONOSCOPY      dialtion      urethral dialtion    ESOPHAGOGASTRODUODENOSCOPY      HYSTERECTOMY      total abdominal    ALEXANDRU    NECK EXPLORATION      exploratory lap & Bx of  RIGHT neck    UPPER GASTROINTESTINAL ENDOSCOPY         Social History     Tobacco Use    Smoking status: Never Smoker    Smokeless tobacco: Never Used   Substance Use Topics    Alcohol use: No    Drug use: No         Family History   Problem Relation Age of Onset    No Known Problems Mother     No Known Problems Father          Review of patient's allergies indicates:   Allergen Reactions    Bactrim [sulfamethoxazole-trimethoprim]     Codeine     Cortisone     Keflex [cephalexin]     Pcn [penicillins]     Sulfadiazine     Sumycin 250     Contrast media Other (See Comments)     unknown    Donnatal [phenobarb-hyoscy-atropine-scop] Other (See Comments)     Unknown     Restoril [temazepam] Other (See Comments)     unknown         Current Facility-Administered Medications:     0.9%  NaCl infusion, , Intravenous, Continuous, Brianne Lehman PA-C, Last Rate: 50 mL/hr at 09/07/20 0135    acetaminophen tablet 650 mg, 650 mg, Oral, Q4H PRN, Brianne Lehman PA-C    atorvastatin tablet 40 mg, 40 mg, Oral, Daily, Brianne Lehman PA-C, 40 mg at 09/07/20 0854    carvediloL tablet 3.125 mg, 3.125 mg, Oral, BID, Tessa Puentes MD, 3.125 mg at 09/07/20 0945    dextrose 50% injection 12.5 g, 12.5 g, Intravenous, PRN, Brianne Lehman PA-C    dextrose 50% injection 25 g, 25 g, Intravenous, PRN, Brianne Lehman PA-C    glucagon (human recombinant) injection 1 mg, 1 mg, Intramuscular, PRN, Brianne Lehman PA-C    glucose chewable tablet 16 g, 16 g, Oral, PRN, Brianne Lehman PA-C    glucose chewable tablet 24 g, 24 g, Oral, PRN, Brianne Lehman PA-C    levoFLOXacin 250 mg/50 mL IVPB 250 mg, 250 mg, Intravenous, Q24H, Brianne Lehman PA-C    ondansetron injection 4 mg, 4 mg, Intravenous, Q8H PRN, Brianne Lehman PA-C    pantoprazole 40 mg in dextrose 5 % 100 mL infusion (ready to mix system), 8 mg/hr, Intravenous, Continuous, Brianne Lehman PA-C, Last Rate: 20 mL/hr at 09/07/20 0644, 8 mg/hr at 09/07/20 0644    sodium chloride 0.9% flush 10 mL, 10 mL, Intravenous, PRN, Brianne Lehman PA-C    tamsulosin 24 hr capsule 0.4 mg, 0.4 mg, Oral, Daily, Brianne RUSSELL  "DUY Lehman, 0.4 mg at 09/07/20 0854    Review of Systems   Constitutional: Negative for chills, fever and weight loss.   HENT: Negative.    Eyes: Negative.    Respiratory: Negative.    Cardiovascular: Negative.    Gastrointestinal: Positive for abdominal pain, nausea and vomiting. Negative for blood in stool, constipation, diarrhea, heartburn and melena.   Genitourinary: Negative.    Musculoskeletal: Negative.    Skin: Positive for rash (sacral ulcer).   Neurological: Negative.    Endo/Heme/Allergies: Negative.        /63   Pulse 102   Temp 96 °F (35.6 °C) (Oral) Comment: refuses blankets  Resp 17   Ht 4' 11" (1.499 m)   Wt 50.9 kg (112 lb 3.4 oz)   SpO2 96%   BMI 22.66 kg/m²   Physical Exam  Constitutional:       General: She is not in acute distress.     Appearance: Normal appearance.   HENT:      Head: Normocephalic and atraumatic.      Right Ear: External ear normal.      Left Ear: External ear normal.      Ears:      Comments: Hard of hearing     Nose: Nose normal.      Mouth/Throat:      Mouth: Mucous membranes are moist.      Pharynx: Oropharynx is clear.   Eyes:      General: No scleral icterus.     Extraocular Movements: Extraocular movements intact.      Conjunctiva/sclera: Conjunctivae normal.   Cardiovascular:      Rate and Rhythm: Regular rhythm. Tachycardia present.      Heart sounds: Normal heart sounds. No murmur.   Pulmonary:      Effort: Pulmonary effort is normal. No respiratory distress.      Breath sounds: Normal breath sounds. No wheezing.   Abdominal:      General: Bowel sounds are normal. There is no distension.      Palpations: Abdomen is soft.      Tenderness: There is abdominal tenderness (throughout). There is no guarding or rebound.   Musculoskeletal:         General: Deformity (arthritic changes in both hands) present.      Right lower leg: No edema.      Left lower leg: No edema.   Skin:     General: Skin is warm and dry.      Findings: No rash.   Neurological:      " Mental Status: She is alert and oriented to person, place, and time. Mental status is at baseline.      Sensory: No sensory deficit.          Labs:  Lab Results   Component Value Date/Time    WBC 13.85 (H) 09/07/2020 04:00 AM    HGB 14.1 09/07/2020 04:00 AM    HCT 41.7 09/07/2020 04:00 AM     09/07/2020 04:00 AM    MCV 90 09/07/2020 04:00 AM     09/07/2020 04:00 AM    K 3.9 09/07/2020 04:00 AM     09/07/2020 04:00 AM    CO2 23 09/07/2020 04:00 AM    BUN 55 (H) 09/07/2020 04:00 AM    CREATININE 1.4 09/07/2020 04:00 AM    GLU 96 09/07/2020 04:00 AM    CALCIUM 9.6 09/07/2020 04:00 AM    MG 2.7 (H) 12/03/2009 05:15 AM    INR 1.2 11/21/2016 06:28 AM    APTT 26.5 11/21/2016 06:28 AM   ]  Lab Results   Component Value Date/Time    PROT 5.5 (L) 09/07/2020 04:00 AM    ALBUMIN 2.7 (L) 09/07/2020 04:00 AM    BILITOT 0.8 09/07/2020 04:00 AM    AST 13 09/07/2020 04:00 AM    ALT 6 (L) 09/07/2020 04:00 AM    ALKPHOS 71 09/07/2020 04:00 AM   ]    Imaging and Procedures:  I personally reviewed the imaging/procedures below.  RUQ U/S 9/6/20:  Cholelithiasis without evidence of cholecystitis.  Suggest obtaining a nuclear medicine hepatobiliary scan to further investigate.    Per my review of CT - calcified gallstones in GB, fluid filled distended stomach and large stool burden    Assessment:  Gloria Zuleta is a 89 y.o. female with a history of CAD, CHF (EF 25%), sacral decubitus ulcer w/ osteomyelitis, right breast cancer, and lymphoma who presented as a transfer due to concern for GI bleed.  She is hemodynamically stable.  H/H went from 16 to 14, but likely hemoconcentrated on initial assessment.    Plan:  - volume resuscitation  - PPI gtt  - serial H/H  - monitor for overt bleeding  - NPO after MN for possible EGD Tuesday if she has a further drop in H/H or overt GI bleeding  - needs bowel regimen - will start miralax 17 g BID and colace 100 daily    Olesya Hill MD

## 2020-09-07 NOTE — ASSESSMENT & PLAN NOTE
- suspected 2/2 UTI   - on Levaquin  - somewhat tachycardic with mild hypotension   - continue gently IV fluids   - last lactic acid not elevated  - blood cultures ordered    - anaerobic bottle with gram negative rods   - urine cultures pending

## 2020-09-07 NOTE — ASSESSMENT & PLAN NOTE
- continue statin  - last lipid panel:  Results for ETHAN BEY (MRN [id number]) as of 9/7/2020 01:32   Ref. Range 1/27/2019 05:34   Cholesterol Latest Ref Range: 120 - 199 mg/dL 128   HDL Latest Ref Range: 40 - 75 mg/dL 43   Hdl/Cholesterol Ratio Latest Ref Range: 20.0 - 50.0 % 33.6   LDL Cholesterol External Latest Ref Range: 63.0 - 159.0 mg/dL 69.8   Non-HDL Cholesterol Latest Units: mg/dL 85   Total Cholesterol/HDL Ratio Latest Ref Range: 2.0 - 5.0  3.0   Triglycerides Latest Ref Range: 30 - 150 mg/dL 76

## 2020-09-07 NOTE — PLAN OF CARE
Problem: Adult Inpatient Plan of Care  Goal: Plan of Care Review  Outcome: Ongoing, Progressing  Goal: Patient-Specific Goal (Individualization)  Outcome: Ongoing, Progressing  Goal: Absence of Hospital-Acquired Illness or Injury  Outcome: Ongoing, Progressing  Goal: Optimal Comfort and Wellbeing  Outcome: Ongoing, Progressing  Goal: Readiness for Transition of Care  Outcome: Ongoing, Progressing     Problem: Adjustment to Illness (Sepsis/Septic Shock)  Goal: Optimal Coping  Outcome: Ongoing, Progressing     Problem: Bleeding (Sepsis/Septic Shock)  Goal: Absence of Bleeding  Outcome: Ongoing, Progressing     Problem: Hemodynamic Instability (Sepsis/Septic Shock)  Goal: Effective Tissue Perfusion  Outcome: Ongoing, Progressing     Problem: Infection (Sepsis/Septic Shock)  Goal: Absence of Infection Signs/Symptoms  Outcome: Ongoing, Progressing     Problem: Respiratory Compromise (Sepsis/Septic Shock)  Goal: Effective Oxygenation and Ventilation  Outcome: Ongoing, Progressing     Problem: Electrolyte Imbalance (Acute Kidney Injury/Impairment)  Goal: Serum Electrolyte Balance  Outcome: Ongoing, Progressing     Problem: Wound  Goal: Optimal Wound Healing  Outcome: Met     Problem: Fall Injury Risk  Goal: Absence of Fall and Fall-Related Injury  Outcome: Ongoing, Progressing     Problem: Skin Injury Risk Increased  Goal: Skin Health and Integrity  Outcome: Ongoing, Progressing     Problem: Bleeding (Gastrointestinal Bleeding)  Goal: Hemostasis  Outcome: Ongoing, Progressing

## 2020-09-08 PROBLEM — R78.81 GRAM-NEGATIVE BACTEREMIA: Status: ACTIVE | Noted: 2020-09-08

## 2020-09-08 PROBLEM — A41.50 SEPSIS DUE TO GRAM-NEGATIVE UTI: Status: ACTIVE | Noted: 2020-09-06

## 2020-09-08 PROBLEM — N39.0 URINARY TRACT INFECTION DUE TO PROTEUS: Status: ACTIVE | Noted: 2020-09-08

## 2020-09-08 PROBLEM — N39.0 SEPSIS DUE TO GRAM-NEGATIVE UTI: Status: ACTIVE | Noted: 2020-09-06

## 2020-09-08 PROBLEM — K92.0 HEMATEMESIS WITH NAUSEA: Status: ACTIVE | Noted: 2020-09-07

## 2020-09-08 PROBLEM — F03.90 DEMENTIA WITHOUT BEHAVIORAL DISTURBANCE: Status: ACTIVE | Noted: 2017-02-22

## 2020-09-08 PROBLEM — B96.4 URINARY TRACT INFECTION DUE TO PROTEUS: Status: ACTIVE | Noted: 2020-09-08

## 2020-09-08 PROBLEM — N17.9 AKI (ACUTE KIDNEY INJURY): Status: RESOLVED | Noted: 2017-02-22 | Resolved: 2020-09-08

## 2020-09-08 LAB
ALBUMIN SERPL BCP-MCNC: 2.6 G/DL (ref 3.5–5.2)
ALP SERPL-CCNC: 80 U/L (ref 55–135)
ALT SERPL W/O P-5'-P-CCNC: 11 U/L (ref 10–44)
ANION GAP SERPL CALC-SCNC: 10 MMOL/L (ref 8–16)
ANION GAP SERPL CALC-SCNC: 10 MMOL/L (ref 8–16)
AST SERPL-CCNC: 19 U/L (ref 10–40)
BASOPHILS # BLD AUTO: 0.01 K/UL (ref 0–0.2)
BASOPHILS NFR BLD: 0.1 % (ref 0–1.9)
BILIRUB SERPL-MCNC: 0.6 MG/DL (ref 0.1–1)
BUN SERPL-MCNC: 27 MG/DL (ref 10–30)
BUN SERPL-MCNC: 27 MG/DL (ref 10–30)
CALCIUM SERPL-MCNC: 10 MG/DL (ref 8.7–10.5)
CALCIUM SERPL-MCNC: 10 MG/DL (ref 8.7–10.5)
CHLORIDE SERPL-SCNC: 106 MMOL/L (ref 95–110)
CHLORIDE SERPL-SCNC: 106 MMOL/L (ref 95–110)
CO2 SERPL-SCNC: 22 MMOL/L (ref 23–29)
CO2 SERPL-SCNC: 22 MMOL/L (ref 23–29)
CREAT SERPL-MCNC: 0.8 MG/DL (ref 0.5–1.4)
CREAT SERPL-MCNC: 0.8 MG/DL (ref 0.5–1.4)
DIFFERENTIAL METHOD: ABNORMAL
EOSINOPHIL # BLD AUTO: 0 K/UL (ref 0–0.5)
EOSINOPHIL NFR BLD: 0 % (ref 0–8)
ERYTHROCYTE [DISTWIDTH] IN BLOOD BY AUTOMATED COUNT: 15.1 % (ref 11.5–14.5)
EST. GFR  (AFRICAN AMERICAN): >60 ML/MIN/1.73 M^2
EST. GFR  (AFRICAN AMERICAN): >60 ML/MIN/1.73 M^2
EST. GFR  (NON AFRICAN AMERICAN): >60 ML/MIN/1.73 M^2
EST. GFR  (NON AFRICAN AMERICAN): >60 ML/MIN/1.73 M^2
GLUCOSE SERPL-MCNC: 91 MG/DL (ref 70–110)
GLUCOSE SERPL-MCNC: 91 MG/DL (ref 70–110)
HCT VFR BLD AUTO: 38.5 % (ref 37–48.5)
HGB BLD-MCNC: 12.9 G/DL (ref 12–16)
IMM GRANULOCYTES # BLD AUTO: 0.01 K/UL (ref 0–0.04)
IMM GRANULOCYTES NFR BLD AUTO: 0.1 % (ref 0–0.5)
LYMPHOCYTES # BLD AUTO: 0.5 K/UL (ref 1–4.8)
LYMPHOCYTES NFR BLD: 5.7 % (ref 18–48)
MAGNESIUM SERPL-MCNC: 1.9 MG/DL (ref 1.6–2.6)
MCH RBC QN AUTO: 29.5 PG (ref 27–31)
MCHC RBC AUTO-ENTMCNC: 33.5 G/DL (ref 32–36)
MCV RBC AUTO: 88 FL (ref 82–98)
MONOCYTES # BLD AUTO: 0.4 K/UL (ref 0.3–1)
MONOCYTES NFR BLD: 5.5 % (ref 4–15)
NEUTROPHILS # BLD AUTO: 7.1 K/UL (ref 1.8–7.7)
NEUTROPHILS NFR BLD: 88.6 % (ref 38–73)
NRBC BLD-RTO: 0 /100 WBC
PHOSPHATE SERPL-MCNC: 2 MG/DL (ref 2.7–4.5)
PLATELET # BLD AUTO: 101 K/UL (ref 150–350)
PMV BLD AUTO: 9.8 FL (ref 9.2–12.9)
POTASSIUM SERPL-SCNC: 3.5 MMOL/L (ref 3.5–5.1)
POTASSIUM SERPL-SCNC: 3.5 MMOL/L (ref 3.5–5.1)
PROT SERPL-MCNC: 5.7 G/DL (ref 6–8.4)
RBC # BLD AUTO: 4.37 M/UL (ref 4–5.4)
SODIUM SERPL-SCNC: 138 MMOL/L (ref 136–145)
SODIUM SERPL-SCNC: 138 MMOL/L (ref 136–145)
WBC # BLD AUTO: 8.06 K/UL (ref 3.9–12.7)

## 2020-09-08 PROCEDURE — 25000003 PHARM REV CODE 250: Performed by: INTERNAL MEDICINE

## 2020-09-08 PROCEDURE — 63600175 PHARM REV CODE 636 W HCPCS: Performed by: HOSPITALIST

## 2020-09-08 PROCEDURE — 99900035 HC TECH TIME PER 15 MIN (STAT)

## 2020-09-08 PROCEDURE — 25000003 PHARM REV CODE 250: Performed by: PHYSICIAN ASSISTANT

## 2020-09-08 PROCEDURE — 36415 COLL VENOUS BLD VENIPUNCTURE: CPT

## 2020-09-08 PROCEDURE — 84100 ASSAY OF PHOSPHORUS: CPT

## 2020-09-08 PROCEDURE — 83735 ASSAY OF MAGNESIUM: CPT

## 2020-09-08 PROCEDURE — 11000001 HC ACUTE MED/SURG PRIVATE ROOM

## 2020-09-08 PROCEDURE — C9113 INJ PANTOPRAZOLE SODIUM, VIA: HCPCS | Performed by: INTERNAL MEDICINE

## 2020-09-08 PROCEDURE — 85025 COMPLETE CBC W/AUTO DIFF WBC: CPT

## 2020-09-08 PROCEDURE — 94761 N-INVAS EAR/PLS OXIMETRY MLT: CPT

## 2020-09-08 PROCEDURE — 63600175 PHARM REV CODE 636 W HCPCS: Performed by: INTERNAL MEDICINE

## 2020-09-08 PROCEDURE — 80053 COMPREHEN METABOLIC PANEL: CPT

## 2020-09-08 PROCEDURE — 99233 PR SUBSEQUENT HOSPITAL CARE,LEVL III: ICD-10-PCS | Mod: ,,, | Performed by: HOSPITALIST

## 2020-09-08 PROCEDURE — 99233 SBSQ HOSP IP/OBS HIGH 50: CPT | Mod: ,,, | Performed by: HOSPITALIST

## 2020-09-08 PROCEDURE — 27000221 HC OXYGEN, UP TO 24 HOURS

## 2020-09-08 RX ORDER — TALC
6 POWDER (GRAM) TOPICAL NIGHTLY PRN
Status: DISCONTINUED | OUTPATIENT
Start: 2020-09-08 | End: 2020-09-11 | Stop reason: HOSPADM

## 2020-09-08 RX ORDER — PANTOPRAZOLE SODIUM 40 MG/10ML
40 INJECTION, POWDER, LYOPHILIZED, FOR SOLUTION INTRAVENOUS 2 TIMES DAILY
Status: DISCONTINUED | OUTPATIENT
Start: 2020-09-08 | End: 2020-09-11

## 2020-09-08 RX ADMIN — CARVEDILOL 3.12 MG: 3.12 TABLET, FILM COATED ORAL at 08:09

## 2020-09-08 RX ADMIN — POLYETHYLENE GLYCOL 3350 17 G: 17 POWDER, FOR SOLUTION ORAL at 08:09

## 2020-09-08 RX ADMIN — PANTOPRAZOLE SODIUM 40 MG: 40 INJECTION, POWDER, LYOPHILIZED, FOR SOLUTION INTRAVENOUS at 08:09

## 2020-09-08 RX ADMIN — DOCUSATE SODIUM 100 MG: 100 CAPSULE, LIQUID FILLED ORAL at 08:09

## 2020-09-08 RX ADMIN — DEXTROSE 8 MG/HR: 50 INJECTION, SOLUTION INTRAVENOUS at 08:09

## 2020-09-08 RX ADMIN — CEFTRIAXONE 1 G: 1 INJECTION, SOLUTION INTRAVENOUS at 03:09

## 2020-09-08 RX ADMIN — ATORVASTATIN CALCIUM 40 MG: 20 TABLET, FILM COATED ORAL at 08:09

## 2020-09-08 RX ADMIN — TAMSULOSIN HYDROCHLORIDE 0.4 MG: 0.4 CAPSULE ORAL at 08:09

## 2020-09-08 RX ADMIN — DEXTROSE 8 MG/HR: 50 INJECTION, SOLUTION INTRAVENOUS at 04:09

## 2020-09-08 RX ADMIN — Medication 6 MG: at 09:09

## 2020-09-08 NOTE — ASSESSMENT & PLAN NOTE
- noted upon ED imaging   - no signs of acute infection or obstruction, negative sonographic Peña sign.  - monitor

## 2020-09-08 NOTE — PROGRESS NOTES
[facility] Hospital Medicine  Progress Note    Patient Name: Gloria Zuleta  MRN: [id number]  Patient Class: IP- Inpatient   Admission Date: 9/7/2020  Length of Stay: 1 days  Attending Physician: Rachel Valdez MD  Primary Care Provider: [facility]        Subjective:     Principal Problem:Sepsis due to gram-negative UTI        HPI:  From H&P by Brianne Lehman PA:  Ms. Gloria Zuleta is a 89 y.o. female, with PMH of CAD, HFrEF, HTN, NICM, chronic sacral osteomyelitis, Stage IV sacral decubitus ulcer, Urinary incontinence, Dementia, HLD, who was admitted to [facility] ED on 9/6/20 with epigastric abdominal pain, nausea and vomiting x 1 day. ED evaluation showed a UTI with Sepsis. A CT showed renal stones bilaterally, US showed gallstones with gallbladder thickening associated with chronic cholecystitis without bile duct dilation or signs of acute infection. The patient was started on IV antibiotics, and admitted to the hospitalist service. On her first day of admission, shortly after starting ot eat dinner, she had an episode of nausea and vomiting, during which she vomited black liquid. Given concern for UGIB, she was started on IV Protonix, and H&H reassessed. She did have a drop from 16.0 to 14.1, which was favored to be 2/2 IV fluid dilution at the second CBC is her baseline HGB. Given lack of GI coverage at [facility], she was transferred to [facility] where she will be admitted to inpatient status to the ICU.     Overview/Hospital Course:  Patient was admitted to the ICU from [facility] where she was being treated for a UTI.  She had no further episodes of vomiting and expressed to the GI physician that she was hungry, so her diet was advanced and her H/H monitored closely rather than putting her through a procedure.  Noted after admission that she also had a prolapsed rectum that appeared quite irritated.  This has been evaluated by CRS in the past and is reducible.  Her urine culture grew  Proteus, and on 9/8 she also had a positive blood culture for Proteus.  She was continued on ceftriaxone to treat.  Transferred to the floor on 9/7.    Interval History:  Patient was hungry this morning and has had no vomiting since she presented here so GI is holding off on an EGD for now.  In the meantime she has Proteus bacteremia in addition to UTI.  She ate and tolerated breakfast, has no complaints.    Review of Systems   Constitutional: Negative for chills and fever.   Respiratory: Negative for cough and shortness of breath.    Cardiovascular: Negative for chest pain and palpitations.     Objective:     Vital Signs (Most Recent):  Temp: 98.6 °F (37 °C) (09/08/20 1604)  Pulse: 99 (09/08/20 1604)  Resp: 16 (09/08/20 1604)  BP: 115/65 (09/08/20 1604)  SpO2: (!) 94 % (09/08/20 1604) Vital Signs (24h Range):  Temp:  [97.2 °F (36.2 °C)-98.6 °F (37 °C)] 98.6 °F (37 °C)  Pulse:  [] 99  Resp:  [16-20] 16  SpO2:  [94 %-99 %] 94 %  BP: (115-150)/(63-73) 115/65     Weight: 49.1 kg (108 lb 3.9 oz)  Body mass index is 21.86 kg/m².    Intake/Output Summary (Last 24 hours) at 9/8/2020 1822  Last data filed at 9/8/2020 1735  Gross per 24 hour   Intake 1140 ml   Output --   Net 1140 ml      Physical Exam  Constitutional:       Appearance: She is well-developed.      Comments: Frail, elderly woman in NAD, hard of hearing.   HENT:      Head: Normocephalic.   Eyes:      Conjunctiva/sclera: Conjunctivae normal.      Pupils: Pupils are equal, round, and reactive to light.   Neck:      Musculoskeletal: Neck supple.      Thyroid: No thyromegaly.   Cardiovascular:      Rate and Rhythm: Normal rate and regular rhythm.      Heart sounds: Normal heart sounds. No murmur. No friction rub. No gallop.    Pulmonary:      Effort: Pulmonary effort is normal.      Breath sounds: Normal breath sounds.   Abdominal:      General: Bowel sounds are normal. There is no distension.      Palpations: Abdomen is soft.      Tenderness: There is no  "abdominal tenderness.   Musculoskeletal: Normal range of motion.   Lymphadenopathy:      Cervical: No cervical adenopathy.   Skin:     General: Skin is warm and dry.      Findings: No rash.   Neurological:      Mental Status: She is alert.      Comments: Not oriented, believes she is at home.   Psychiatric:         Behavior: Behavior normal.         Thought Content: Thought content normal.         Significant Labs: All pertinent labs within the past 24 hours have been reviewed.    Significant Imaging: I have reviewed all pertinent imaging results/findings within the past 24 hours.      Assessment/Plan:      * Sepsis due to gram-negative UTI  - SIRS criteria for sepsis include hypotension, tachycardia, leukocytosis.  Documented UTI/bacteremia with Proteus.  - Discontinue IV fluids as she is tolerating oral fluids and BP stable.      Hematemesis with nausea  - Patient admitted to [facility] with UTI, transferred to [facility] 2/2 suspected upper GI bleed with "black vomiting" after dinner at the other hospital.  - H&H did drop two points to 14.1 from 16.0 at time of admission, although Hb 14 is her baseline, so this was felt be more likely represent hemodilution as she has been receiving IV fluids   - GI bleed order set initiated   - GI consulted, feel she does not have acute GI bleeding and will monitor for now, especially given bacteremia and UTI.  - PPI changed to oral, patient tolerating diet.    Urinary tract infection due to Proteus  - Resistant to Levaquin, discontinued  - Started ceftriaxone after discussing with son.  Patient's chart states she is allergic to PCN and cephalexin, but he reports she has never had a reaction that he is aware of.  - Blood culture also growing Proteus.      Gram-negative bacteremia  - Proteus UTI and bacteremia  - Continue ceftriaxone.      Calculus of gallbladder with chronic cholecystitis without obstruction  - noted upon ED imaging   - no signs of acute infection or obstruction, negative " sonographic Peña sign.  - monitor       Chronic combined systolic and diastolic heart failure  - last Echo with EF 25%   - appears compensated at present   - monitor I&O and daily weights   - Hold Lasix, lisinopril for now, continue carvedilol.    Decubitus ulcer of sacral region, stage 4  - wound care consulted, note this is nearly healed  - blanching erythema at sacrum noted at present       Dementia without behavioral disturbance  - delirium precautions ordered   - Likely Alzheimer's type dementia.  She is pleasant and cooperative, no evidence of behavioral issues.    Hyperlipemia  - continue statin  - last lipid panel:  Results for ETHAN BEY (MRN [id number]) as of 9/7/2020 01:32   Ref. Range 1/27/2019 05:34   Cholesterol Latest Ref Range: 120 - 199 mg/dL 128   HDL Latest Ref Range: 40 - 75 mg/dL 43   Hdl/Cholesterol Ratio Latest Ref Range: 20.0 - 50.0 % 33.6   LDL Cholesterol External Latest Ref Range: 63.0 - 159.0 mg/dL 69.8   Non-HDL Cholesterol Latest Units: mg/dL 85   Total Cholesterol/HDL Ratio Latest Ref Range: 2.0 - 5.0  3.0   Triglycerides Latest Ref Range: 30 - 150 mg/dL 76         Essential hypertension  - hypotensive upon admission  - Held lisinopril, Lasix, continued carvedilol 3.125 mg bid.  - monitor blood pressure       CAD in native artery  - continuing statin  - holding ASA in light of UGIB         VTE Risk Mitigation (From admission, onward)         Ordered     IP VTE HIGH RISK PATIENT  Once      09/07/20 0052     Place sequential compression device  Until discontinued      09/07/20 0052     Reason for No Pharmacological VTE Prophylaxis  Once     Question:  Reasons:  Answer:  Active Bleeding    09/07/20 0052     Place MINE hose  Until discontinued      09/07/20 0030                Discharge Planning   ALFRED: 9/9/2020     Code Status: DNR   Is the patient medically ready for discharge?:     Reason for patient still in hospital (select all that apply): Patient trending condition and  Treatment  Discharge Plan A: Return to nursing home                  Rachel Hagan MD  Department of Hospital Medicine   [facility]

## 2020-09-08 NOTE — PLAN OF CARE
Initial Discharge Planning Assessment:  Patient admitted on 9-7-20  Chart reviewed, Care plan discussed with treatment team,  attending Dr Valdez   PCP updated in Epic: at Nursing Home   Pharmacy, updated in Epic: at Nursing Home   DME at home: at Nursing Home  Current dispo: pending  GI and wound care following   Case management to follow       09/08/20 1331   Discharge Assessment   Assessment Type Discharge Planning Assessment   Confirmed/corrected address and phone number on facesheet? Yes   Assessment information obtained from? Patient;Caregiver;Medical Record   Communicated expected length of stay with patient/caregiver yes   Prior to hospitilization cognitive status: Not Oriented to Time   Prior to hospitalization functional status: Needs Assistance   Current cognitive status: Not Oriented to Time   Current Functional Status: Needs Assistance   Lives With facility resident   Able to Return to Prior Arrangements yes   Is patient able to care for self after discharge? No   Patient currently being followed by outpatient case management? No   Patient currently receives any other outside agency services? No   Equipment Currently Used at Home other (see comments)  (at [facility])   Do you have any problems affording any of your prescribed medications? No   Is the patient taking medications as prescribed? yes   Does the patient have transportation home? Yes   Transportation Anticipated health plan transportation   Discharge Plan A Return to nursing home   DME Needed Upon Discharge  none   Patient/Family in Agreement with Plan yes   Readmission Questionnaire   At the time of your discharge, did someone talk to you about what your health problems were? Yes   At the time of discharge, did someone talk to you about what to watch out for regarding worsening of your health problem? Yes   At the time of discharge, did someone talk to you about what to do if you experienced worsening of your health  problem? Yes   At the time of discharge, did someone talk to you about which medication to take when you left the hospital and which ones to stop taking? Yes   At the time of discharge, did someone talk to you about when and where to follow up with a doctor after you left the hospital? Yes

## 2020-09-08 NOTE — ASSESSMENT & PLAN NOTE
- last Echo with EF 25%   - appears compensated at present   - monitor I&O and daily weights   - Hold Lasix, lisinopril for now, continue carvedilol.

## 2020-09-08 NOTE — ASSESSMENT & PLAN NOTE
- SIRS criteria for sepsis include hypotension, tachycardia, leukocytosis.  Documented UTI/bacteremia with Proteus.  - Discontinue IV fluids as she is tolerating oral fluids and BP stable.     Alternatives Discussed Intro (Do Not Add Period): I discussed alternative treatments to Mohs surgery and specifically discussed the risks and benefits of

## 2020-09-08 NOTE — HOSPITAL COURSE
Patient was admitted to the ICU from [facility] where she was being treated for a UTI.  She had no further episodes of vomiting and expressed to the GI physician that she was hungry, so her diet was advanced and her H/H monitored closely rather than putting her through a procedure.  H/H remained stable and she had no further active bleeding.  Noted after admission that she also had a prolapsed rectum that appeared quite irritated.  This has been evaluated by CRS in the past and is reducible.  Her urine culture grew Proteus, and on 9/8 she also had a positive blood culture for Proteus.  She was continued on ceftriaxone to treat.  Transferred to the floor on 9/7.  ID evaluated her and recommended continuing IV ceftriaxone to continue a 10 day course.  Blood culture was repeated 9/10 to ensure clearance of bacteremia and when it was negative a midline was placed to complete her course of antibiotics in SNF prior to returning to skilled nursing care.  She was feeling well on discharge.

## 2020-09-08 NOTE — ASSESSMENT & PLAN NOTE
- delirium precautions ordered   - Likely Alzheimer's type dementia.  She is pleasant and cooperative, no evidence of behavioral issues.

## 2020-09-08 NOTE — PLAN OF CARE
09/08/20 1330   Readmission Questionnaire   At the time of your discharge, did someone talk to you about what your health problems were? Yes   At the time of discharge, did someone talk to you about what to watch out for regarding worsening of your health problem? Yes   At the time of discharge, did someone talk to you about what to do if you experienced worsening of your health problem? Yes   At the time of discharge, did someone talk to you about which medication to take when you left the hospital and which ones to stop taking? Yes   At the time of discharge, did someone talk to you about when and where to follow up with a doctor after you left the hospital? Yes

## 2020-09-08 NOTE — CONSULTS
Wound Care consult for wound to sacrum. Assisted by LAMINE Ferraro. Patient was admitted for a possilbe GI bleed. 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 Patient was lying in bed fanning herself, responded when spoken to, and able to ask for her needs.    Sacrum - This is a healing Stage 4 pressure Injury (gleaned form patient history), that is almost closed. There was a wound that measured 0.4 cm X 0.4 cm X 0.2 cm (unable to probe as very little depth), no drainage, no odor, red base, aviva-wound area with pink to hyperpigmented coloring, intact. Cleaned with warm wipe, patted dry and covered with Triad hydrophilic paste, which will provide moisture for a healing environment, debride any slough, and is a barrier against moisture. Covered with a silicone bordered foam dressing for protection. Patient is incontinent of urine.     Patient has a prolapsed rectum that is moist and red. Patient did not complain about any pain.    An LDA had been written for a blister to the patient's left posterior calf. I was unable to see a blister. Patient did have Tubi- on bilateral lower legs, that I removed for inspection of the blister. They were rolled at the top, patient did not have edema to legs, so removed them. Patient had SCDs on. Elevated bilateral legs on pillows.     Recommend: Healing Stage 4 Pressure Injury - After perineal care, pat dry, apply a thick coat of Triad paste and cover with a silicone bordered foam dressing. Change every two days, or sooner if becomes soiled.    Jenifer Jett RN, CWOCN    Healing Stage 4 Pressure Injury to sacral spine      Prolapsed rectum.

## 2020-09-08 NOTE — ASSESSMENT & PLAN NOTE
- wound care consulted, note this is nearly healed  - blanching erythema at sacrum noted at present

## 2020-09-08 NOTE — ASSESSMENT & PLAN NOTE
"- Patient admitted to [facility] with UTI, [place] [facility] [place] suspected upper GI bleed with "black vomiting" after dinner at the other hospital.  - H&H did drop two points to 14.1 from 16.0 at time of admission, although Hb 14 is her baseline, so this was felt be more likely represent hemodilution as she has been receiving IV fluids   - GI bleed order set initiated   - GI consulted, feel she does not have acute GI bleeding and will monitor for now, especially given bacteremia and UTI.  - PPI changed to oral, patient tolerating diet.  "

## 2020-09-08 NOTE — SUBJECTIVE & OBJECTIVE
Interval History:  Patient was hungry this morning and has had no vomiting since she presented here so GI is holding off on an EGD for now.  In the meantime she has Proteus bacteremia in addition to UTI.  She ate and tolerated breakfast, has no complaints.    Review of Systems   Constitutional: Negative for chills and fever.   Respiratory: Negative for cough and shortness of breath.    Cardiovascular: Negative for chest pain and palpitations.     Objective:     Vital Signs (Most Recent):  Temp: 98.6 °F (37 °C) (09/08/20 1604)  Pulse: 99 (09/08/20 1604)  Resp: 16 (09/08/20 1604)  BP: 115/65 (09/08/20 1604)  SpO2: (!) 94 % (09/08/20 1604) Vital Signs (24h Range):  Temp:  [97.2 °F (36.2 °C)-98.6 °F (37 °C)] 98.6 °F (37 °C)  Pulse:  [] 99  Resp:  [16-20] 16  SpO2:  [94 %-99 %] 94 %  BP: (115-150)/(63-73) 115/65     Weight: 49.1 kg (108 lb 3.9 oz)  Body mass index is 21.86 kg/m².    Intake/Output Summary (Last 24 hours) at 9/8/2020 1822  Last data filed at 9/8/2020 1735  Gross per 24 hour   Intake 1140 ml   Output --   Net 1140 ml      Physical Exam  Constitutional:       Appearance: She is well-developed.      Comments: Frail, elderly woman in NAD, hard of hearing.   HENT:      Head: Normocephalic.   Eyes:      Conjunctiva/sclera: Conjunctivae normal.      Pupils: Pupils are equal, round, and reactive to light.   Neck:      Musculoskeletal: Neck supple.      Thyroid: No thyromegaly.   Cardiovascular:      Rate and Rhythm: Normal rate and regular rhythm.      Heart sounds: Normal heart sounds. No murmur. No friction rub. No gallop.    Pulmonary:      Effort: Pulmonary effort is normal.      Breath sounds: Normal breath sounds.   Abdominal:      General: Bowel sounds are normal. There is no distension.      Palpations: Abdomen is soft.      Tenderness: There is no abdominal tenderness.   Musculoskeletal: Normal range of motion.   Lymphadenopathy:      Cervical: No cervical adenopathy.   Skin:     General: Skin is  warm and dry.      Findings: No rash.   Neurological:      Mental Status: She is alert.      Comments: Not oriented, believes she is at home.   Psychiatric:         Behavior: Behavior normal.         Thought Content: Thought content normal.         Significant Labs: All pertinent labs within the past 24 hours have been reviewed.    Significant Imaging: I have reviewed all pertinent imaging results/findings within the past 24 hours.

## 2020-09-08 NOTE — PROGRESS NOTES
"Gastroenterology Progress Note    Reason for Consult: GI bleed    Subjective:  No issues overnight.  She is very hungry and wants to eat.  She denies having any bowel movements yesterday.  No vomiting.  She still has generalized abdominal pain.    ROS:  Gen: no F/C  CV: no CP/palpitations  Resp: no SOB/wheezing    Physical Exam  /70 (BP Location: Right arm, Patient Position: Lying)   Pulse 93   Temp 97.3 °F (36.3 °C) (Oral)   Resp 18   Ht 4' 11" (1.499 m)   Wt 49.1 kg (108 lb 3.9 oz)   SpO2 97%   BMI 21.86 kg/m²   General: Awake, alert female in no apparent distress  HEENT: NC/AT, PERRL, EOMI, MMM, hard of hearing  CV: RRR, without murmur, gallop, or rubs  Pulm: CTAB, no wheezing, rales, or rhonchi  GI: soft, TTP in epigastrium and lower abdomen, +BS  MSK: no edema and normal ROM, + arthritic changes in hands  Neuro: A&Ox3, moves all extremities equally, sensation grossly intact  Skin: no rashes or lesions  Psych: normal mood and affect    Medications/Infusions:  Current Facility-Administered Medications   Medication Dose Route Frequency Provider Last Rate Last Dose    acetaminophen tablet 650 mg  650 mg Oral Q4H PRN Tessa Puentes MD        atorvastatin tablet 40 mg  40 mg Oral Daily Tessa Puentes MD   40 mg at 09/08/20 0859    carvediloL tablet 3.125 mg  3.125 mg Oral BID Tessa Puentes MD   3.125 mg at 09/08/20 0859    dextrose 50% injection 12.5 g  12.5 g Intravenous PRN Tessa Puentes MD        dextrose 50% injection 25 g  25 g Intravenous PRN Tessa Puentes MD        docusate sodium capsule 100 mg  100 mg Oral Daily Tessa Puentes MD   100 mg at 09/08/20 0859    glucagon (human recombinant) injection 1 mg  1 mg Intramuscular PRN Tessa Puentes MD        glucose chewable tablet 16 g  16 g Oral PRN Tessa Puentes MD        glucose chewable tablet 24 g  24 g Oral PRN Tessa Puentes MD        levoFLOXacin 250 mg/50 mL IVPB 250 mg  250 mg Intravenous Q24H Tessa Puentes MD 50 mL/hr at " 09/07/20 1511 250 mg at 09/07/20 1511    ondansetron injection 4 mg  4 mg Intravenous Q8H PRN Tessa Puentes MD        pantoprazole 40 mg in dextrose 5 % 100 mL infusion (ready to mix system)  8 mg/hr Intravenous Continuous Tessa Puentes MD 20 mL/hr at 09/08/20 0858 8 mg/hr at 09/08/20 0858    polyethylene glycol packet 17 g  17 g Oral BID Tessa Puentes MD   17 g at 09/08/20 0859    sodium chloride 0.9% flush 10 mL  10 mL Intravenous PRN Tessa Puentes MD        tamsulosin 24 hr capsule 0.4 mg  0.4 mg Oral Daily Tessa Puentes MD   0.4 mg at 09/08/20 0859       Intake and Output:    Intake/Output Summary (Last 24 hours) at 9/8/2020 0914  Last data filed at 9/7/2020 2021  Gross per 24 hour   Intake 640 ml   Output --   Net 640 ml       Labs:  Lab Results   Component Value Date/Time    WBC 8.06 09/08/2020 05:56 AM    HGB 12.9 09/08/2020 05:56 AM    HCT 38.5 09/08/2020 05:56 AM     (L) 09/08/2020 05:56 AM    MCV 88 09/08/2020 05:56 AM     09/08/2020 05:56 AM     09/08/2020 05:56 AM    K 3.5 09/08/2020 05:56 AM    K 3.5 09/08/2020 05:56 AM     09/08/2020 05:56 AM     09/08/2020 05:56 AM    CO2 22 (L) 09/08/2020 05:56 AM    CO2 22 (L) 09/08/2020 05:56 AM    BUN 27 09/08/2020 05:56 AM    BUN 27 09/08/2020 05:56 AM    CREATININE 0.8 09/08/2020 05:56 AM    CREATININE 0.8 09/08/2020 05:56 AM    GLU 91 09/08/2020 05:56 AM    GLU 91 09/08/2020 05:56 AM    CALCIUM 10.0 09/08/2020 05:56 AM    CALCIUM 10.0 09/08/2020 05:56 AM    MG 1.9 09/08/2020 05:56 AM    PHOS 2.0 (L) 09/08/2020 05:56 AM    INR 1.2 11/21/2016 06:28 AM    APTT 26.5 11/21/2016 06:28 AM   ]  Lab Results   Component Value Date/Time    PROT 5.7 (L) 09/08/2020 05:56 AM    ALBUMIN 2.6 (L) 09/08/2020 05:56 AM    BILITOT 0.6 09/08/2020 05:56 AM    AST 19 09/08/2020 05:56 AM    ALT 11 09/08/2020 05:56 AM    ALKPHOS 80 09/08/2020 05:56 AM   ]    Imaging and Procedures:  Labs and imaging results were reviewed.  None  new    Assessment:  Gloria Zuleta is a 89 y.o. female with a history of CAD, CHF (EF 25%), sacral decubitus ulcer w/ osteomyelitis, right breast cancer, and lymphoma who presented as a transfer due to concern for GI bleed.  She is hemodynamically stable with no overt bleeding since arrival to [facility] [place] on Sunday.    Plan:  - serial H/H q12 or 24 hrs  - diet as tolerated  - monitor for overt bleeding  - miralax 17g BID and colace 100 mg daily  - hold off on endoscopic evaluation for now in the absence of anemia or overt bleeding  - can change PPI gtt to BID PPI    Olesya Hill MD

## 2020-09-08 NOTE — ASSESSMENT & PLAN NOTE
- Resistant to Levaquin, discontinued  - Started ceftriaxone after discussing with son.  Patient's chart states she is allergic to PCN and cephalexin, but he reports she has never had a reaction that he is aware of.  - Blood culture also growing Proteus.

## 2020-09-08 NOTE — ASSESSMENT & PLAN NOTE
- hypotensive upon admission  - Held lisinopril, Lasix, continued carvedilol 3.125 mg bid.  - monitor blood pressure

## 2020-09-09 PROBLEM — A41.59 PROTEUS SEPTICEMIA: Status: ACTIVE | Noted: 2020-09-09

## 2020-09-09 LAB
ALBUMIN SERPL BCP-MCNC: 2.4 G/DL (ref 3.5–5.2)
ALP SERPL-CCNC: 92 U/L (ref 55–135)
ALT SERPL W/O P-5'-P-CCNC: 25 U/L (ref 10–44)
ANION GAP SERPL CALC-SCNC: 9 MMOL/L (ref 8–16)
AST SERPL-CCNC: 33 U/L (ref 10–40)
BASOPHILS # BLD AUTO: 0.01 K/UL (ref 0–0.2)
BASOPHILS NFR BLD: 0.1 % (ref 0–1.9)
BILIRUB SERPL-MCNC: 0.6 MG/DL (ref 0.1–1)
BUN SERPL-MCNC: 24 MG/DL (ref 10–30)
CALCIUM SERPL-MCNC: 9.9 MG/DL (ref 8.7–10.5)
CHLORIDE SERPL-SCNC: 108 MMOL/L (ref 95–110)
CO2 SERPL-SCNC: 23 MMOL/L (ref 23–29)
CREAT SERPL-MCNC: 0.7 MG/DL (ref 0.5–1.4)
DIFFERENTIAL METHOD: ABNORMAL
EOSINOPHIL # BLD AUTO: 0 K/UL (ref 0–0.5)
EOSINOPHIL NFR BLD: 0.1 % (ref 0–8)
ERYTHROCYTE [DISTWIDTH] IN BLOOD BY AUTOMATED COUNT: 14.9 % (ref 11.5–14.5)
EST. GFR  (AFRICAN AMERICAN): >60 ML/MIN/1.73 M^2
EST. GFR  (NON AFRICAN AMERICAN): >60 ML/MIN/1.73 M^2
GLUCOSE SERPL-MCNC: 78 MG/DL (ref 70–110)
HCT VFR BLD AUTO: 36 % (ref 37–48.5)
HGB BLD-MCNC: 11.7 G/DL (ref 12–16)
IMM GRANULOCYTES # BLD AUTO: 0.02 K/UL (ref 0–0.04)
IMM GRANULOCYTES NFR BLD AUTO: 0.3 % (ref 0–0.5)
LYMPHOCYTES # BLD AUTO: 0.7 K/UL (ref 1–4.8)
LYMPHOCYTES NFR BLD: 9.6 % (ref 18–48)
MCH RBC QN AUTO: 29.1 PG (ref 27–31)
MCHC RBC AUTO-ENTMCNC: 32.5 G/DL (ref 32–36)
MCV RBC AUTO: 90 FL (ref 82–98)
MONOCYTES # BLD AUTO: 0.4 K/UL (ref 0.3–1)
MONOCYTES NFR BLD: 5.7 % (ref 4–15)
NEUTROPHILS # BLD AUTO: 5.9 K/UL (ref 1.8–7.7)
NEUTROPHILS NFR BLD: 84.2 % (ref 38–73)
NRBC BLD-RTO: 0 /100 WBC
PLATELET # BLD AUTO: 127 K/UL (ref 150–350)
PMV BLD AUTO: 9.8 FL (ref 9.2–12.9)
POTASSIUM SERPL-SCNC: 3.7 MMOL/L (ref 3.5–5.1)
PROT SERPL-MCNC: 5.4 G/DL (ref 6–8.4)
RBC # BLD AUTO: 4.02 M/UL (ref 4–5.4)
SODIUM SERPL-SCNC: 140 MMOL/L (ref 136–145)
WBC # BLD AUTO: 7 K/UL (ref 3.9–12.7)

## 2020-09-09 PROCEDURE — 99900035 HC TECH TIME PER 15 MIN (STAT)

## 2020-09-09 PROCEDURE — 25000003 PHARM REV CODE 250: Performed by: INTERNAL MEDICINE

## 2020-09-09 PROCEDURE — 80053 COMPREHEN METABOLIC PANEL: CPT

## 2020-09-09 PROCEDURE — 99233 SBSQ HOSP IP/OBS HIGH 50: CPT | Mod: ,,, | Performed by: HOSPITALIST

## 2020-09-09 PROCEDURE — C9113 INJ PANTOPRAZOLE SODIUM, VIA: HCPCS | Performed by: INTERNAL MEDICINE

## 2020-09-09 PROCEDURE — 63600175 PHARM REV CODE 636 W HCPCS: Performed by: INTERNAL MEDICINE

## 2020-09-09 PROCEDURE — 99222 1ST HOSP IP/OBS MODERATE 55: CPT | Mod: ,,, | Performed by: INTERNAL MEDICINE

## 2020-09-09 PROCEDURE — 99233 PR SUBSEQUENT HOSPITAL CARE,LEVL III: ICD-10-PCS | Mod: ,,, | Performed by: HOSPITALIST

## 2020-09-09 PROCEDURE — 11000001 HC ACUTE MED/SURG PRIVATE ROOM

## 2020-09-09 PROCEDURE — 99222 PR INITIAL HOSPITAL CARE,LEVL II: ICD-10-PCS | Mod: ,,, | Performed by: INTERNAL MEDICINE

## 2020-09-09 PROCEDURE — 27000221 HC OXYGEN, UP TO 24 HOURS

## 2020-09-09 PROCEDURE — 85025 COMPLETE CBC W/AUTO DIFF WBC: CPT

## 2020-09-09 PROCEDURE — 63600175 PHARM REV CODE 636 W HCPCS: Performed by: HOSPITALIST

## 2020-09-09 PROCEDURE — 36415 COLL VENOUS BLD VENIPUNCTURE: CPT

## 2020-09-09 PROCEDURE — 94761 N-INVAS EAR/PLS OXIMETRY MLT: CPT

## 2020-09-09 RX ADMIN — DOCUSATE SODIUM 100 MG: 100 CAPSULE, LIQUID FILLED ORAL at 09:09

## 2020-09-09 RX ADMIN — ATORVASTATIN CALCIUM 40 MG: 20 TABLET, FILM COATED ORAL at 09:09

## 2020-09-09 RX ADMIN — CEFTRIAXONE 1 G: 1 INJECTION, SOLUTION INTRAVENOUS at 04:09

## 2020-09-09 RX ADMIN — PANTOPRAZOLE SODIUM 40 MG: 40 INJECTION, POWDER, LYOPHILIZED, FOR SOLUTION INTRAVENOUS at 09:09

## 2020-09-09 RX ADMIN — TAMSULOSIN HYDROCHLORIDE 0.4 MG: 0.4 CAPSULE ORAL at 09:09

## 2020-09-09 RX ADMIN — CARVEDILOL 3.12 MG: 3.12 TABLET, FILM COATED ORAL at 09:09

## 2020-09-09 RX ADMIN — POLYETHYLENE GLYCOL 3350 17 G: 17 POWDER, FOR SOLUTION ORAL at 09:09

## 2020-09-09 NOTE — ASSESSMENT & PLAN NOTE
- SIRS criteria for sepsis include hypotension, tachycardia, leukocytosis.  Documented UTI/bacteremia with Proteus.  - Discontinued IV fluids as she is tolerating oral fluids and BP stable.

## 2020-09-09 NOTE — PROGRESS NOTES
[facility] Hospital Medicine  Progress Note    Patient Name: Gloria Zuleta  MRN: [id number]  Patient Class: IP- Inpatient   Admission Date: 9/7/2020  Length of Stay: 2 days  Attending Physician: Rachel Valdez MD  Primary Care Provider: [facility]        Subjective:     Principal Problem:Sepsis due to gram-negative UTI        HPI:  From H&P by Brianne Lehman PA:  Ms. Gloria Zuleta is a 89 y.o. female, with PMH of CAD, HFrEF, HTN, NICM, chronic sacral osteomyelitis, Stage IV sacral decubitus ulcer, Urinary incontinence, Dementia, HLD, who was admitted to [facility] ED on 9/6/20 with epigastric abdominal pain, nausea and vomiting x 1 day. ED evaluation showed a UTI with Sepsis. A CT showed renal stones bilaterally, US showed gallstones with gallbladder thickening associated with chronic cholecystitis without bile duct dilation or signs of acute infection. The patient was started on IV antibiotics, and admitted to the hospitalist service. On her first day of admission, shortly after starting ot eat dinner, she had an episode of nausea and vomiting, during which she vomited black liquid. Given concern for UGIB, she was started on IV Protonix, and H&H reassessed. She did have a drop from 16.0 to 14.1, which was favored to be 2/2 IV fluid dilution at the second CBC is her baseline HGB. Given lack of GI coverage at [facility], she was transferred to [facility] where she will be admitted to inpatient status to the ICU.     Overview/Hospital Course:  Patient was admitted to the ICU from [facility] where she was being treated for a UTI.  She had no further episodes of vomiting and expressed to the GI physician that she was hungry, so her diet was advanced and her H/H monitored closely rather than putting her through a procedure.  Noted after admission that she also had a prolapsed rectum that appeared quite irritated.  This has been evaluated by CRS in the past and is reducible.  Her urine culture grew  Proteus, and on 9/8 she also had a positive blood culture for Proteus.  She was continued on ceftriaxone to treat.  Transferred to the floor on 9/7.  ID evaluated her and recommended continue IV ceftriaxone to continue a 10 day course.    Interval History:  She ate a little bit of lunch, states she doesn't eat much normally.  No complaints.  She was surprised that she has a UTI.    Review of Systems   Constitutional: Negative for chills and fever.   Respiratory: Negative for cough and shortness of breath.    Cardiovascular: Negative for chest pain and palpitations.     Objective:     Vital Signs (Most Recent):  Temp: 98.3 °F (36.8 °C) (09/09/20 1621)  Pulse: 88 (09/09/20 1621)  Resp: 16 (09/09/20 1621)  BP: 112/70 (09/09/20 1621)  SpO2: 95 % (09/09/20 1621) Vital Signs (24h Range):  Temp:  [96.7 °F (35.9 °C)-98.8 °F (37.1 °C)] 98.3 °F (36.8 °C)  Pulse:  [88-93] 88  Resp:  [16-18] 16  SpO2:  [94 %-100 %] 95 %  BP: (112-149)/(62-79) 112/70     Weight: 49.1 kg (108 lb 3.9 oz)  Body mass index is 21.86 kg/m².    Intake/Output Summary (Last 24 hours) at 9/9/2020 1742  Last data filed at 9/9/2020 1626  Gross per 24 hour   Intake 1000 ml   Output --   Net 1000 ml      Physical Exam  Constitutional:       Appearance: She is well-developed.      Comments: Frail, elderly woman in NAD, hard of hearing.   HENT:      Head: Normocephalic.   Eyes:      Conjunctiva/sclera: Conjunctivae normal.      Pupils: Pupils are equal, round, and reactive to light.   Neck:      Musculoskeletal: Neck supple.      Thyroid: No thyromegaly.   Cardiovascular:      Rate and Rhythm: Normal rate and regular rhythm.      Heart sounds: Normal heart sounds. No murmur. No friction rub. No gallop.    Pulmonary:      Effort: Pulmonary effort is normal.      Breath sounds: Normal breath sounds.   Abdominal:      General: Bowel sounds are normal. There is no distension.      Palpations: Abdomen is soft.      Tenderness: There is no abdominal tenderness.  "  Musculoskeletal: Normal range of motion.   Lymphadenopathy:      Cervical: No cervical adenopathy.   Skin:     General: Skin is warm and dry.      Findings: No rash.   Neurological:      Mental Status: She is alert.      Comments: Not oriented, believes she is at home.   Psychiatric:         Behavior: Behavior normal.         Thought Content: Thought content normal.         Significant Labs: All pertinent labs within the past 24 hours have been reviewed.    Significant Imaging: I have reviewed all pertinent imaging results/findings within the past 24 hours.      Assessment/Plan:      * Sepsis due to gram-negative UTI  - SIRS criteria for sepsis include hypotension, tachycardia, leukocytosis.  Documented UTI/bacteremia with Proteus.  - Discontinued IV fluids as she is tolerating oral fluids and BP stable.      Hematemesis with nausea  - Patient admitted to [facility] with UTI, transferred to [facility] [place]2 suspected upper GI bleed with "black vomiting" after dinner at the other hospital.  - H&H did drop to 14.1 from 16.0 at time of admission, although Hb 14 is her baseline, so this was felt be more likely represent hemodilution as she has been receiving IV fluids.  - GI bleed order set initiated   - GI consulted, feel she does not have acute GI bleeding and will monitor for now, especially given bacteremia and UTI.  - PPI changed to oral, patient tolerating diet.    Urinary tract infection due to Proteus  - Resistant to Levaquin, discontinued  - Started ceftriaxone after discussing with son.  Patient's chart states she is allergic to PCN and cephalexin, but he reports she has never had a reaction that he is aware of.  No problems overnight on ceftriaxone.  - Blood culture also growing Proteus.      Gram-negative bacteremia  - Proteus UTI and bacteremia  - Continue ceftriaxone.  - ID recommends 10 day course antibiotics.  Probably can get SNF prior to returning to NH.    Proteus septicemia  - As above.       Calculus of " gallbladder with chronic cholecystitis without obstruction  - noted upon ED imaging   - no signs of acute infection or obstruction, negative sonographic Peña sign.  - monitor       Chronic combined systolic and diastolic heart failure  - last Echo with EF 25%   - appears compensated at present   - monitor I&O and daily weights   - Hold Lasix, lisinopril for now, continue carvedilol.    Decubitus ulcer of sacral region, stage 4  - wound care consulted, note this is nearly healed  - blanching erythema at sacrum noted at present       Dementia without behavioral disturbance  - delirium precautions ordered   - Likely Alzheimer's type dementia.  She is pleasant and cooperative, no evidence of behavioral issues.    Hyperlipemia  - continue statin  - last lipid panel:  Results for ETHAN BEY (MRN [id number]) as of 9/7/2020 01:32   Ref. Range 1/27/2019 05:34   Cholesterol Latest Ref Range: 120 - 199 mg/dL 128   HDL Latest Ref Range: 40 - 75 mg/dL 43   Hdl/Cholesterol Ratio Latest Ref Range: 20.0 - 50.0 % 33.6   LDL Cholesterol External Latest Ref Range: 63.0 - 159.0 mg/dL 69.8   Non-HDL Cholesterol Latest Units: mg/dL 85   Total Cholesterol/HDL Ratio Latest Ref Range: 2.0 - 5.0  3.0   Triglycerides Latest Ref Range: 30 - 150 mg/dL 76         Essential hypertension  - hypotensive upon admission although now has had a few higher readings.  - Held lisinopril, Lasix, continued carvedilol 3.125 mg bid.  - monitor blood pressure       CAD in native artery  - continuing statin  - holding ASA in light of UGIB         VTE Risk Mitigation (From admission, onward)         Ordered     IP VTE HIGH RISK PATIENT  Once      09/07/20 0052     Place sequential compression device  Until discontinued      09/07/20 0052     Reason for No Pharmacological VTE Prophylaxis  Once     Question:  Reasons:  Answer:  Active Bleeding    09/07/20 0052     Place MINE hose  Until discontinued      09/07/20 0030                Discharge Planning   AFLRED:  9/10/2020     Code Status: DNR   Is the patient medically ready for discharge?:     Reason for patient still in hospital (select all that apply): Patient trending condition and Treatment  Discharge Plan A: Return to nursing home                  Rachel Hagan MD  Department of Hospital Medicine   [facility]

## 2020-09-09 NOTE — SUBJECTIVE & OBJECTIVE
Past Medical History:   Diagnosis Date    Breast CA     right    CAD (coronary artery disease)     CHF (congestive heart failure)     Hypertension     Labial abscess     Lymphoma     creceived chemo treatment    Osteoarthritis        Past Surgical History:   Procedure Laterality Date    ABSCESS DRAINAGE      labia    COLONOSCOPY      dialtion      urethral dialtion    ESOPHAGOGASTRODUODENOSCOPY      HYSTERECTOMY      total abdominal    ALEXANDRU    NECK EXPLORATION      exploratory lap & Bx of  RIGHT neck    UPPER GASTROINTESTINAL ENDOSCOPY         Review of patient's allergies indicates:   Allergen Reactions    Bactrim [sulfamethoxazole-trimethoprim]     Codeine     Cortisone     Keflex [cephalexin]     Pcn [penicillins]     Sulfadiazine     Sumycin 250     Contrast media Other (See Comments)     unknown    Donnatal [phenobarb-hyoscy-atropine-scop] Other (See Comments)     Unknown     Restoril [temazepam] Other (See Comments)     unknown       Medications:  Medications Prior to Admission   Medication Sig    ascorbic acid, vitamin C, (VITAMIN C) 500 MG tablet Take 500 mg by mouth once daily.    aspirin 81 MG Chew Take 1 tablet (81 mg total) by mouth once daily.    atorvastatin (LIPITOR) 40 MG tablet Take 1 tablet (40 mg total) by mouth once daily.    carvedilol (COREG) 3.125 MG tablet Take 1 tablet (3.125 mg total) by mouth 2 (two) times daily with meals.    collagenase ointment Apply topically once daily. To sacral wound    escitalopram oxalate (LEXAPRO) 10 MG tablet Take 1 tablet (10 mg total) by mouth once daily.    furosemide (LASIX) 40 MG tablet Take 1 tablet (40 mg total) by mouth once daily.    lisinopril (PRINIVIL,ZESTRIL) 5 MG tablet Take 1 tablet (5 mg total) by mouth once daily.    MULTIVIT-IRON-MIN-FOLIC ACID 3,500-18-0.4 UNIT-MG-MG ORAL CHEW Take by mouth.    nitroglycerin (NITROSTAT) 0.6 MG Subl Place 0.4 mg under the tongue every 5 (five) minutes as needed.    tamsulosin  (FLOMAX) 0.4 mg Cap Take 1 capsule (0.4 mg total) by mouth once daily.    tolterodine (DETROL LA) 4 MG 24 hr capsule Take 1 capsule (4 mg total) by mouth once daily.     Antibiotics (From admission, onward)    Start     Stop Route Frequency Ordered    09/08/20 1615  cefTRIAXone (ROCEPHIN) 1 g/50 mL D5W IVPB      -- IV Every 24 hours (non-standard times) 09/08/20 1511        Antifungals (From admission, onward)    None        Antivirals (From admission, onward)    None           Immunization History   Administered Date(s) Administered    Influenza 09/10/2010    Influenza - High Dose - PF (65 years and older) 11/12/2015, 10/06/2016    Pneumococcal Conjugate - 13 Valent 03/13/2017       Family History     Problem Relation (Age of Onset)    No Known Problems Mother, Father        Social History     Socioeconomic History    Marital status:      Spouse name: Not on file    Number of children: Not on file    Years of education: Not on file    Highest education level: Not on file   Occupational History    Not on file   Social Needs    Financial resource strain: Not on file    Food insecurity     Worry: Not on file     Inability: Not on file    Transportation needs     Medical: Not on file     Non-medical: Not on file   Tobacco Use    Smoking status: Never Smoker    Smokeless tobacco: Never Used   Substance and Sexual Activity    Alcohol use: No    Drug use: No    Sexual activity: Not Currently   Lifestyle    Physical activity     Days per week: Not on file     Minutes per session: Not on file    Stress: Not on file   Relationships    Social connections     Talks on phone: Not on file     Gets together: Not on file     Attends Mu-ism service: Not on file     Active member of club or organization: Not on file     Attends meetings of clubs or organizations: Not on file     Relationship status: Not on file   Other Topics Concern    Not on file   Social History Narrative    Living at [facility]  [place] for a year. Confined to a wheelchair. Daughter in law and son help care for her. She has a caregiver during the day. One son.      Review of Systems   Gastrointestinal: Positive for blood in stool.   All other systems reviewed and are negative.    Objective:     Vital Signs (Most Recent):  Temp: 98.8 °F (37.1 °C) (09/09/20 1144)  Pulse: 93 (09/09/20 1144)  Resp: 16 (09/09/20 1144)  BP: 128/76 (09/09/20 1144)  SpO2: (!) 94 % (09/09/20 1144) Vital Signs (24h Range):  Temp:  [96.7 °F (35.9 °C)-98.8 °F (37.1 °C)] 98.8 °F (37.1 °C)  Pulse:  [89-99] 93  Resp:  [16-18] 16  SpO2:  [94 %-100 %] 94 %  BP: (115-149)/(62-79) 128/76     Weight: 49.1 kg (108 lb 3.9 oz)  Body mass index is 21.86 kg/m².    Estimated Creatinine Clearance: 40.6 mL/min (based on SCr of 0.7 mg/dL).    Physical Exam  Vitals signs and nursing note reviewed.   Constitutional:       General: She is not in acute distress.     Appearance: Normal appearance. She is not ill-appearing, toxic-appearing or diaphoretic.   HENT:      Head: Normocephalic and atraumatic.      Right Ear: External ear normal.      Left Ear: External ear normal.      Mouth/Throat:      Comments: False dentition  Eyes:      Extraocular Movements: Extraocular movements intact.      Conjunctiva/sclera: Conjunctivae normal.      Pupils: Pupils are equal, round, and reactive to light.   Cardiovascular:      Rate and Rhythm: Normal rate and regular rhythm.      Pulses: Normal pulses.      Heart sounds: Murmur present. No gallop.    Pulmonary:      Effort: Pulmonary effort is normal.      Breath sounds: Normal breath sounds. No wheezing or rhonchi.   Chest:      Chest wall: No tenderness.   Abdominal:      General: Abdomen is flat.      Palpations: Abdomen is soft.      Tenderness: There is abdominal tenderness.   Musculoskeletal: Normal range of motion.         General: No swelling or deformity.   Skin:     General: Skin is warm and dry.      Findings: No erythema or rash.    Neurological:      General: No focal deficit present.      Mental Status: She is alert and oriented to person, place, and time. Mental status is at baseline.   Psychiatric:         Mood and Affect: Mood normal.         Behavior: Behavior normal.         Thought Content: Thought content normal.         Judgment: Judgment normal.         Significant Labs:   Blood Culture:   Recent Labs   Lab 09/06/20  0950 09/06/20  0959   LABBLOO Gram stain shelli bottle: Gram negative rods   Positive results previously called 09/07/2020  02:21  Gram stain aer bottle: Gram negative rods  Positive results previously called 09/07/2020  05:49  PROTEUS MIRABILIS* Gram stain shelli bottle: Gram negative rods   Results called to and read back by: Yobani Winston RN 09/07/2020  01:02  Gram stain aer bottle: Gram negative rods   Positive results previously called 09/07/2020  03:52  PROTEUS MIRABILIS  For susceptibility see order #[id number]  *     CBC:   Recent Labs   Lab 09/08/20  0556 09/09/20  0446   WBC 8.06 7.00   HGB 12.9 11.7*   HCT 38.5 36.0*   * 127*     CMP:   Recent Labs   Lab 09/08/20  0556 09/09/20  0446     138 140   K 3.5  3.5 3.7     106 108   CO2 22*  22* 23   GLU 91  91 78   BUN 27  27 24   CREATININE 0.8  0.8 0.7   CALCIUM 10.0  10.0 9.9   PROT 5.7* 5.4*   ALBUMIN 2.6* 2.4*   BILITOT 0.6 0.6   ALKPHOS 80 92   AST 19 33   ALT 11 25   ANIONGAP 10  10 9   EGFRNONAA >60  >60 >60     Urine Culture:   Recent Labs   Lab 09/06/20  0954   LABURIN PROTEUS MIRABILIS  > 100,000 cfu/ml  No other significant isolate  *       Significant Imaging: I have reviewed all pertinent imaging results/findings within the past 24 hours.

## 2020-09-09 NOTE — PLAN OF CARE
Plan of care discussed with pt. PT AOx2-3. VS stable overnight on 2LNC. No emesis or active bleeding noted overnight.  Pt voiding per bedpan. Pt w/ rectal prolapse- having urge to have BM frequently. Small stool this morning. Triad ointment and foam applied to healing sacral wound. Repositioned in bed frequently w/ pillows.   Pt denies CP, SOB, or pain/discomfort at this time.Pt free of injuries this shift.  All questions addressed.  Will continue to monitor.

## 2020-09-09 NOTE — PROGRESS NOTES
Subjective:       Patient ID: Gloria Zuleta is a 89 y.o. female.    Chief Complaint:  No chief complaint on file.       History of Present Illness  Pt tolerating po diet denies any gi bleed    Review of Systems  Cardiovascular: negative      Objective:     Vitals:    09/08/20 2338 09/09/20 0430 09/09/20 0745 09/09/20 0810   BP: 132/70 120/62 (!) 149/74    BP Location: Left arm Left arm     Patient Position: Lying Lying Lying    Pulse: 93 93 89 91   Resp: 16 18 18 18   Temp: 97.8 °F (36.6 °C) 97.5 °F (36.4 °C) 98 °F (36.7 °C)    TempSrc: Axillary Oral Oral    SpO2: (!) 94% (!) 94% 100% 96%   Weight:       Height:          Abdomen: soft, non-tender; bowel sounds normal; no masses,  no organomegaly    Data Review   Recent Results (from the past 336 hour(s))   CBC auto differential    Collection Time: 09/09/20  4:46 AM   Result Value Ref Range    WBC 7.00 3.90 - 12.70 K/uL    Hemoglobin 11.7 (L) 12.0 - 16.0 g/dL    Hematocrit 36.0 (L) 37.0 - 48.5 %    Platelets 127 (L) 150 - 350 K/uL   CBC auto differential    Collection Time: 09/08/20  5:56 AM   Result Value Ref Range    WBC 8.06 3.90 - 12.70 K/uL    Hemoglobin 12.9 12.0 - 16.0 g/dL    Hematocrit 38.5 37.0 - 48.5 %    Platelets 101 (L) 150 - 350 K/uL   CBC auto differential    Collection Time: 09/07/20  2:05 PM   Result Value Ref Range    WBC 11.04 3.90 - 12.70 K/uL    Hemoglobin 13.7 12.0 - 16.0 g/dL    Hematocrit 41.2 37.0 - 48.5 %    Platelets 132 (L) 150 - 350 K/uL      No results for input(s): APTT, INR, PTT in the last 168 hours.  Recent Labs   Lab 09/07/20  0400 09/08/20  0556 09/09/20  0446    138  138 140   K 3.9 3.5  3.5 3.7    106  106 108   CO2 23 22*  22* 23   BUN 55* 27  27 24   CREATININE 1.4 0.8  0.8 0.7   CALCIUM 9.6 10.0  10.0 9.9   PROT 5.5* 5.7* 5.4*   BILITOT 0.8 0.6 0.6   ALKPHOS 71 80 92   ALT 6* 11 25   AST 13 19 33    No results found for: HAV, HEPAIGM, HEPBIGM, HEPBCAB, HBEAG, HEPCAB No results found for: AFP   No results  found for: CEA   No results for input(s): APTT, INR, PTT in the last 168 hours.     Assessment:     1. GI bleed    2. Sepsis        Plan:     1) serial h/h  2) PPI  3) hold off on endoscopy for now

## 2020-09-09 NOTE — CONSULTS
[facility]  Infectious Disease  Consult Note    Patient Name: Gloria Zuleta  MRN: [id number]  Admission Date: 9/7/2020  Hospital Length of Stay: 2 days  Attending Physician: Rachel Valdez MD  Primary Care Provider: [facility]     Isolation Status: No active isolations    Patient information was obtained from patient, past medical records and ER records.      Inpatient consult to Infectious Diseases  Consult performed by: Lamberto Irwin MD  Consult ordered by: Rachel Valdez MD        Assessment/Plan:     Proteus septicemia  - likely source:  v GI (with  seeding)?  - will repeat blood cultures to ensure clearance  - recommend 10 days of CTX    Thank you for your consult. I will follow-up with patient. Please contact us if you have any additional questions.    Lamberto Irwin MD  Infectious Disease  [facility]    Subjective:     Principal Problem: Sepsis due to gram-negative UTI    HPI: Patient is a 89-year-old female with a known past medical history of hypertension, hyperlipidemia, CHF, coronary disease, and GERD that presents to the ER with abdominal pain, nausea, and vomiting. Patient reported worsening abdominal pain and nausea.  Abdominal pain associated with nausea and 3 episodes of nonbloody, nonbilious vomiting.  Patient reports her last bowel movement was yesterday and that she has not been constipated.  When her pain did not improve, she went to ER for evaluation.  On arrival to ER, patient found to be tachypneic and labs remarkable for leukocytosis.  UA consistent with UTI.  Sepsis protocol initiated and patient received 2 L IV fluid bolus, blood in urine cultures were obtained, and IV antibiotics were initiated.  CT of the abdomen and pelvis showed bilateral nephrolithiasis with renal stones measuring in excess of 1.1 cm.  Ultrasound of the liver and gallbladder showed gallstones with gallbladder thickening associated with chronic  cholecystitis but no bile duct dilatation or signs of acute infection.  Patient admitted for further evaluation and management of sepsis, UTI, SVEN, nephrolithiasis and cholelithiasis. Concerning for possible upper GI bleed, patient made NPO, started on IV Protonix, and repeat H&H ordered.  Hemoglobin has dropped from 16.0 to 14.1.  It is possible this drop is due to hemodilution from IV fluids but in conjunction with abdominal pain and black vomitus, high concern for upper GI bleed still present.  The patient was transferred to [facility] for GI consultation. Urine and blood cultures have grown Proteus. ID is consulted for that. The patient is without fever or chills. She denies dysuria or rigors.    Past Medical History:   Diagnosis Date    Breast CA     right    CAD (coronary artery disease)     CHF (congestive heart failure)     Hypertension     Labial abscess     Lymphoma     creceived chemo treatment    Osteoarthritis        Past Surgical History:   Procedure Laterality Date    ABSCESS DRAINAGE      labia    COLONOSCOPY      dialtion      urethral dialtion    ESOPHAGOGASTRODUODENOSCOPY      HYSTERECTOMY      total abdominal    ALEXANDRU    NECK EXPLORATION      exploratory lap & Bx of  RIGHT neck    UPPER GASTROINTESTINAL ENDOSCOPY         Review of patient's allergies indicates:   Allergen Reactions    Bactrim [sulfamethoxazole-trimethoprim]     Codeine     Cortisone     Keflex [cephalexin]     Pcn [penicillins]     Sulfadiazine     Sumycin 250     Contrast media Other (See Comments)     unknown    Donnatal [phenobarb-hyoscy-atropine-scop] Other (See Comments)     Unknown     Restoril [temazepam] Other (See Comments)     unknown       Medications:  Medications Prior to Admission   Medication Sig    ascorbic acid, vitamin C, (VITAMIN C) 500 MG tablet Take 500 mg by mouth once daily.    aspirin 81 MG Chew Take 1 tablet (81 mg total) by mouth once daily.    atorvastatin (LIPITOR) 40 MG tablet Take  1 tablet (40 mg total) by mouth once daily.    carvedilol (COREG) 3.125 MG tablet Take 1 tablet (3.125 mg total) by mouth 2 (two) times daily with meals.    collagenase ointment Apply topically once daily. To sacral wound    escitalopram oxalate (LEXAPRO) 10 MG tablet Take 1 tablet (10 mg total) by mouth once daily.    furosemide (LASIX) 40 MG tablet Take 1 tablet (40 mg total) by mouth once daily.    lisinopril (PRINIVIL,ZESTRIL) 5 MG tablet Take 1 tablet (5 mg total) by mouth once daily.    MULTIVIT-IRON-MIN-FOLIC ACID 3,500-18-0.4 UNIT-MG-MG ORAL CHEW Take by mouth.    nitroglycerin (NITROSTAT) 0.6 MG Subl Place 0.4 mg under the tongue every 5 (five) minutes as needed.    tamsulosin (FLOMAX) 0.4 mg Cap Take 1 capsule (0.4 mg total) by mouth once daily.    tolterodine (DETROL LA) 4 MG 24 hr capsule Take 1 capsule (4 mg total) by mouth once daily.     Antibiotics (From admission, onward)    Start     Stop Route Frequency Ordered    09/08/20 1615  cefTRIAXone (ROCEPHIN) 1 g/50 mL D5W IVPB      -- IV Every 24 hours (non-standard times) 09/08/20 1511        Antifungals (From admission, onward)    None        Antivirals (From admission, onward)    None           Immunization History   Administered Date(s) Administered    Influenza 09/10/2010    Influenza - High Dose - PF (65 years and older) 11/12/2015, 10/06/2016    Pneumococcal Conjugate - 13 Valent 03/13/2017       Family History     Problem Relation (Age of Onset)    No Known Problems Mother, Father        Social History     Socioeconomic History    Marital status:      Spouse name: Not on file    Number of children: Not on file    Years of education: Not on file    Highest education level: Not on file   Occupational History    Not on file   Social Needs    Financial resource strain: Not on file    Food insecurity     Worry: Not on file     Inability: Not on file    Transportation needs     Medical: Not on file     Non-medical: Not on  file   Tobacco Use    Smoking status: Never Smoker    Smokeless tobacco: Never Used   Substance and Sexual Activity    Alcohol use: No    Drug use: No    Sexual activity: Not Currently   Lifestyle    Physical activity     Days per week: Not on file     Minutes per session: Not on file    Stress: Not on file   Relationships    Social connections     Talks on phone: Not on file     Gets together: Not on file     Attends Islam service: Not on file     Active member of club or organization: Not on file     Attends meetings of clubs or organizations: Not on file     Relationship status: Not on file   Other Topics Concern    Not on file   Social History Narrative    Living at [facility] for a year. Confined to a wheelchair. Daughter in law and son help care for her. She has a caregiver during the day. One son.      Review of Systems   Gastrointestinal: Positive for blood in stool.   All other systems reviewed and are negative.    Objective:     Vital Signs (Most Recent):  Temp: 98.8 °F (37.1 °C) (09/09/20 1144)  Pulse: 93 (09/09/20 1144)  Resp: 16 (09/09/20 1144)  BP: 128/76 (09/09/20 1144)  SpO2: (!) 94 % (09/09/20 1144) Vital Signs (24h Range):  Temp:  [96.7 °F (35.9 °C)-98.8 °F (37.1 °C)] 98.8 °F (37.1 °C)  Pulse:  [89-99] 93  Resp:  [16-18] 16  SpO2:  [94 %-100 %] 94 %  BP: (115-149)/(62-79) 128/76     Weight: 49.1 kg (108 lb 3.9 oz)  Body mass index is 21.86 kg/m².    Estimated Creatinine Clearance: 40.6 mL/min (based on SCr of 0.7 mg/dL).    Physical Exam  Vitals signs and nursing note reviewed.   Constitutional:       General: She is not in acute distress.     Appearance: Normal appearance. She is not ill-appearing, toxic-appearing or diaphoretic.   HENT:      Head: Normocephalic and atraumatic.      Right Ear: External ear normal.      Left Ear: External ear normal.      Mouth/Throat:      Comments: False dentition  Eyes:      Extraocular Movements: Extraocular movements intact.       Conjunctiva/sclera: Conjunctivae normal.      Pupils: Pupils are equal, round, and reactive to light.   Cardiovascular:      Rate and Rhythm: Normal rate and regular rhythm.      Pulses: Normal pulses.      Heart sounds: Murmur present. No gallop.    Pulmonary:      Effort: Pulmonary effort is normal.      Breath sounds: Normal breath sounds. No wheezing or rhonchi.   Chest:      Chest wall: No tenderness.   Abdominal:      General: Abdomen is flat.      Palpations: Abdomen is soft.      Tenderness: There is abdominal tenderness.   Musculoskeletal: Normal range of motion.         General: No swelling or deformity.   Skin:     General: Skin is warm and dry.      Findings: No erythema or rash.   Neurological:      General: No focal deficit present.      Mental Status: She is alert and oriented to person, place, and time. Mental status is at baseline.   Psychiatric:         Mood and Affect: Mood normal.         Behavior: Behavior normal.         Thought Content: Thought content normal.         Judgment: Judgment normal.         Significant Labs:   Blood Culture:   Recent Labs   Lab 09/06/20  0950 09/06/20  0959   LABBLOO Gram stain shelli bottle: Gram negative rods   Positive results previously called 09/07/2020  02:21  Gram stain aer bottle: Gram negative rods  Positive results previously called 09/07/2020  05:49  PROTEUS MIRABILIS* Gram stain shelli bottle: Gram negative rods   Results called to and read back by: Yobani Winston RN 09/07/2020  01:02  Gram stain aer bottle: Gram negative rods   Positive results previously called 09/07/2020  03:52  PROTEUS MIRABILIS  For susceptibility see order #[id number]  *     CBC:   Recent Labs   Lab 09/08/20  0556 09/09/20  0446   WBC 8.06 7.00   HGB 12.9 11.7*   HCT 38.5 36.0*   * 127*     CMP:   Recent Labs   Lab 09/08/20  0556 09/09/20  0446     138 140   K 3.5  3.5 3.7     106 108   CO2 22*  22* 23   GLU 91  91 78   BUN 27  27 24   CREATININE 0.8   0.8 0.7   CALCIUM 10.0  10.0 9.9   PROT 5.7* 5.4*   ALBUMIN 2.6* 2.4*   BILITOT 0.6 0.6   ALKPHOS 80 92   AST 19 33   ALT 11 25   ANIONGAP 10  10 9   EGFRNONAA >60  >60 >60     Urine Culture:   Recent Labs   Lab 09/06/20  0954   LABURIN PROTEUS MIRABILIS  > 100,000 cfu/ml  No other significant isolate  *       Significant Imaging: I have reviewed all pertinent imaging results/findings within the past 24 hours.

## 2020-09-09 NOTE — ASSESSMENT & PLAN NOTE
- hypotensive upon admission although now has had a few higher readings.  - Held lisinopril, Lasix, continued carvedilol 3.125 mg bid.  - monitor blood pressure

## 2020-09-09 NOTE — ASSESSMENT & PLAN NOTE
- Proteus UTI and bacteremia  - Continue ceftriaxone.  - ID recommends 10 day course antibiotics.  Probably can get SNF prior to returning to NH.

## 2020-09-09 NOTE — HPI
Patient is a 89-year-old female with a known past medical history of hypertension, hyperlipidemia, CHF, coronary disease, and GERD that presents to the ER with abdominal pain, nausea, and vomiting. Patient reported worsening abdominal pain and nausea.  Abdominal pain associated with nausea and 3 episodes of nonbloody, nonbilious vomiting.  Patient reports her last bowel movement was yesterday and that she has not been constipated.  When her pain did not improve, she went to ER for evaluation.  On arrival to ER, patient found to be tachypneic and labs remarkable for leukocytosis.  UA consistent with UTI.  Sepsis protocol initiated and patient received 2 L IV fluid bolus, blood in urine cultures were obtained, and IV antibiotics were initiated.  CT of the abdomen and pelvis showed bilateral nephrolithiasis with renal stones measuring in excess of 1.1 cm.  Ultrasound of the liver and gallbladder showed gallstones with gallbladder thickening associated with chronic cholecystitis but no bile duct dilatation or signs of acute infection.  Patient admitted for further evaluation and management of sepsis, UTI, SVEN, nephrolithiasis and cholelithiasis. Concerning for possible upper GI bleed, patient made NPO, started on IV Protonix, and repeat H&H ordered.  Hemoglobin has dropped from 16.0 to 14.1.  It is possible this drop is due to hemodilution from IV fluids but in conjunction with abdominal pain and black vomitus, high concern for upper GI bleed still present.  The patient was transferred to [facility] for GI consultation. Urine and blood cultures have grown Proteus. ID is consulted for that. The patient is without fever or chills. She denies dysuria or rigors.

## 2020-09-09 NOTE — ASSESSMENT & PLAN NOTE
"- Patient admitted to [facility] with UTI, transferred to [facility] 2/2 suspected upper GI bleed with "black vomiting" after dinner at the other hospital.  - H&H did drop to 14.1 from 16.0 at time of admission, although Hb 14 is her baseline, so this was felt be more likely represent hemodilution as she has been receiving IV fluids.  - GI bleed order set initiated   - GI consulted, feel she does not have acute GI bleeding and will monitor for now, especially given bacteremia and UTI.  - PPI changed to oral, patient tolerating diet.  "

## 2020-09-09 NOTE — SUBJECTIVE & OBJECTIVE
Interval History:  She ate a little bit of lunch, states she doesn't eat much normally.  No complaints.  She was surprised that she has a UTI.    Review of Systems   Constitutional: Negative for chills and fever.   Respiratory: Negative for cough and shortness of breath.    Cardiovascular: Negative for chest pain and palpitations.     Objective:     Vital Signs (Most Recent):  Temp: 98.3 °F (36.8 °C) (09/09/20 1621)  Pulse: 88 (09/09/20 1621)  Resp: 16 (09/09/20 1621)  BP: 112/70 (09/09/20 1621)  SpO2: 95 % (09/09/20 1621) Vital Signs (24h Range):  Temp:  [96.7 °F (35.9 °C)-98.8 °F (37.1 °C)] 98.3 °F (36.8 °C)  Pulse:  [88-93] 88  Resp:  [16-18] 16  SpO2:  [94 %-100 %] 95 %  BP: (112-149)/(62-79) 112/70     Weight: 49.1 kg (108 lb 3.9 oz)  Body mass index is 21.86 kg/m².    Intake/Output Summary (Last 24 hours) at 9/9/2020 1742  Last data filed at 9/9/2020 1626  Gross per 24 hour   Intake 1000 ml   Output --   Net 1000 ml      Physical Exam  Constitutional:       Appearance: She is well-developed.      Comments: Frail, elderly woman in NAD, hard of hearing.   HENT:      Head: Normocephalic.   Eyes:      Conjunctiva/sclera: Conjunctivae normal.      Pupils: Pupils are equal, round, and reactive to light.   Neck:      Musculoskeletal: Neck supple.      Thyroid: No thyromegaly.   Cardiovascular:      Rate and Rhythm: Normal rate and regular rhythm.      Heart sounds: Normal heart sounds. No murmur. No friction rub. No gallop.    Pulmonary:      Effort: Pulmonary effort is normal.      Breath sounds: Normal breath sounds.   Abdominal:      General: Bowel sounds are normal. There is no distension.      Palpations: Abdomen is soft.      Tenderness: There is no abdominal tenderness.   Musculoskeletal: Normal range of motion.   Lymphadenopathy:      Cervical: No cervical adenopathy.   Skin:     General: Skin is warm and dry.      Findings: No rash.   Neurological:      Mental Status: She is alert.      Comments: Not  oriented, believes she is at home.   Psychiatric:         Behavior: Behavior normal.         Thought Content: Thought content normal.         Significant Labs: All pertinent labs within the past 24 hours have been reviewed.    Significant Imaging: I have reviewed all pertinent imaging results/findings within the past 24 hours.

## 2020-09-09 NOTE — ASSESSMENT & PLAN NOTE
- likely source:  v GI (with  seeding)?  - will repeat blood cultures to ensure clearance  - recommend 10 days of CTX

## 2020-09-10 PROBLEM — A41.59 PROTEUS SEPTICEMIA: Status: ACTIVE | Noted: 2020-09-08

## 2020-09-10 PROBLEM — K80.20 CALCULUS OF GALLBLADDER WITHOUT CHOLECYSTITIS WITHOUT OBSTRUCTION: Status: ACTIVE | Noted: 2020-09-06

## 2020-09-10 LAB
ALBUMIN SERPL BCP-MCNC: 2.3 G/DL (ref 3.5–5.2)
ALP SERPL-CCNC: 76 U/L (ref 55–135)
ALT SERPL W/O P-5'-P-CCNC: 27 U/L (ref 10–44)
ANION GAP SERPL CALC-SCNC: 9 MMOL/L (ref 8–16)
AST SERPL-CCNC: 27 U/L (ref 10–40)
BASOPHILS # BLD AUTO: 0.01 K/UL (ref 0–0.2)
BASOPHILS NFR BLD: 0.1 % (ref 0–1.9)
BILIRUB SERPL-MCNC: 0.6 MG/DL (ref 0.1–1)
BUN SERPL-MCNC: 21 MG/DL (ref 10–30)
CALCIUM SERPL-MCNC: 9.4 MG/DL (ref 8.7–10.5)
CHLORIDE SERPL-SCNC: 105 MMOL/L (ref 95–110)
CO2 SERPL-SCNC: 24 MMOL/L (ref 23–29)
CREAT SERPL-MCNC: 0.6 MG/DL (ref 0.5–1.4)
DIFFERENTIAL METHOD: ABNORMAL
EOSINOPHIL # BLD AUTO: 0 K/UL (ref 0–0.5)
EOSINOPHIL NFR BLD: 0.4 % (ref 0–8)
ERYTHROCYTE [DISTWIDTH] IN BLOOD BY AUTOMATED COUNT: 14.7 % (ref 11.5–14.5)
EST. GFR  (AFRICAN AMERICAN): >60 ML/MIN/1.73 M^2
EST. GFR  (NON AFRICAN AMERICAN): >60 ML/MIN/1.73 M^2
GLUCOSE SERPL-MCNC: 83 MG/DL (ref 70–110)
HCT VFR BLD AUTO: 36.4 % (ref 37–48.5)
HGB BLD-MCNC: 11.7 G/DL (ref 12–16)
IMM GRANULOCYTES # BLD AUTO: 0.01 K/UL (ref 0–0.04)
IMM GRANULOCYTES NFR BLD AUTO: 0.1 % (ref 0–0.5)
LYMPHOCYTES # BLD AUTO: 0.7 K/UL (ref 1–4.8)
LYMPHOCYTES NFR BLD: 9.3 % (ref 18–48)
MCH RBC QN AUTO: 28.8 PG (ref 27–31)
MCHC RBC AUTO-ENTMCNC: 32.1 G/DL (ref 32–36)
MCV RBC AUTO: 90 FL (ref 82–98)
MONOCYTES # BLD AUTO: 0.5 K/UL (ref 0.3–1)
MONOCYTES NFR BLD: 6.5 % (ref 4–15)
NEUTROPHILS # BLD AUTO: 6.3 K/UL (ref 1.8–7.7)
NEUTROPHILS NFR BLD: 83.6 % (ref 38–73)
NRBC BLD-RTO: 0 /100 WBC
PLATELET # BLD AUTO: 117 K/UL (ref 150–350)
PMV BLD AUTO: 9.3 FL (ref 9.2–12.9)
POTASSIUM SERPL-SCNC: 3.8 MMOL/L (ref 3.5–5.1)
PROT SERPL-MCNC: 5.2 G/DL (ref 6–8.4)
RBC # BLD AUTO: 4.06 M/UL (ref 4–5.4)
SODIUM SERPL-SCNC: 138 MMOL/L (ref 136–145)
WBC # BLD AUTO: 7.51 K/UL (ref 3.9–12.7)

## 2020-09-10 PROCEDURE — 25000003 PHARM REV CODE 250: Performed by: INTERNAL MEDICINE

## 2020-09-10 PROCEDURE — C9113 INJ PANTOPRAZOLE SODIUM, VIA: HCPCS | Performed by: INTERNAL MEDICINE

## 2020-09-10 PROCEDURE — 11000001 HC ACUTE MED/SURG PRIVATE ROOM

## 2020-09-10 PROCEDURE — 85025 COMPLETE CBC W/AUTO DIFF WBC: CPT

## 2020-09-10 PROCEDURE — 99233 PR SUBSEQUENT HOSPITAL CARE,LEVL III: ICD-10-PCS | Mod: ,,, | Performed by: HOSPITALIST

## 2020-09-10 PROCEDURE — 80053 COMPREHEN METABOLIC PANEL: CPT

## 2020-09-10 PROCEDURE — 27000221 HC OXYGEN, UP TO 24 HOURS

## 2020-09-10 PROCEDURE — 94761 N-INVAS EAR/PLS OXIMETRY MLT: CPT

## 2020-09-10 PROCEDURE — 36415 COLL VENOUS BLD VENIPUNCTURE: CPT

## 2020-09-10 PROCEDURE — 99233 SBSQ HOSP IP/OBS HIGH 50: CPT | Mod: ,,, | Performed by: HOSPITALIST

## 2020-09-10 PROCEDURE — 87040 BLOOD CULTURE FOR BACTERIA: CPT

## 2020-09-10 PROCEDURE — 63600175 PHARM REV CODE 636 W HCPCS: Performed by: HOSPITALIST

## 2020-09-10 PROCEDURE — 63600175 PHARM REV CODE 636 W HCPCS: Performed by: INTERNAL MEDICINE

## 2020-09-10 PROCEDURE — 25000003 PHARM REV CODE 250: Performed by: PHYSICIAN ASSISTANT

## 2020-09-10 RX ADMIN — TAMSULOSIN HYDROCHLORIDE 0.4 MG: 0.4 CAPSULE ORAL at 09:09

## 2020-09-10 RX ADMIN — DOCUSATE SODIUM 100 MG: 100 CAPSULE, LIQUID FILLED ORAL at 09:09

## 2020-09-10 RX ADMIN — Medication 6 MG: at 09:09

## 2020-09-10 RX ADMIN — PANTOPRAZOLE SODIUM 40 MG: 40 INJECTION, POWDER, LYOPHILIZED, FOR SOLUTION INTRAVENOUS at 09:09

## 2020-09-10 RX ADMIN — CARVEDILOL 3.12 MG: 3.12 TABLET, FILM COATED ORAL at 09:09

## 2020-09-10 RX ADMIN — CEFTRIAXONE 1 G: 1 INJECTION, SOLUTION INTRAVENOUS at 05:09

## 2020-09-10 RX ADMIN — ATORVASTATIN CALCIUM 40 MG: 20 TABLET, FILM COATED ORAL at 09:09

## 2020-09-10 NOTE — ASSESSMENT & PLAN NOTE
- hypotensive upon admission and lisinopril and Lasix held.  - Held lisinopril, Lasix, continued carvedilol 3.125 mg bid.  - monitor blood pressure.  Has been stable on carvedilol alone.

## 2020-09-10 NOTE — PROGRESS NOTES
[facility] Hospital Medicine  Progress Note    Patient Name: Gloria Zuleta  MRN: [id number]  Patient Class: IP- Inpatient   Admission Date: 9/7/2020  Length of Stay: 3 days  Attending Physician: Rachel Valdez MD  Primary Care Provider: [facility]        Subjective:     Principal Problem:Sepsis due to gram-negative UTI        HPI:  From H&P by Brianne Lehman PA:  Ms. Gloria Zuleta is a 89 y.o. female, with PMH of CAD, HFrEF, HTN, NICM, chronic sacral osteomyelitis, Stage IV sacral decubitus ulcer, Urinary incontinence, Dementia, HLD, who was admitted to [facility] ED on 9/6/20 with epigastric abdominal pain, nausea and vomiting x 1 day. ED evaluation showed a UTI with Sepsis. A CT showed renal stones bilaterally, US showed gallstones with gallbladder thickening associated with chronic cholecystitis without bile duct dilation or signs of acute infection. The patient was started on IV antibiotics, and admitted to the hospitalist service. On her first day of admission, shortly after starting ot eat dinner, she had an episode of nausea and vomiting, during which she vomited black liquid. Given concern for UGIB, she was started on IV Protonix, and H&H reassessed. She did have a drop from 16.0 to 14.1, which was favored to be 2/2 IV fluid dilution at the second CBC is her baseline HGB. Given lack of GI coverage at [facility], she was transferred to [facility] where she will be admitted to inpatient status to the ICU.     Overview/Hospital Course:  Patient was admitted to the ICU from [facility] where she was being treated for a UTI.  She had no further episodes of vomiting and expressed to the GI physician that she was hungry, so her diet was advanced and her H/H monitored closely rather than putting her through a procedure.  Noted after admission that she also had a prolapsed rectum that appeared quite irritated.  This has been evaluated by CRS in the past and is reducible.  Her urine culture grew  Proteus, and on 9/8 she also had a positive blood culture for Proteus.  She was continued on ceftriaxone to treat.  Transferred to the floor on 9/7.  ID evaluated her and recommended continue IV ceftriaxone to continue a 10 day course.  Blood culture was repeated 9/10 to ensure clearance of bacteremia.    Interval History:  No complaints.  She has had several small bowel movements today, solid.  Prolapse is reducible.    Review of Systems   Constitutional: Negative for chills and fever.   Respiratory: Negative for cough and shortness of breath.    Cardiovascular: Negative for chest pain and palpitations.     Objective:     Vital Signs (Most Recent):  Temp: 97.6 °F (36.4 °C) (09/10/20 1552)  Pulse: 87 (09/10/20 1552)  Resp: 20 (09/10/20 1552)  BP: 126/69 (09/10/20 1552)  SpO2: 95 % (09/10/20 1552) Vital Signs (24h Range):  Temp:  [97.5 °F (36.4 °C)-98.4 °F (36.9 °C)] 97.6 °F (36.4 °C)  Pulse:  [84-96] 87  Resp:  [16-20] 20  SpO2:  [94 %-95 %] 95 %  BP: (110-164)/(66-86) 126/69     Weight: 49.7 kg (109 lb 9.1 oz)  Body mass index is 22.13 kg/m².    Intake/Output Summary (Last 24 hours) at 9/10/2020 1816  Last data filed at 9/10/2020 0900  Gross per 24 hour   Intake 120 ml   Output --   Net 120 ml      Physical Exam  Constitutional:       Appearance: She is well-developed.      Comments: Frail, elderly woman in NAD, hard of hearing.   HENT:      Head: Normocephalic.   Eyes:      Conjunctiva/sclera: Conjunctivae normal.      Pupils: Pupils are equal, round, and reactive to light.   Neck:      Musculoskeletal: Neck supple.      Thyroid: No thyromegaly.   Cardiovascular:      Rate and Rhythm: Normal rate and regular rhythm.      Heart sounds: Normal heart sounds. No murmur. No friction rub. No gallop.    Pulmonary:      Effort: Pulmonary effort is normal.      Breath sounds: Normal breath sounds.   Abdominal:      General: Bowel sounds are normal. There is no distension.      Palpations: Abdomen is soft.      Tenderness:  "There is no abdominal tenderness.   Musculoskeletal: Normal range of motion.   Lymphadenopathy:      Cervical: No cervical adenopathy.   Skin:     General: Skin is warm and dry.      Findings: No rash.   Neurological:      Mental Status: She is alert.      Comments: Not oriented, believes she is at home.   Psychiatric:         Behavior: Behavior normal.         Thought Content: Thought content normal.         Significant Labs: All pertinent labs within the past 24 hours have been reviewed.    Significant Imaging: I have reviewed all pertinent imaging results/findings within the past 24 hours.      Assessment/Plan:      * Sepsis due to gram-negative UTI  - SIRS criteria for sepsis include hypotension, tachycardia, leukocytosis.  Documented UTI/bacteremia with Proteus.  - Discontinued IV fluids as she is tolerating oral fluids and BP stable.  - Continue ceftriaxone to complete 10 day course with today #3/10.  - OK to place midline and complete antibiotics in nursing home tomorrow if blood culture from today remains negative.    Hematemesis with nausea  - Patient admitted to [facility] with UTI, transferred to [facility] 2[place]2 suspected upper GI bleed with "black vomiting" after dinner at the other hospital.  - H&H did drop at time of admission, although this was felt likely to represent hemodilution on IV fluids.  - GI bleed order set initiated   - GI consulted, feel she does not have acute GI bleeding and will monitor for now, especially given bacteremia and UTI.  - PPI changed to oral, patient tolerating diet.  - H/H stable.    Urinary tract infection due to Proteus  - Resistant to Levaquin, discontinued  - Started ceftriaxone after discussing with son.  Patient's chart states she is allergic to PCN and cephalexin, but he reports she has never had a reaction that he is aware of.  Tolerating ceftriaxone without issues.  - Blood culture also grew Proteus.      Proteus septicemia  - As above, Proteus UTI and bacteremia  - Continue " ceftriaxone.  - ID recommends 10 day course antibiotics.  Probably can get SNF prior to returning to NH.    Calculus of gallbladder without cholecystitis without obstruction  - noted upon ED imaging   - no signs of acute infection or obstruction, negative sonographic Peña sign.  - monitor       Chronic combined systolic and diastolic heart failure  - last Echo with EF 25%   - appears compensated at present   - monitor I&O and daily weights   - Hold Lasix, lisinopril for now, continue carvedilol.    Decubitus ulcer of sacral region, stage 4  - wound care consulted, note this is nearly healed  - Continue wound care.    Dementia without behavioral disturbance  - delirium precautions ordered   - Likely Alzheimer's type dementia.  She is pleasant and cooperative, no evidence of behavioral issues.    Hyperlipemia  - continue statin  - last lipid panel:  Results for ETHAN BEY (MRN [id number]) as of 9/7/2020 01:32   Ref. Range 1/27/2019 05:34   Cholesterol Latest Ref Range: 120 - 199 mg/dL 128   HDL Latest Ref Range: 40 - 75 mg/dL 43   Hdl/Cholesterol Ratio Latest Ref Range: 20.0 - 50.0 % 33.6   LDL Cholesterol External Latest Ref Range: 63.0 - 159.0 mg/dL 69.8   Non-HDL Cholesterol Latest Units: mg/dL 85   Total Cholesterol/HDL Ratio Latest Ref Range: 2.0 - 5.0  3.0   Triglycerides Latest Ref Range: 30 - 150 mg/dL 76         Essential hypertension  - hypotensive upon admission and lisinopril and Lasix held.  - Held lisinopril, Lasix, continued carvedilol 3.125 mg bid.  - monitor blood pressure.  Has been stable on carvedilol alone.      CAD in native artery  - continuing statin  - holding ASA in light of UGIB         VTE Risk Mitigation (From admission, onward)         Ordered     IP VTE HIGH RISK PATIENT  Once      09/07/20 0052     Place sequential compression device  Until discontinued      09/07/20 0052     Reason for No Pharmacological VTE Prophylaxis  Once     Question:  Reasons:  Answer:  Active Bleeding     09/07/20 0052     Place MINE floyde  Until discontinued      09/07/20 0030                Discharge Planning   ALFRED: 9/11/2020     Code Status: DNR   Is the patient medically ready for discharge?:     Reason for patient still in hospital (select all that apply): Patient trending condition and Treatment  Discharge Plan A: Return to nursing home                  Rachel Hagan MD  Department of Hospital Medicine   [facility]

## 2020-09-10 NOTE — PROGRESS NOTES
[facility] Hospital Medicine  Progress Note    Patient Name: Gloria Zuleta  MRN: [id number]  Patient Class: IP- Inpatient   Admission Date: 9/7/2020  Length of Stay: 3 days  Attending Physician: Rachel Valdez MD  Primary Care Provider: [facility]        Subjective:     Principal Problem:Sepsis due to gram-negative UTI        HPI:  From H&P by Brianne Lehman PA:  Ms. Gloria Zuleta is a 89 y.o. female, with PMH of CAD, HFrEF, HTN, NICM, chronic sacral osteomyelitis, Stage IV sacral decubitus ulcer, Urinary incontinence, Dementia, HLD, who was admitted to [facility] ED on 9/6/20 with epigastric abdominal pain, nausea and vomiting x 1 day. ED evaluation showed a UTI with Sepsis. A CT showed renal stones bilaterally, US showed gallstones with gallbladder thickening associated with chronic cholecystitis without bile duct dilation or signs of acute infection. The patient was started on IV antibiotics, and admitted to the hospitalist service. On her first day of admission, shortly after starting ot eat dinner, she had an episode of nausea and vomiting, during which she vomited black liquid. Given concern for UGIB, she was started on IV Protonix, and H&H reassessed. She did have a drop from 16.0 to 14.1, which was favored to be 2/2 IV fluid dilution at the second CBC is her baseline HGB. Given lack of GI coverage at [facility], she was transferred to [facility] where she will be admitted to inpatient status to the ICU.     Overview/Hospital Course:  Patient was admitted to the ICU from [facility] where she was being treated for a UTI.  She had no further episodes of vomiting and expressed to the GI physician that she was hungry, so her diet was advanced and her H/H monitored closely rather than putting her through a procedure.  Noted after admission that she also had a prolapsed rectum that appeared quite irritated.  This has been evaluated by CRS in the past and is reducible.  Her urine culture grew  "Proteus, and on 9/8 she also had a positive blood culture for Proteus.  She was continued on ceftriaxone to treat.  Transferred to the floor on 9/7.  ID evaluated her and recommended continue IV ceftriaxone to continue a 10 day course.    No new subjective & objective note has been filed under this hospital service since the last note was generated.      Assessment/Plan:      * Sepsis due to gram-negative UTI  - SIRS criteria for sepsis include hypotension, tachycardia, leukocytosis.  Documented UTI/bacteremia with Proteus.  - Discontinued IV fluids as she is tolerating oral fluids and BP stable.  - Continue ceftriaxone to complete 10 day course with today #3/10.  - OK to place midline and complete antibiotics in nursing home if blood culture from today remains negative.    Hematemesis with nausea  - Patient admitted to [facility] with UTI, transferred to [facility] 2/2 suspected upper GI bleed with "black vomiting" after dinner at the other hospital.  - H&H did drop at time of admission, although this was felt likely to represent hemodilution on IV fluids.  - GI bleed order set initiated   - GI consulted, feel she does not have acute GI bleeding and will monitor for now, especially given bacteremia and UTI.  - PPI changed to oral, patient tolerating diet.  - H/H stable.    Urinary tract infection due to Proteus  - Resistant to Levaquin, discontinued  - Started ceftriaxone after discussing with son.  Patient's chart states she is allergic to PCN and cephalexin, but he reports she has never had a reaction that he is aware of.  Tolerating ceftriaxone without issues.  - Blood culture also grew Proteus.      Proteus septicemia  - As above, Proteus UTI and bacteremia  - Continue ceftriaxone.  - ID recommends 10 day course antibiotics.  Probably can get SNF prior to returning to NH.    Calculus of gallbladder without cholecystitis without obstruction  - noted upon ED imaging   - no signs of acute infection or obstruction, negative " sonographic Peña sign.  - monitor       Chronic combined systolic and diastolic heart failure  - last Echo with EF 25%   - appears compensated at present   - monitor I&O and daily weights   - Hold Lasix, lisinopril for now, continue carvedilol.    Decubitus ulcer of sacral region, stage 4  - wound care consulted, note this is nearly healed  - Continue wound care.    Dementia without behavioral disturbance  - delirium precautions ordered   - Likely Alzheimer's type dementia.  She is pleasant and cooperative, no evidence of behavioral issues.    Hyperlipemia  - continue statin  - last lipid panel:  Results for ETHAN BEY (MRN [id number]) as of 9/7/2020 01:32   Ref. Range 1/27/2019 05:34   Cholesterol Latest Ref Range: 120 - 199 mg/dL 128   HDL Latest Ref Range: 40 - 75 mg/dL 43   Hdl/Cholesterol Ratio Latest Ref Range: 20.0 - 50.0 % 33.6   LDL Cholesterol External Latest Ref Range: 63.0 - 159.0 mg/dL 69.8   Non-HDL Cholesterol Latest Units: mg/dL 85   Total Cholesterol/HDL Ratio Latest Ref Range: 2.0 - 5.0  3.0   Triglycerides Latest Ref Range: 30 - 150 mg/dL 76         Essential hypertension  - hypotensive upon admission and lisinopril and Lasix held.  - Held lisinopril, Lasix, continued carvedilol 3.125 mg bid.  - monitor blood pressure.  Has been stable on carvedilol alone.      CAD in native artery  - continuing statin  - holding ASA in light of UGIB         VTE Risk Mitigation (From admission, onward)         Ordered     IP VTE HIGH RISK PATIENT  Once      09/07/20 0052     Place sequential compression device  Until discontinued      09/07/20 0052     Reason for No Pharmacological VTE Prophylaxis  Once     Question:  Reasons:  Answer:  Active Bleeding    09/07/20 0052     Place MINE hose  Until discontinued      09/07/20 0030                Discharge Planning   ALFRED: 9/11/2020     Code Status: DNR   Is the patient medically ready for discharge?:     Reason for patient still in hospital (select all that  apply): Patient trending condition and Treatment  Discharge Plan A: Return to nursing home                  Rachel Hagan MD  Department of Hospital Medicine   [facility]

## 2020-09-10 NOTE — ASSESSMENT & PLAN NOTE
- Resistant to Levaquin, discontinued  - Started ceftriaxone after discussing with son.  Patient's chart states she is allergic to PCN and cephalexin, but he reports she has never had a reaction that he is aware of.  Tolerating ceftriaxone without issues.  - Blood culture also grew Proteus.

## 2020-09-10 NOTE — ASSESSMENT & PLAN NOTE
- SIRS criteria for sepsis include hypotension, tachycardia, leukocytosis.  Documented UTI/bacteremia with Proteus.  - Discontinued IV fluids as she is tolerating oral fluids and BP stable.  - Continue ceftriaxone to complete 10 day course with today #3/10.  - OK to place midline and complete antibiotics in nursing home tomorrow if blood culture from today remains negative.

## 2020-09-10 NOTE — SUBJECTIVE & OBJECTIVE
Interval History:  No complaints.  She has had several small bowel movements today, solid.  Prolapse is reducible.    Review of Systems   Constitutional: Negative for chills and fever.   Respiratory: Negative for cough and shortness of breath.    Cardiovascular: Negative for chest pain and palpitations.     Objective:     Vital Signs (Most Recent):  Temp: 97.6 °F (36.4 °C) (09/10/20 1552)  Pulse: 87 (09/10/20 1552)  Resp: 20 (09/10/20 1552)  BP: 126/69 (09/10/20 1552)  SpO2: 95 % (09/10/20 1552) Vital Signs (24h Range):  Temp:  [97.5 °F (36.4 °C)-98.4 °F (36.9 °C)] 97.6 °F (36.4 °C)  Pulse:  [84-96] 87  Resp:  [16-20] 20  SpO2:  [94 %-95 %] 95 %  BP: (110-164)/(66-86) 126/69     Weight: 49.7 kg (109 lb 9.1 oz)  Body mass index is 22.13 kg/m².    Intake/Output Summary (Last 24 hours) at 9/10/2020 1816  Last data filed at 9/10/2020 0900  Gross per 24 hour   Intake 120 ml   Output --   Net 120 ml      Physical Exam  Constitutional:       Appearance: She is well-developed.      Comments: Frail, elderly woman in NAD, hard of hearing.   HENT:      Head: Normocephalic.   Eyes:      Conjunctiva/sclera: Conjunctivae normal.      Pupils: Pupils are equal, round, and reactive to light.   Neck:      Musculoskeletal: Neck supple.      Thyroid: No thyromegaly.   Cardiovascular:      Rate and Rhythm: Normal rate and regular rhythm.      Heart sounds: Normal heart sounds. No murmur. No friction rub. No gallop.    Pulmonary:      Effort: Pulmonary effort is normal.      Breath sounds: Normal breath sounds.   Abdominal:      General: Bowel sounds are normal. There is no distension.      Palpations: Abdomen is soft.      Tenderness: There is no abdominal tenderness.   Musculoskeletal: Normal range of motion.   Lymphadenopathy:      Cervical: No cervical adenopathy.   Skin:     General: Skin is warm and dry.      Findings: No rash.   Neurological:      Mental Status: She is alert.      Comments: Not oriented, believes she is at home.    Psychiatric:         Behavior: Behavior normal.         Thought Content: Thought content normal.         Significant Labs: All pertinent labs within the past 24 hours have been reviewed.    Significant Imaging: I have reviewed all pertinent imaging results/findings within the past 24 hours.

## 2020-09-10 NOTE — ASSESSMENT & PLAN NOTE
- As above, Proteus UTI and bacteremia  - Continue ceftriaxone.  - ID recommends 10 day course antibiotics.  Probably can get SNF prior to returning to NH.

## 2020-09-10 NOTE — ASSESSMENT & PLAN NOTE
"- Patient admitted to [facility] with UTI, [place] [facility] [place] suspected upper GI bleed with "black vomiting" after dinner at the other hospital.  - H&H did drop at time of admission, although this was felt likely to represent hemodilution on IV fluids.  - GI bleed order set initiated   - GI consulted, feel she does not have acute GI bleeding and will monitor for now, especially given bacteremia and UTI.  - PPI changed to oral, patient tolerating diet.  - H/H stable.  "

## 2020-09-10 NOTE — PLAN OF CARE
POC reviewed with patient. Patient free from falls or injury throughout shift. Several request to be placed on bed pan. Patient had really small bowel movements. No significant events overnight. All safety measures in place. Will continue to monitor.

## 2020-09-10 NOTE — PLAN OF CARE
Discharge Planning Assessment:    Patient admitted on 9-7-20  Chart reviewed, Care plan discussed with treatment team,  attending Dr Valdez   PCP updated in Epic: at Nursing Home   Pharmacy, updated in Epic: at Nursing Home   DME at home: at Nursing Home  Current dispo: anticipate that Mrs Zuleta will be medically cleared tomorrow  Updated clinicals fwd to [facility] today  GI, ID and wound care following   Case management to follow       09/10/20 2069   Discharge Reassessment   Assessment Type Discharge Planning Reassessment   Provided patient/caregiver education on the expected discharge date and the discharge plan Yes   Do you have any problems affording any of your prescribed medications? No   Discharge Plan A Return to nursing home   Patient choice form signed by patient/caregiver N/A

## 2020-09-10 NOTE — ASSESSMENT & PLAN NOTE
- SIRS criteria for sepsis include hypotension, tachycardia, leukocytosis.  Documented UTI/bacteremia with Proteus.  - Discontinued IV fluids as she is tolerating oral fluids and BP stable.  - Continue ceftriaxone to complete 10 day course with today #3/10.  - OK to place midline and complete antibiotics in nursing home if blood culture from today remains negative.

## 2020-09-10 NOTE — ASSESSMENT & PLAN NOTE
"- Patient admitted to [facility] with UTI, transferred to [facility] 2/2 suspected upper GI bleed with "black vomiting" after dinner at the other hospital.  - H&H did drop at time of admission, although this was felt likely to represent hemodilution on IV fluids.  - GI bleed order set initiated   - GI consulted, feel she does not have acute GI bleeding and will monitor for now, especially given bacteremia and UTI.  - PPI changed to oral, patient tolerating diet.  - H/H stable.  "

## 2020-09-11 VITALS
OXYGEN SATURATION: 97 % | HEIGHT: 59 IN | DIASTOLIC BLOOD PRESSURE: 64 MMHG | BODY MASS INDEX: 22.09 KG/M2 | SYSTOLIC BLOOD PRESSURE: 126 MMHG | RESPIRATION RATE: 18 BRPM | HEART RATE: 79 BPM | TEMPERATURE: 98 F | WEIGHT: 109.56 LBS

## 2020-09-11 PROBLEM — K92.0 HEMATEMESIS WITH NAUSEA: Status: RESOLVED | Noted: 2020-09-07 | Resolved: 2020-09-11

## 2020-09-11 PROBLEM — L89.154 DECUBITUS ULCER OF SACRAL REGION, STAGE 4: Status: RESOLVED | Noted: 2017-03-14 | Resolved: 2020-09-11

## 2020-09-11 LAB
ALBUMIN SERPL BCP-MCNC: 2.2 G/DL (ref 3.5–5.2)
ALP SERPL-CCNC: 85 U/L (ref 55–135)
ALT SERPL W/O P-5'-P-CCNC: 36 U/L (ref 10–44)
ANION GAP SERPL CALC-SCNC: 9 MMOL/L (ref 8–16)
AST SERPL-CCNC: 41 U/L (ref 10–40)
BASOPHILS # BLD AUTO: 0.01 K/UL (ref 0–0.2)
BASOPHILS NFR BLD: 0.1 % (ref 0–1.9)
BILIRUB SERPL-MCNC: 0.5 MG/DL (ref 0.1–1)
BUN SERPL-MCNC: 18 MG/DL (ref 10–30)
CALCIUM SERPL-MCNC: 9.1 MG/DL (ref 8.7–10.5)
CHLORIDE SERPL-SCNC: 106 MMOL/L (ref 95–110)
CO2 SERPL-SCNC: 22 MMOL/L (ref 23–29)
CREAT SERPL-MCNC: 0.6 MG/DL (ref 0.5–1.4)
DIFFERENTIAL METHOD: ABNORMAL
EOSINOPHIL # BLD AUTO: 0.1 K/UL (ref 0–0.5)
EOSINOPHIL NFR BLD: 1.2 % (ref 0–8)
ERYTHROCYTE [DISTWIDTH] IN BLOOD BY AUTOMATED COUNT: 14.8 % (ref 11.5–14.5)
EST. GFR  (AFRICAN AMERICAN): >60 ML/MIN/1.73 M^2
EST. GFR  (NON AFRICAN AMERICAN): >60 ML/MIN/1.73 M^2
GLUCOSE SERPL-MCNC: 92 MG/DL (ref 70–110)
HCT VFR BLD AUTO: 34.9 % (ref 37–48.5)
HGB BLD-MCNC: 10.9 G/DL (ref 12–16)
IMM GRANULOCYTES # BLD AUTO: 0.03 K/UL (ref 0–0.04)
IMM GRANULOCYTES NFR BLD AUTO: 0.4 % (ref 0–0.5)
LYMPHOCYTES # BLD AUTO: 0.9 K/UL (ref 1–4.8)
LYMPHOCYTES NFR BLD: 11.8 % (ref 18–48)
MCH RBC QN AUTO: 29.5 PG (ref 27–31)
MCHC RBC AUTO-ENTMCNC: 31.2 G/DL (ref 32–36)
MCV RBC AUTO: 94 FL (ref 82–98)
MONOCYTES # BLD AUTO: 0.6 K/UL (ref 0.3–1)
MONOCYTES NFR BLD: 8.1 % (ref 4–15)
NEUTROPHILS # BLD AUTO: 6.1 K/UL (ref 1.8–7.7)
NEUTROPHILS NFR BLD: 78.4 % (ref 38–73)
NRBC BLD-RTO: 0 /100 WBC
PLATELET # BLD AUTO: 123 K/UL (ref 150–350)
PMV BLD AUTO: 9.8 FL (ref 9.2–12.9)
POTASSIUM SERPL-SCNC: 3.8 MMOL/L (ref 3.5–5.1)
PROT SERPL-MCNC: 4.9 G/DL (ref 6–8.4)
RBC # BLD AUTO: 3.7 M/UL (ref 4–5.4)
SODIUM SERPL-SCNC: 137 MMOL/L (ref 136–145)
WBC # BLD AUTO: 7.8 K/UL (ref 3.9–12.7)

## 2020-09-11 PROCEDURE — 76937 US GUIDE VASCULAR ACCESS: CPT

## 2020-09-11 PROCEDURE — 25000003 PHARM REV CODE 250: Performed by: INTERNAL MEDICINE

## 2020-09-11 PROCEDURE — 99238 HOSP IP/OBS DSCHRG MGMT 30/<: CPT | Mod: ,,, | Performed by: HOSPITALIST

## 2020-09-11 PROCEDURE — 25000003 PHARM REV CODE 250: Performed by: HOSPITALIST

## 2020-09-11 PROCEDURE — 94761 N-INVAS EAR/PLS OXIMETRY MLT: CPT

## 2020-09-11 PROCEDURE — 36415 COLL VENOUS BLD VENIPUNCTURE: CPT

## 2020-09-11 PROCEDURE — C1751 CATH, INF, PER/CENT/MIDLINE: HCPCS

## 2020-09-11 PROCEDURE — 80053 COMPREHEN METABOLIC PANEL: CPT

## 2020-09-11 PROCEDURE — 85025 COMPLETE CBC W/AUTO DIFF WBC: CPT

## 2020-09-11 PROCEDURE — 36410 VNPNXR 3YR/> PHY/QHP DX/THER: CPT

## 2020-09-11 PROCEDURE — 27000221 HC OXYGEN, UP TO 24 HOURS

## 2020-09-11 PROCEDURE — 99238 PR HOSPITAL DISCHARGE DAY,<30 MIN: ICD-10-PCS | Mod: ,,, | Performed by: HOSPITALIST

## 2020-09-11 RX ORDER — TRAZODONE HYDROCHLORIDE 50 MG/1
50 TABLET ORAL NIGHTLY
Status: ON HOLD | COMMUNITY
End: 2020-09-11 | Stop reason: HOSPADM

## 2020-09-11 RX ORDER — PANTOPRAZOLE SODIUM 40 MG/1
40 TABLET, DELAYED RELEASE ORAL DAILY
Status: ON HOLD
Start: 2020-09-11 | End: 2021-04-20

## 2020-09-11 RX ORDER — PANTOPRAZOLE SODIUM 40 MG/1
40 TABLET, DELAYED RELEASE ORAL DAILY
Status: DISCONTINUED | OUTPATIENT
Start: 2020-09-11 | End: 2020-09-11 | Stop reason: HOSPADM

## 2020-09-11 RX ORDER — TALC
6 POWDER (GRAM) TOPICAL NIGHTLY PRN
Refills: 0 | Status: ON HOLD | COMMUNITY
Start: 2020-09-11 | End: 2021-04-20 | Stop reason: SDUPTHER

## 2020-09-11 RX ORDER — PREDNISOLONE ACETATE 10 MG/ML
1 SUSPENSION/ DROPS OPHTHALMIC 2 TIMES DAILY
Status: ON HOLD | COMMUNITY
End: 2021-04-20 | Stop reason: SDUPTHER

## 2020-09-11 RX ORDER — POLYETHYLENE GLYCOL 3350 17 G/17G
17 POWDER, FOR SOLUTION ORAL 2 TIMES DAILY
Status: ON HOLD
Start: 2020-09-11 | End: 2021-04-20 | Stop reason: SDUPTHER

## 2020-09-11 RX ADMIN — CARVEDILOL 3.12 MG: 3.12 TABLET, FILM COATED ORAL at 10:09

## 2020-09-11 RX ADMIN — ATORVASTATIN CALCIUM 40 MG: 20 TABLET, FILM COATED ORAL at 10:09

## 2020-09-11 RX ADMIN — DOCUSATE SODIUM 100 MG: 100 CAPSULE, LIQUID FILLED ORAL at 10:09

## 2020-09-11 RX ADMIN — PANTOPRAZOLE SODIUM 40 MG: 40 TABLET, DELAYED RELEASE ORAL at 10:09

## 2020-09-11 RX ADMIN — POLYETHYLENE GLYCOL 3350 17 G: 17 POWDER, FOR SOLUTION ORAL at 10:09

## 2020-09-11 RX ADMIN — TAMSULOSIN HYDROCHLORIDE 0.4 MG: 0.4 CAPSULE ORAL at 10:09

## 2020-09-11 NOTE — ASSESSMENT & PLAN NOTE
- Noted on presentation.  Had surgical evaluation as outpatient, nothing to be done.  - Continue local care, bowel regimen.  Prolapse is reducible.

## 2020-09-11 NOTE — PLAN OF CARE
POC reviewed w pt at the bedside. Pt oriented to person and place, VSS on 2L NC. Pt did not complain of pain during shift. No injuries or falls noted during shift. Pt had an incontinent episode during the shift, laxative/stool softener was held due to the pt having 8-9 stools during day shift. PRN melatonin given to help pt sleep. Safety precautions in place, call bell within reach, side rails up X2, will continue to monitor.

## 2020-09-11 NOTE — PLAN OF CARE
Patient is a long term NH resident at [facility].     Pt will need IV antibiotics (1 gram Rocephin) until 9/17/20.  Midline placed today.     Spoke with Annika in admissions at [facility] (ph [phone]) and orders sent in [facility].  They have submitted to [place] for SNF auth but state that since pt is a resident there they can likely take pt back before they receive official SNF auth.  They are reviewing orders, awaiting call back with what else they will need from [facility] in order to accept pt back and timeline on when pt can return.     CM to continue to follow.

## 2020-09-11 NOTE — PROGRESS NOTES
Pt readied for discharge.  Pt assessed and found to be in stable condition.  Personal brief put on pt.  Peripheral IV removed.  Midline CDI.  AVS printed and given to [employer] drivers.  Pt's belongings packed.  Upper and lower dentures in place.  Pt escorted from unit by [employer] via stretcher.

## 2020-09-11 NOTE — PLAN OF CARE
[facility]     Department of Hospital Medicine     [address] [place] [address]     [phone] after hours  [phone] fax       NURSING HOME ORDERS    09/11/2020    Admit to Nursing Home:    Skilled Bed                                                  Diagnoses:  Active Hospital Problems    Diagnosis  POA    *Sepsis due to gram-negative UTI [A41.50, N39.0]  Yes     Priority: 1 - High    Urinary tract infection due to Proteus [N39.0, B96.4]  Yes     Priority: 3     Proteus septicemia [A41.59]  Yes     Priority: 4     Calculus of gallbladder without cholecystitis without obstruction [K80.20]  Yes    Chronic combined systolic and diastolic heart failure [I50.42]  Yes    Dementia without behavioral disturbance [F03.90]  Yes    Partial rectal prolapse [K62.3]  Yes    Essential hypertension [I10]  Yes      Resolved Hospital Problems    Diagnosis Date Resolved POA    Hematemesis with nausea [K92.0] 09/11/2020 Yes     Priority: 2     Decubitus ulcer of sacral region, stage 4 [L89.154] 09/11/2020 Yes    SVEN (acute kidney injury) [N17.9] 09/08/2020 Yes         Allergies:  Review of patient's allergies indicates:   Allergen Reactions    Bactrim [sulfamethoxazole-trimethoprim]     Codeine     Cortisone     Keflex [cephalexin]     Pcn [penicillins]     Sulfadiazine     Sumycin 250     Contrast media Other (See Comments)     unknown    Donnatal [phenobarb-hyoscy-atropine-scop] Other (See Comments)     Unknown     Restoril [temazepam] Other (See Comments)     unknown       Vitals:       Every shift (Skilled Nursing patients)    Diet: Cardiac   Supplement:  1 can every three times a day with meals                         Type:  House        Activities:   As tolerated     LABS:  Per facility protocol    Nursing Precautions:     - Aspiration precautions:             - Total assistance with meals            -  Upright 90 degrees befor during and after  meals             -  Suction at bedside          - Fall precautions per nursing home protocol   - Seizure precaution per USP protocol   - Decubitus precautions:        -  for positioning   - Pressure reducing foam mattress   - Turn patient every two hours. Use wedge pillows to anchor patient    CONSULTS:      Physical Therapy to evaluate and treat     Occupational Therapy to evaluate and treat     Nutrition to evaluate and recommend diet    WOUND CARE:  Healing stage 4 pressure injury to sacrum:  After perineal care, pat dry, apply a thick coat of Triad paste and cover with a silicone bordered foam dressing.  Change every 2 days or sooner if it becomes soiled.    Infusion Therapy:   SN to perform Infusion Therapy/Central Line Care.  Review Central Line Care & Central Line Flush with patient.    IV Access:  LUE midline  Administer (drug and dose): Rocephin 1 gram daily     Stop Date: 9/17/20    Scrub the Hub: Prior to accessing the line, always perform a 30 second alcohol scrub  Each lumen of the central line is to be flushed at least daily with 10 mL Normal Saline and 3 mL Heparin flush (10 units/mL)  Skilled Nurse (SN) may draw blood from IV access  Blood Draw Procedure:   - Aspirate at least 5 mL of blood   - Discard   - Obtain specimen   - Change injection cap   - Flush with 20 mL Normal Saline followed by a                 3-5 mL Heparin flush (10 units/mL)  Central :   - Sterile dressing changes are done weekly and as needed.   - Use chlor-hexadine scrub to cleanse site, apply Biopatch to insertion site,       apply securement device dressing   - Injection caps are changed weekly and after EVERY lab draw.   - If sterile gauze is under dressing to control oozing,                 dressing change must be performed every 24 hours until gauze is not needed.      Medications: Discontinue all previous medication orders, if any. See new list below.   Gloria Zuleta   Home Medication  Instructions KAZ:[id number]    Printed on:09/11/20 6805   Medication Information                      ascorbic acid, vitamin C, (VITAMIN C) 500 MG tablet  Take 500 mg by mouth once daily.             atorvastatin (LIPITOR) 40 MG tablet  Take 1 tablet (40 mg total) by mouth once daily.             carvedilol (COREG) 3.125 MG tablet  Take 1 tablet (3.125 mg total) by mouth 2 (two) times daily with meals.             cefTRIAXone (ROCEPHIN) 1 g/50 mL PgBk IVPB  Inject 50 mLs (1 g total) into the vein once daily. for 6 days through 9/17/20             melatonin (MELATIN) 3 mg tablet  Take 2 tablets (6 mg total) by mouth nightly as needed for Insomnia.             olopatadine (PAZEO) 0.7 % Drop  Place 1 drop into both eyes once daily.             pantoprazole (PROTONIX) 40 MG tablet  Take 1 tablet (40 mg total) by mouth once daily.             polyethylene glycol (GLYCOLAX) 17 gram PwPk  Take 17 g by mouth 2 (two) times daily. Hold if diarrhea develops             prednisoLONE acetate (PRED FORTE) 1 % DrpS  Place 1 drop into both eyes 2 (two) times daily.             tamsulosin (FLOMAX) 0.4 mg Cap  Take 1 capsule (0.4 mg total) by mouth once daily.                 _________________________________  Rachel Hagan MD  09/11/2020

## 2020-09-11 NOTE — ASSESSMENT & PLAN NOTE
- SIRS criteria for sepsis include hypotension, tachycardia, leukocytosis.  Documented UTI/bacteremia with Proteus.  - Discontinued IV fluids as she is tolerating oral fluids and BP stable.  - Continue ceftriaxone to complete 10 day course with today #4/10.  - OK to place midline and complete antibiotics in nursing home if blood culture from 9/10 shows no growth today.

## 2020-09-11 NOTE — PLAN OF CARE
Spoke with pt at bedside and son, Manjinder [phone].  They are agreeable to return to [facility] today.      Pt will need IV antibiotics until 9/17/20.  [facility] can take their resident back today and provide IV antibiotics - [employer] auth for SNF has been requested.  Orders sent and referral completed in [facility].      Spoke with Annika at [facility] [phone].  They can only provide WC transport and nursing states pt is too weak for wheelchair will need stretcher.  NH states [facility] would have to order ambulance.  ADT30 placed, transport requested for 1530. For transportation issues please call the patient flow center [phone] - option 5.    Nursing to call report to akhtar 2 nurse (Room 219)  [phone].      No further DC needs from  perspective.       09/11/20 1414   Final Note   Assessment Type Final Discharge Note   Anticipated Discharge Disposition SNF   What phone number can be called within the next 1-3 days to see how you are doing after discharge? [phone]   Hospital Follow Up  Appt(s) scheduled? Yes   Discharge plans and expectations educations in teach back method with documentation complete? Yes   Right Care Referral Info   Post Acute Recommendation SNF / Sub-Acute Rehab   Post-Acute Status   Post-Acute Authorization Placement   Post-Acute Placement Status Set-up Complete

## 2020-09-11 NOTE — PLAN OF CARE
POC reviewed with patient and son at bedside. Continues on IV antibiotics via patent peripheral IV. Noted with frequent small BM's this shift; uses bedpan with staff assist. Voiding easily. Wound care provided per order to sacral wound. Denies pain, discomfort. Appetite fair for meals and fluids encouraged. Avasys camera in place for fall prevention. All safety precautions in place, call bell in reach. Hourly rounding completed.

## 2020-09-11 NOTE — PROGRESS NOTES
Wound Care follow up for sacral wound. No picture taken today. Patient lying in bed with eyes closed, fan In her hands.     Sacral wound was covered with a dressing, which was gently pulled back to visualize. Wound continues to heal. Wound Care to continue to follow for any further needs.    Jenifer Jett RN, CWOCN

## 2020-09-11 NOTE — PLAN OF CARE
Infusion Therapy:   SN to perform Infusion Therapy/Central Line Care.  Review Central Line Care & Central Line Flush with patient.    IV Access:  LUE midline  Administer (drug and dose): Rocephin 1 gram daily     Stop Date: 9/17/20    Scrub the Hub: Prior to accessing the line, always perform a 30 second alcohol scrub  Each lumen of the central line is to be flushed at least daily with 10 mL Normal Saline and 3 mL Heparin flush (10 units/mL)  Skilled Nurse (SN) may draw blood from IV access  Blood Draw Procedure:   - Aspirate at least 5 mL of blood   - Discard   - Obtain specimen   - Change injection cap   - Flush with 20 mL Normal Saline followed by a                 3-5 mL Heparin flush (10 units/mL)  Central :   - Sterile dressing changes are done weekly and as needed.   - Use chlor-hexadine scrub to cleanse site, apply Biopatch to insertion site,       apply securement device dressing   - Injection caps are changed weekly and after EVERY lab draw.   - If sterile gauze is under dressing to control oozing,                 dressing change must be performed every 24 hours until gauze is not needed.

## 2020-09-12 NOTE — DISCHARGE SUMMARY
[facility] Hospital Medicine  Discharge Summary      Patient Name: Gloria Zuleta  MRN: [id number]  Admission Date: 9/7/2020  Hospital Length of Stay: 4 days  Discharge Date and Time: 9/11/2020  4:45 PM  Attending Physician: No att. providers found   Discharging Provider: Rachel Hagan MD  Primary Care Provider: [facility]      HPI:   From H&P by Brianne Lehman PA:  Ms. Gloria Zuleta is a 89 y.o. female, with PMH of CAD, HFrEF, HTN, NICM, chronic sacral osteomyelitis, Stage IV sacral decubitus ulcer, Urinary incontinence, Dementia, HLD, who was admitted to [facility] ED on 9/6/20 with epigastric abdominal pain, nausea and vomiting x 1 day. ED evaluation showed a UTI with Sepsis. A CT showed renal stones bilaterally, US showed gallstones with gallbladder thickening associated with chronic cholecystitis without bile duct dilation or signs of acute infection. The patient was started on IV antibiotics, and admitted to the hospitalist service. On her first day of admission, shortly after starting ot eat dinner, she had an episode of nausea and vomiting, during which she vomited black liquid. Given concern for UGIB, she was started on IV Protonix, and H&H reassessed. She did have a drop from 16.0 to 14.1, which was favored to be 2/2 IV fluid dilution at the second CBC is her baseline HGB. Given lack of GI coverage at [facility], she was transferred to [facility] where she will be admitted to inpatient status to the ICU.           Hospital Course:   Patient was admitted to the ICU from [facility] where she was being treated for a UTI.  She had no further episodes of vomiting and expressed to the GI physician that she was hungry, so her diet was advanced and her H/H monitored closely rather than putting her through a procedure.  H/H remained stable and she had no further active bleeding.  Noted after admission that she also had a prolapsed rectum that appeared quite irritated.  This has been evaluated by CRS  in the past and is reducible.  Her urine culture grew Proteus, and on 9/8 she also had a positive blood culture for Proteus.  She was continued on ceftriaxone to treat.  Transferred to the floor on 9/7.  ID evaluated her and recommended continuing IV ceftriaxone to continue a 10 day course.  Blood culture was repeated 9/10 to ensure clearance of bacteremia and when it was negative a midline was placed to complete her course of antibiotics in SNF prior to [facility].  She was feeling well on discharge.     Consults:   Consults (From admission, onward)        Status Ordering Provider     Inpatient consult to Gastroenterology  Once     Provider:  Tico Vincent MD    Completed SAMARIA HUERTAS     Inpatient consult to Infectious Diseases  Once     Provider:  Lamberto Irwin MD    Completed NOAM VÁZQUEZ     IP consult to case management  Once     Provider:  (Not yet assigned)    Completed MILADYS GARCIA          * Sepsis due to gram-negative UTI  - SIRS criteria for sepsis include hypotension, tachycardia, leukocytosis.  Documented UTI/bacteremia with Proteus.  - Discontinued IV fluids as she is tolerating oral fluids and BP stable.  - Continue ceftriaxone to complete 10 day course with today #4/10.  - OK to place midline and complete antibiotics in nursing home if blood culture from 9/10 shows no growth today.    Urinary tract infection due to Proteus  - Resistant to Levaquin, discontinued  - Started ceftriaxone after discussing with son.  Patient's chart states she is allergic to PCN and cephalexin, but he reports she has never had a reaction that he is aware of.  Tolerating ceftriaxone without issues.  - Blood culture also grew Proteus.      Proteus septicemia  - As above, Proteus UTI and bacteremia  - Continue ceftriaxone.  - ID recommends 10 day course antibiotics.  Probably can get SNF prior to returning to NH.    Chronic combined systolic and diastolic heart failure  - last  Echo with EF 25%   - appears compensated at present   - monitor I&O and daily weights   - Hold Lasix, lisinopril for now, continue carvedilol.    Partial rectal prolapse  - Noted on presentation.  Had surgical evaluation as outpatient, nothing to be done.  - Continue local care, bowel regimen.  Prolapse is reducible.      Dementia without behavioral disturbance  - delirium precautions ordered   - Likely Alzheimer's type dementia.  She is pleasant and cooperative, no evidence of behavioral issues.    Essential hypertension  - hypotensive upon admission and lisinopril and Lasix held.  - Held lisinopril, Lasix, continued carvedilol 3.125 mg bid.  - monitor blood pressure.  Has been stable on carvedilol alone.        Final Active Diagnoses:    Diagnosis Date Noted POA    PRINCIPAL PROBLEM:  Sepsis due to gram-negative UTI [A41.50, N39.0] 09/06/2020 Yes    Urinary tract infection due to Proteus [N39.0, B96.4] 09/08/2020 Yes    Proteus septicemia [A41.59] 09/08/2020 Yes    Calculus of gallbladder without cholecystitis without obstruction [K80.20] 09/06/2020 Yes    Chronic combined systolic and diastolic heart failure [I50.42]  Yes    Dementia without behavioral disturbance [F03.90] 02/22/2017 Yes    Partial rectal prolapse [K62.3] 02/22/2017 Yes    Essential hypertension [I10] 11/22/2016 Yes      Problems Resolved During this Admission:    Diagnosis Date Noted Date Resolved POA    Hematemesis with nausea [K92.0] 09/07/2020 09/11/2020 Yes    Decubitus ulcer of sacral region, stage 4 [L89.154] 03/14/2017 09/11/2020 Yes    SVEN (acute kidney injury) [N17.9] 02/22/2017 09/08/2020 Yes       Discharged Condition: stable    Disposition: Skilled Nursing Facility    Follow Up:  Follow-up Information     Go to [facility].    Specialties: Nursing Home Agency, SNF Agency  Why: for nursing home services  Contact information:  [address]  [facility] [address] [phone]                 Patient Instructions:       Activity as tolerated       Medications:  Reconciled Home Medications:      Medication List      START taking these medications    cefTRIAXone 1 g/50 mL Pgbk IVPB  Commonly known as: ROCEPHIN  Inject 50 mLs (1 g total) into the vein once daily. for 6 days     melatonin 3 mg tablet  Commonly known as: MELATIN  Take 2 tablets (6 mg total) by mouth nightly as needed for Insomnia.     pantoprazole 40 MG tablet  Commonly known as: PROTONIX  Take 1 tablet (40 mg total) by mouth once daily.     polyethylene glycol 17 gram Pwpk  Commonly known as: GLYCOLAX  Take 17 g by mouth 2 (two) times daily. Hold if diarrhea develops        CONTINUE taking these medications    atorvastatin 40 MG tablet  Commonly known as: LIPITOR  Take 1 tablet (40 mg total) by mouth once daily.     carvediloL 3.125 MG tablet  Commonly known as: COREG  Take 1 tablet (3.125 mg total) by mouth 2 (two) times daily with meals.     PAZEO 0.7 % Drop  Generic drug: olopatadine  Place 1 drop into both eyes once daily.     prednisoLONE acetate 1 % Drps  Commonly known as: PRED FORTE  Place 1 drop into both eyes 2 (two) times daily.     tamsulosin 0.4 mg Cap  Commonly known as: FLOMAX  Take 1 capsule (0.4 mg total) by mouth once daily.     VITAMIN C 500 MG tablet  Generic drug: ascorbic acid (vitamin C)  Take 500 mg by mouth once daily.        STOP taking these medications    aspirin 81 MG Chew     CENTRUM 3,500-18-0.4 unit-mg-mg Chew  Generic drug: multivit-iron-min-folic acid     collagenase ointment  Commonly known as: SANTYL     escitalopram oxalate 10 MG tablet  Commonly known as: LEXAPRO     furosemide 40 MG tablet  Commonly known as: LASIX     lisinopriL 5 MG tablet  Commonly known as: PRINIVIL,ZESTRIL     nitroglycerin 0.6 MG Subl  Commonly known as: NITROSTAT     tolterodine 4 MG 24 hr capsule  Commonly known as: DETROL LA     traZODone 50 MG tablet  Commonly known as: DESYREL            Time spent on the discharge of patient: <30 minutes  Patient was  seen and examined on the date of discharge and determined to be suitable for discharge.         Rachel Hagan MD  Department of Hospital Medicine  [facility]

## 2020-09-15 LAB — BACTERIA BLD CULT: NORMAL

## 2021-01-19 PROBLEM — R07.9 CHEST PAIN: Status: RESOLVED | Noted: 2018-06-25 | Resolved: 2021-01-19

## 2021-03-08 ENCOUNTER — TELEPHONE (OUTPATIENT)
Dept: FAMILY MEDICINE | Facility: CLINIC | Age: 86
End: 2021-03-08

## 2021-03-22 ENCOUNTER — HOSPITAL ENCOUNTER (OUTPATIENT)
Dept: WOUND CARE | Facility: HOSPITAL | Age: 86
Discharge: HOME | End: 2021-03-22
Attending: PREVENTIVE MEDICINE
Payer: MEDICARE

## 2021-03-22 VITALS
TEMPERATURE: 97 F | BODY MASS INDEX: 19.35 KG/M2 | HEIGHT: 59 IN | WEIGHT: 96 LBS | SYSTOLIC BLOOD PRESSURE: 115 MMHG | HEART RATE: 70 BPM | DIASTOLIC BLOOD PRESSURE: 54 MMHG

## 2021-03-22 DIAGNOSIS — L89.153 PRESSURE ULCER OF SACRAL REGION, STAGE 3: Primary | ICD-10-CM

## 2021-03-22 DIAGNOSIS — I25.10 CAD IN NATIVE ARTERY: ICD-10-CM

## 2021-03-22 DIAGNOSIS — R53.81 DEBILITY: ICD-10-CM

## 2021-03-22 DIAGNOSIS — I10 ESSENTIAL HYPERTENSION: ICD-10-CM

## 2021-03-22 PROCEDURE — 99204 OFFICE O/P NEW MOD 45 MIN: CPT

## 2021-03-26 PROBLEM — L89.153 PRESSURE ULCER OF SACRAL REGION, STAGE 3: Status: ACTIVE | Noted: 2018-06-27

## 2021-03-31 ENCOUNTER — TELEPHONE (OUTPATIENT)
Dept: FAMILY MEDICINE | Facility: CLINIC | Age: 86
End: 2021-03-31

## 2021-04-05 ENCOUNTER — OFFICE VISIT (OUTPATIENT)
Dept: FAMILY MEDICINE | Facility: CLINIC | Age: 86
End: 2021-04-05
Payer: MEDICARE

## 2021-04-05 DIAGNOSIS — I10 ESSENTIAL HYPERTENSION: ICD-10-CM

## 2021-04-05 DIAGNOSIS — L89.153 PRESSURE ULCER OF SACRAL REGION, STAGE 3: ICD-10-CM

## 2021-04-05 PROCEDURE — 1157F ADVNC CARE PLAN IN RCRD: CPT | Mod: 95,,, | Performed by: FAMILY MEDICINE

## 2021-04-05 PROCEDURE — 1159F PR MEDICATION LIST DOCUMENTED IN MEDICAL RECORD: ICD-10-PCS | Mod: 95,,, | Performed by: FAMILY MEDICINE

## 2021-04-05 PROCEDURE — 1157F PR ADVANCE CARE PLAN OR EQUIV PRESENT IN MEDICAL RECORD: ICD-10-PCS | Mod: 95,,, | Performed by: FAMILY MEDICINE

## 2021-04-05 PROCEDURE — 99214 PR OFFICE/OUTPT VISIT, EST, LEVL IV, 30-39 MIN: ICD-10-PCS | Mod: 95,,, | Performed by: FAMILY MEDICINE

## 2021-04-05 PROCEDURE — 1159F MED LIST DOCD IN RCRD: CPT | Mod: 95,,, | Performed by: FAMILY MEDICINE

## 2021-04-05 PROCEDURE — 99214 OFFICE O/P EST MOD 30 MIN: CPT | Mod: 95,,, | Performed by: FAMILY MEDICINE

## 2021-04-05 RX ORDER — FUROSEMIDE 20 MG/1
20 TABLET ORAL DAILY
Status: ON HOLD | COMMUNITY
Start: 2021-03-15 | End: 2021-04-20 | Stop reason: SDUPTHER

## 2021-04-05 RX ORDER — NITROGLYCERIN 0.4 MG/1
1 TABLET SUBLINGUAL
Status: ON HOLD | COMMUNITY
Start: 2021-03-15 | End: 2021-04-20 | Stop reason: SDUPTHER

## 2021-04-05 RX ORDER — ZINC SULFATE 50(220)MG
2 CAPSULE ORAL 2 TIMES DAILY
Status: ON HOLD | COMMUNITY
End: 2021-04-20 | Stop reason: SDUPTHER

## 2021-04-05 RX ORDER — POTASSIUM CHLORIDE 20 MEQ/1
1 TABLET, EXTENDED RELEASE ORAL DAILY
Status: ON HOLD | COMMUNITY
Start: 2021-03-15 | End: 2021-04-20 | Stop reason: SDUPTHER

## 2021-04-05 RX ORDER — CYCLOSPORINE 0.5 MG/ML
1 EMULSION OPHTHALMIC 2 TIMES DAILY
COMMUNITY
Start: 2021-02-23 | End: 2022-02-24 | Stop reason: SDUPTHER

## 2021-04-16 ENCOUNTER — TELEPHONE (OUTPATIENT)
Dept: FAMILY MEDICINE | Facility: CLINIC | Age: 86
End: 2021-04-16

## 2021-04-18 ENCOUNTER — HOSPITAL ENCOUNTER (INPATIENT)
Facility: HOSPITAL | Age: 86
LOS: 1 days | Discharge: HOME HEALTH | DRG: 064 | End: 2021-04-20
Attending: EMERGENCY MEDICINE | Admitting: INTERNAL MEDICINE
Payer: MEDICARE

## 2021-04-18 DIAGNOSIS — I63.9 CVA (CEREBRAL VASCULAR ACCIDENT): Primary | ICD-10-CM

## 2021-04-18 DIAGNOSIS — E88.09 HYPOALBUMINEMIA: ICD-10-CM

## 2021-04-18 DIAGNOSIS — I63.9 STROKE: ICD-10-CM

## 2021-04-18 DIAGNOSIS — G45.9 TIA (TRANSIENT ISCHEMIC ATTACK): ICD-10-CM

## 2021-04-18 LAB
ALBUMIN SERPL BCP-MCNC: 2.4 G/DL (ref 3.5–5.2)
ALP SERPL-CCNC: 116 U/L (ref 55–135)
ALT SERPL W/O P-5'-P-CCNC: 11 U/L (ref 10–44)
ANION GAP SERPL CALC-SCNC: 9 MMOL/L (ref 8–16)
AST SERPL-CCNC: 21 U/L (ref 10–40)
BASOPHILS # BLD AUTO: 0.01 K/UL (ref 0–0.2)
BASOPHILS NFR BLD: 0.1 % (ref 0–1.9)
BILIRUB SERPL-MCNC: 0.6 MG/DL (ref 0.1–1)
BUN SERPL-MCNC: 14 MG/DL (ref 10–30)
CALCIUM SERPL-MCNC: 8.8 MG/DL (ref 8.7–10.5)
CHLORIDE SERPL-SCNC: 100 MMOL/L (ref 95–110)
CHOLEST SERPL-MCNC: 102 MG/DL (ref 120–199)
CHOLEST/HDLC SERPL: 3.1 {RATIO} (ref 2–5)
CO2 SERPL-SCNC: 26 MMOL/L (ref 23–29)
CREAT SERPL-MCNC: 0.8 MG/DL (ref 0.5–1.4)
CTP QC/QA: YES
DIFFERENTIAL METHOD: ABNORMAL
EOSINOPHIL # BLD AUTO: 0 K/UL (ref 0–0.5)
EOSINOPHIL NFR BLD: 0.2 % (ref 0–8)
ERYTHROCYTE [DISTWIDTH] IN BLOOD BY AUTOMATED COUNT: 18.7 % (ref 11.5–14.5)
EST. GFR  (AFRICAN AMERICAN): >60 ML/MIN/1.73 M^2
EST. GFR  (NON AFRICAN AMERICAN): >60 ML/MIN/1.73 M^2
ESTIMATED AVG GLUCOSE: 97 MG/DL (ref 68–131)
GLUCOSE SERPL-MCNC: 125 MG/DL (ref 70–110)
GLUCOSE SERPL-MCNC: 86 MG/DL (ref 70–110)
HBA1C MFR BLD: 5 % (ref 4–5.6)
HCT VFR BLD AUTO: 29.2 % (ref 37–48.5)
HDLC SERPL-MCNC: 33 MG/DL (ref 40–75)
HDLC SERPL: 32.4 % (ref 20–50)
HGB BLD-MCNC: 9.4 G/DL (ref 12–16)
IMM GRANULOCYTES # BLD AUTO: 0.04 K/UL (ref 0–0.04)
IMM GRANULOCYTES NFR BLD AUTO: 0.5 % (ref 0–0.5)
INR PPP: 1 (ref 0.8–1.2)
LDLC SERPL CALC-MCNC: 46 MG/DL (ref 63–159)
LYMPHOCYTES # BLD AUTO: 1.4 K/UL (ref 1–4.8)
LYMPHOCYTES NFR BLD: 17.6 % (ref 18–48)
MCH RBC QN AUTO: 28.8 PG (ref 27–31)
MCHC RBC AUTO-ENTMCNC: 32.2 G/DL (ref 32–36)
MCV RBC AUTO: 90 FL (ref 82–98)
MONOCYTES # BLD AUTO: 0.4 K/UL (ref 0.3–1)
MONOCYTES NFR BLD: 5.2 % (ref 4–15)
NEUTROPHILS # BLD AUTO: 6.2 K/UL (ref 1.8–7.7)
NEUTROPHILS NFR BLD: 76.4 % (ref 38–73)
NONHDLC SERPL-MCNC: 69 MG/DL
NRBC BLD-RTO: 0 /100 WBC
PLATELET # BLD AUTO: 147 K/UL (ref 150–450)
PMV BLD AUTO: 9.8 FL (ref 9.2–12.9)
POTASSIUM SERPL-SCNC: 5.1 MMOL/L (ref 3.5–5.1)
PROT SERPL-MCNC: 6 G/DL (ref 6–8.4)
PROTHROMBIN TIME: 10.5 SEC (ref 9–12.5)
RBC # BLD AUTO: 3.26 M/UL (ref 4–5.4)
SARS-COV-2 RDRP RESP QL NAA+PROBE: NEGATIVE
SODIUM SERPL-SCNC: 135 MMOL/L (ref 136–145)
TRIGL SERPL-MCNC: 115 MG/DL (ref 30–150)
TROPONIN I SERPL DL<=0.01 NG/ML-MCNC: 0.09 NG/ML (ref 0–0.03)
TROPONIN I SERPL DL<=0.01 NG/ML-MCNC: 0.09 NG/ML (ref 0–0.03)
TSH SERPL DL<=0.005 MIU/L-ACNC: 0.77 UIU/ML (ref 0.4–4)
WBC # BLD AUTO: 8.13 K/UL (ref 3.9–12.7)

## 2021-04-18 PROCEDURE — 84484 ASSAY OF TROPONIN QUANT: CPT | Mod: 91 | Performed by: EMERGENCY MEDICINE

## 2021-04-18 PROCEDURE — 36415 COLL VENOUS BLD VENIPUNCTURE: CPT | Performed by: INTERNAL MEDICINE

## 2021-04-18 PROCEDURE — G0378 HOSPITAL OBSERVATION PER HR: HCPCS

## 2021-04-18 PROCEDURE — 25000003 PHARM REV CODE 250: Performed by: INTERNAL MEDICINE

## 2021-04-18 PROCEDURE — 83036 HEMOGLOBIN GLYCOSYLATED A1C: CPT | Performed by: INTERNAL MEDICINE

## 2021-04-18 PROCEDURE — U0002 COVID-19 LAB TEST NON-CDC: HCPCS | Performed by: EMERGENCY MEDICINE

## 2021-04-18 PROCEDURE — 85025 COMPLETE CBC W/AUTO DIFF WBC: CPT | Performed by: EMERGENCY MEDICINE

## 2021-04-18 PROCEDURE — 99285 EMERGENCY DEPT VISIT HI MDM: CPT | Mod: 25

## 2021-04-18 PROCEDURE — 80061 LIPID PANEL: CPT | Performed by: EMERGENCY MEDICINE

## 2021-04-18 PROCEDURE — 93005 ELECTROCARDIOGRAM TRACING: CPT

## 2021-04-18 PROCEDURE — 85610 PROTHROMBIN TIME: CPT | Performed by: EMERGENCY MEDICINE

## 2021-04-18 PROCEDURE — 84484 ASSAY OF TROPONIN QUANT: CPT | Performed by: INTERNAL MEDICINE

## 2021-04-18 PROCEDURE — 96372 THER/PROPH/DIAG INJ SC/IM: CPT | Performed by: EMERGENCY MEDICINE

## 2021-04-18 PROCEDURE — 82962 GLUCOSE BLOOD TEST: CPT

## 2021-04-18 PROCEDURE — 93010 EKG 12-LEAD: ICD-10-PCS | Mod: ,,, | Performed by: INTERNAL MEDICINE

## 2021-04-18 PROCEDURE — 84443 ASSAY THYROID STIM HORMONE: CPT | Performed by: EMERGENCY MEDICINE

## 2021-04-18 PROCEDURE — 63600175 PHARM REV CODE 636 W HCPCS: Performed by: INTERNAL MEDICINE

## 2021-04-18 PROCEDURE — 80053 COMPREHEN METABOLIC PANEL: CPT | Performed by: EMERGENCY MEDICINE

## 2021-04-18 PROCEDURE — 93010 ELECTROCARDIOGRAM REPORT: CPT | Mod: ,,, | Performed by: INTERNAL MEDICINE

## 2021-04-18 RX ORDER — CLOPIDOGREL BISULFATE 75 MG/1
75 TABLET ORAL DAILY
Status: DISCONTINUED | OUTPATIENT
Start: 2021-04-18 | End: 2021-04-20 | Stop reason: HOSPADM

## 2021-04-18 RX ORDER — OLOPATADINE HYDROCHLORIDE 1 MG/ML
1 SOLUTION/ DROPS OPHTHALMIC DAILY
Status: DISCONTINUED | OUTPATIENT
Start: 2021-04-18 | End: 2021-04-20 | Stop reason: HOSPADM

## 2021-04-18 RX ORDER — TALC
6 POWDER (GRAM) TOPICAL NIGHTLY PRN
Status: DISCONTINUED | OUTPATIENT
Start: 2021-04-18 | End: 2021-04-20 | Stop reason: HOSPADM

## 2021-04-18 RX ORDER — ATORVASTATIN CALCIUM 40 MG/1
40 TABLET, FILM COATED ORAL DAILY
Status: DISCONTINUED | OUTPATIENT
Start: 2021-04-18 | End: 2021-04-20 | Stop reason: HOSPADM

## 2021-04-18 RX ORDER — LABETALOL HYDROCHLORIDE 5 MG/ML
10 INJECTION, SOLUTION INTRAVENOUS
Status: DISCONTINUED | OUTPATIENT
Start: 2021-04-18 | End: 2021-04-18

## 2021-04-18 RX ORDER — ENOXAPARIN SODIUM 100 MG/ML
40 INJECTION SUBCUTANEOUS EVERY 24 HOURS
Status: DISCONTINUED | OUTPATIENT
Start: 2021-04-18 | End: 2021-04-20 | Stop reason: HOSPADM

## 2021-04-18 RX ORDER — PREDNISOLONE ACETATE 10 MG/ML
1 SUSPENSION/ DROPS OPHTHALMIC 2 TIMES DAILY
Status: DISCONTINUED | OUTPATIENT
Start: 2021-04-18 | End: 2021-04-20 | Stop reason: HOSPADM

## 2021-04-18 RX ORDER — ZINC SULFATE 50(220)MG
440 CAPSULE ORAL 2 TIMES DAILY
Status: DISCONTINUED | OUTPATIENT
Start: 2021-04-18 | End: 2021-04-20 | Stop reason: HOSPADM

## 2021-04-18 RX ORDER — ONDANSETRON 2 MG/ML
4 INJECTION INTRAMUSCULAR; INTRAVENOUS EVERY 8 HOURS PRN
Status: DISCONTINUED | OUTPATIENT
Start: 2021-04-18 | End: 2021-04-20 | Stop reason: HOSPADM

## 2021-04-18 RX ORDER — TAMSULOSIN HYDROCHLORIDE 0.4 MG/1
0.4 CAPSULE ORAL DAILY
Status: DISCONTINUED | OUTPATIENT
Start: 2021-04-18 | End: 2021-04-20 | Stop reason: HOSPADM

## 2021-04-18 RX ORDER — FUROSEMIDE 20 MG/1
20 TABLET ORAL DAILY
Status: DISCONTINUED | OUTPATIENT
Start: 2021-04-19 | End: 2021-04-19

## 2021-04-18 RX ORDER — POTASSIUM CHLORIDE 20 MEQ/1
20 TABLET, EXTENDED RELEASE ORAL DAILY
Status: DISCONTINUED | OUTPATIENT
Start: 2021-04-19 | End: 2021-04-20 | Stop reason: HOSPADM

## 2021-04-18 RX ORDER — SODIUM CHLORIDE 0.9 % (FLUSH) 0.9 %
10 SYRINGE (ML) INJECTION
Status: DISCONTINUED | OUTPATIENT
Start: 2021-04-18 | End: 2021-04-20 | Stop reason: HOSPADM

## 2021-04-18 RX ORDER — POLYETHYLENE GLYCOL 3350 17 G/17G
17 POWDER, FOR SOLUTION ORAL 2 TIMES DAILY
Status: DISCONTINUED | OUTPATIENT
Start: 2021-04-18 | End: 2021-04-20 | Stop reason: HOSPADM

## 2021-04-18 RX ORDER — CARVEDILOL 3.12 MG/1
3.12 TABLET ORAL 2 TIMES DAILY WITH MEALS
Status: DISCONTINUED | OUTPATIENT
Start: 2021-04-18 | End: 2021-04-19

## 2021-04-18 RX ORDER — PANTOPRAZOLE SODIUM 40 MG/1
40 TABLET, DELAYED RELEASE ORAL DAILY
Status: DISCONTINUED | OUTPATIENT
Start: 2021-04-19 | End: 2021-04-20 | Stop reason: HOSPADM

## 2021-04-18 RX ORDER — NITROGLYCERIN 0.4 MG/1
0.4 TABLET SUBLINGUAL EVERY 5 MIN PRN
Status: DISCONTINUED | OUTPATIENT
Start: 2021-04-18 | End: 2021-04-20 | Stop reason: HOSPADM

## 2021-04-18 RX ORDER — ASCORBIC ACID 500 MG
500 TABLET ORAL DAILY
Status: DISCONTINUED | OUTPATIENT
Start: 2021-04-18 | End: 2021-04-20 | Stop reason: HOSPADM

## 2021-04-18 RX ADMIN — ZINC SULFATE 220 MG (50 MG) CAPSULE 440 MG: CAPSULE at 08:04

## 2021-04-18 RX ADMIN — TAMSULOSIN HYDROCHLORIDE 0.4 MG: 0.4 CAPSULE ORAL at 01:04

## 2021-04-18 RX ADMIN — ZINC SULFATE 220 MG (50 MG) CAPSULE 440 MG: CAPSULE at 05:04

## 2021-04-18 RX ADMIN — OXYCODONE HYDROCHLORIDE AND ACETAMINOPHEN 500 MG: 500 TABLET ORAL at 01:04

## 2021-04-18 RX ADMIN — CLOPIDOGREL 75 MG: 75 TABLET, FILM COATED ORAL at 12:04

## 2021-04-18 RX ADMIN — POLYETHYLENE GLYCOL 3350 17 G: 17 POWDER, FOR SOLUTION ORAL at 08:04

## 2021-04-18 RX ADMIN — ATORVASTATIN CALCIUM 40 MG: 40 TABLET, FILM COATED ORAL at 01:04

## 2021-04-18 RX ADMIN — ENOXAPARIN SODIUM 40 MG: 40 INJECTION SUBCUTANEOUS at 05:04

## 2021-04-19 PROBLEM — I63.9 CVA (CEREBRAL VASCULAR ACCIDENT): Status: ACTIVE | Noted: 2021-04-19

## 2021-04-19 PROBLEM — I63.9 ACUTE CVA (CEREBROVASCULAR ACCIDENT): Status: ACTIVE | Noted: 2021-04-18

## 2021-04-19 LAB
ANION GAP SERPL CALC-SCNC: 6 MMOL/L (ref 8–16)
APTT BLDCRRT: 25.9 SEC (ref 21–32)
BASOPHILS # BLD AUTO: 0.02 K/UL (ref 0–0.2)
BASOPHILS NFR BLD: 0.3 % (ref 0–1.9)
BUN SERPL-MCNC: 14 MG/DL (ref 10–30)
CALCIUM SERPL-MCNC: 8.4 MG/DL (ref 8.7–10.5)
CHLORIDE SERPL-SCNC: 102 MMOL/L (ref 95–110)
CO2 SERPL-SCNC: 27 MMOL/L (ref 23–29)
CREAT SERPL-MCNC: 0.7 MG/DL (ref 0.5–1.4)
DIFFERENTIAL METHOD: ABNORMAL
EOSINOPHIL # BLD AUTO: 0 K/UL (ref 0–0.5)
EOSINOPHIL NFR BLD: 0.4 % (ref 0–8)
ERYTHROCYTE [DISTWIDTH] IN BLOOD BY AUTOMATED COUNT: 18.9 % (ref 11.5–14.5)
EST. GFR  (AFRICAN AMERICAN): >60 ML/MIN/1.73 M^2
EST. GFR  (NON AFRICAN AMERICAN): >60 ML/MIN/1.73 M^2
GLUCOSE SERPL-MCNC: 85 MG/DL (ref 70–110)
HCT VFR BLD AUTO: 23.8 % (ref 37–48.5)
HGB BLD-MCNC: 7.7 G/DL (ref 12–16)
IMM GRANULOCYTES # BLD AUTO: 0.03 K/UL (ref 0–0.04)
IMM GRANULOCYTES NFR BLD AUTO: 0.4 % (ref 0–0.5)
INR PPP: 1 (ref 0.8–1.2)
LYMPHOCYTES # BLD AUTO: 1.6 K/UL (ref 1–4.8)
LYMPHOCYTES NFR BLD: 22 % (ref 18–48)
MAGNESIUM SERPL-MCNC: 2.2 MG/DL (ref 1.6–2.6)
MCH RBC QN AUTO: 28.9 PG (ref 27–31)
MCHC RBC AUTO-ENTMCNC: 32.4 G/DL (ref 32–36)
MCV RBC AUTO: 90 FL (ref 82–98)
MONOCYTES # BLD AUTO: 0.4 K/UL (ref 0.3–1)
MONOCYTES NFR BLD: 5.7 % (ref 4–15)
NEUTROPHILS # BLD AUTO: 5.1 K/UL (ref 1.8–7.7)
NEUTROPHILS NFR BLD: 71.2 % (ref 38–73)
NRBC BLD-RTO: 0 /100 WBC
PHOSPHATE SERPL-MCNC: 2.6 MG/DL (ref 2.7–4.5)
PLATELET # BLD AUTO: 142 K/UL (ref 150–450)
PMV BLD AUTO: 9 FL (ref 9.2–12.9)
POTASSIUM SERPL-SCNC: 4.4 MMOL/L (ref 3.5–5.1)
PROTHROMBIN TIME: 10.8 SEC (ref 9–12.5)
RBC # BLD AUTO: 2.66 M/UL (ref 4–5.4)
SODIUM SERPL-SCNC: 135 MMOL/L (ref 136–145)
WBC # BLD AUTO: 7.15 K/UL (ref 3.9–12.7)

## 2021-04-19 PROCEDURE — 97161 PT EVAL LOW COMPLEX 20 MIN: CPT

## 2021-04-19 PROCEDURE — 80048 BASIC METABOLIC PNL TOTAL CA: CPT | Performed by: INTERNAL MEDICINE

## 2021-04-19 PROCEDURE — 25000003 PHARM REV CODE 250: Performed by: INTERNAL MEDICINE

## 2021-04-19 PROCEDURE — 85610 PROTHROMBIN TIME: CPT | Performed by: INTERNAL MEDICINE

## 2021-04-19 PROCEDURE — 94761 N-INVAS EAR/PLS OXIMETRY MLT: CPT

## 2021-04-19 PROCEDURE — 92610 EVALUATE SWALLOWING FUNCTION: CPT

## 2021-04-19 PROCEDURE — 85730 THROMBOPLASTIN TIME PARTIAL: CPT | Performed by: INTERNAL MEDICINE

## 2021-04-19 PROCEDURE — 11000001 HC ACUTE MED/SURG PRIVATE ROOM

## 2021-04-19 PROCEDURE — 85025 COMPLETE CBC W/AUTO DIFF WBC: CPT | Performed by: INTERNAL MEDICINE

## 2021-04-19 PROCEDURE — 63600175 PHARM REV CODE 636 W HCPCS: Performed by: INTERNAL MEDICINE

## 2021-04-19 PROCEDURE — 84100 ASSAY OF PHOSPHORUS: CPT | Performed by: INTERNAL MEDICINE

## 2021-04-19 PROCEDURE — 97165 OT EVAL LOW COMPLEX 30 MIN: CPT

## 2021-04-19 PROCEDURE — 36415 COLL VENOUS BLD VENIPUNCTURE: CPT | Performed by: INTERNAL MEDICINE

## 2021-04-19 PROCEDURE — 83735 ASSAY OF MAGNESIUM: CPT | Performed by: INTERNAL MEDICINE

## 2021-04-19 RX ADMIN — ATORVASTATIN CALCIUM 40 MG: 40 TABLET, FILM COATED ORAL at 10:04

## 2021-04-19 RX ADMIN — ZINC SULFATE 220 MG (50 MG) CAPSULE 440 MG: CAPSULE at 10:04

## 2021-04-19 RX ADMIN — OLOPATADINE HYDROCHLORIDE 1 DROP: 1 SOLUTION/ DROPS OPHTHALMIC at 10:04

## 2021-04-19 RX ADMIN — POTASSIUM CHLORIDE 20 MEQ: 1500 TABLET, EXTENDED RELEASE ORAL at 10:04

## 2021-04-19 RX ADMIN — CLOPIDOGREL 75 MG: 75 TABLET, FILM COATED ORAL at 10:04

## 2021-04-19 RX ADMIN — POLYETHYLENE GLYCOL 3350 17 G: 17 POWDER, FOR SOLUTION ORAL at 10:04

## 2021-04-19 RX ADMIN — POLYETHYLENE GLYCOL 3350 17 G: 17 POWDER, FOR SOLUTION ORAL at 09:04

## 2021-04-19 RX ADMIN — PREDNISOLONE ACETATE 1 DROP: 10 SUSPENSION/ DROPS OPHTHALMIC at 09:04

## 2021-04-19 RX ADMIN — TAMSULOSIN HYDROCHLORIDE 0.4 MG: 0.4 CAPSULE ORAL at 10:04

## 2021-04-19 RX ADMIN — PANTOPRAZOLE SODIUM 40 MG: 40 TABLET, DELAYED RELEASE ORAL at 10:04

## 2021-04-19 RX ADMIN — PREDNISOLONE ACETATE 1 DROP: 10 SUSPENSION/ DROPS OPHTHALMIC at 10:04

## 2021-04-19 RX ADMIN — ENOXAPARIN SODIUM 40 MG: 40 INJECTION SUBCUTANEOUS at 06:04

## 2021-04-19 RX ADMIN — OXYCODONE HYDROCHLORIDE AND ACETAMINOPHEN 500 MG: 500 TABLET ORAL at 10:04

## 2021-04-19 RX ADMIN — ZINC SULFATE 220 MG (50 MG) CAPSULE 440 MG: CAPSULE at 09:04

## 2021-04-20 VITALS
TEMPERATURE: 97 F | SYSTOLIC BLOOD PRESSURE: 105 MMHG | RESPIRATION RATE: 15 BRPM | HEIGHT: 59 IN | BODY MASS INDEX: 19.96 KG/M2 | DIASTOLIC BLOOD PRESSURE: 57 MMHG | OXYGEN SATURATION: 96 % | WEIGHT: 99 LBS | HEART RATE: 63 BPM

## 2021-04-20 LAB
ANION GAP SERPL CALC-SCNC: 4 MMOL/L (ref 8–16)
BASOPHILS # BLD AUTO: 0.01 K/UL (ref 0–0.2)
BASOPHILS NFR BLD: 0.2 % (ref 0–1.9)
BUN SERPL-MCNC: 14 MG/DL (ref 10–30)
CALCIUM SERPL-MCNC: 8.7 MG/DL (ref 8.7–10.5)
CHLORIDE SERPL-SCNC: 103 MMOL/L (ref 95–110)
CO2 SERPL-SCNC: 28 MMOL/L (ref 23–29)
CREAT SERPL-MCNC: 0.7 MG/DL (ref 0.5–1.4)
DIFFERENTIAL METHOD: ABNORMAL
EOSINOPHIL # BLD AUTO: 0 K/UL (ref 0–0.5)
EOSINOPHIL NFR BLD: 0.5 % (ref 0–8)
ERYTHROCYTE [DISTWIDTH] IN BLOOD BY AUTOMATED COUNT: 18.7 % (ref 11.5–14.5)
EST. GFR  (AFRICAN AMERICAN): >60 ML/MIN/1.73 M^2
EST. GFR  (NON AFRICAN AMERICAN): >60 ML/MIN/1.73 M^2
GLUCOSE SERPL-MCNC: 80 MG/DL (ref 70–110)
HCT VFR BLD AUTO: 25.7 % (ref 37–48.5)
HGB BLD-MCNC: 8.2 G/DL (ref 12–16)
IMM GRANULOCYTES # BLD AUTO: 0.04 K/UL (ref 0–0.04)
IMM GRANULOCYTES NFR BLD AUTO: 0.7 % (ref 0–0.5)
LYMPHOCYTES # BLD AUTO: 1.3 K/UL (ref 1–4.8)
LYMPHOCYTES NFR BLD: 22.1 % (ref 18–48)
MAGNESIUM SERPL-MCNC: 2.1 MG/DL (ref 1.6–2.6)
MCH RBC QN AUTO: 28.9 PG (ref 27–31)
MCHC RBC AUTO-ENTMCNC: 31.9 G/DL (ref 32–36)
MCV RBC AUTO: 91 FL (ref 82–98)
MONOCYTES # BLD AUTO: 0.4 K/UL (ref 0.3–1)
MONOCYTES NFR BLD: 6.6 % (ref 4–15)
NEUTROPHILS # BLD AUTO: 4.2 K/UL (ref 1.8–7.7)
NEUTROPHILS NFR BLD: 69.9 % (ref 38–73)
NRBC BLD-RTO: 0 /100 WBC
PLATELET # BLD AUTO: 136 K/UL (ref 150–450)
PMV BLD AUTO: 8.8 FL (ref 9.2–12.9)
POTASSIUM SERPL-SCNC: 4.7 MMOL/L (ref 3.5–5.1)
RBC # BLD AUTO: 2.84 M/UL (ref 4–5.4)
SODIUM SERPL-SCNC: 135 MMOL/L (ref 136–145)
WBC # BLD AUTO: 6.02 K/UL (ref 3.9–12.7)

## 2021-04-20 PROCEDURE — 85025 COMPLETE CBC W/AUTO DIFF WBC: CPT | Performed by: INTERNAL MEDICINE

## 2021-04-20 PROCEDURE — 94761 N-INVAS EAR/PLS OXIMETRY MLT: CPT

## 2021-04-20 PROCEDURE — 36415 COLL VENOUS BLD VENIPUNCTURE: CPT | Performed by: INTERNAL MEDICINE

## 2021-04-20 PROCEDURE — 80048 BASIC METABOLIC PNL TOTAL CA: CPT | Performed by: INTERNAL MEDICINE

## 2021-04-20 PROCEDURE — 25000003 PHARM REV CODE 250: Performed by: INTERNAL MEDICINE

## 2021-04-20 PROCEDURE — 83735 ASSAY OF MAGNESIUM: CPT | Performed by: INTERNAL MEDICINE

## 2021-04-20 RX ORDER — PREDNISOLONE ACETATE 10 MG/ML
1 SUSPENSION/ DROPS OPHTHALMIC 2 TIMES DAILY
Qty: 5 ML | Refills: 0 | Status: SHIPPED | OUTPATIENT
Start: 2021-04-20 | End: 2022-10-19

## 2021-04-20 RX ORDER — ZINC SULFATE 50(220)MG
440 CAPSULE ORAL 2 TIMES DAILY
Qty: 60 CAPSULE | Refills: 0 | Status: SHIPPED | OUTPATIENT
Start: 2021-04-20 | End: 2021-05-13 | Stop reason: SDUPTHER

## 2021-04-20 RX ORDER — PREDNISOLONE ACETATE 10 MG/ML
1 SUSPENSION/ DROPS OPHTHALMIC 2 TIMES DAILY
Qty: 5 ML | Refills: 0 | Status: SHIPPED | OUTPATIENT
Start: 2021-04-20 | End: 2021-04-20 | Stop reason: SDUPTHER

## 2021-04-20 RX ORDER — NITROGLYCERIN 0.4 MG/1
0.4 TABLET SUBLINGUAL
Qty: 15 TABLET | Refills: 0 | Status: SHIPPED | OUTPATIENT
Start: 2021-04-20 | End: 2021-05-13 | Stop reason: SDUPTHER

## 2021-04-20 RX ORDER — CARVEDILOL 3.12 MG/1
3.12 TABLET ORAL 2 TIMES DAILY WITH MEALS
Qty: 60 TABLET | Refills: 0 | Status: SHIPPED | OUTPATIENT
Start: 2021-04-20 | End: 2021-04-20

## 2021-04-20 RX ORDER — POLYETHYLENE GLYCOL 3350 17 G/17G
17 POWDER, FOR SOLUTION ORAL 2 TIMES DAILY
Qty: 60 EACH | Refills: 0 | Status: SHIPPED | OUTPATIENT
Start: 2021-04-20 | End: 2021-05-13 | Stop reason: SDUPTHER

## 2021-04-20 RX ORDER — CLOPIDOGREL BISULFATE 75 MG/1
75 TABLET ORAL DAILY
Qty: 18 TABLET | Refills: 0 | Status: SHIPPED | OUTPATIENT
Start: 2021-04-21 | End: 2021-04-20

## 2021-04-20 RX ORDER — ATORVASTATIN CALCIUM 40 MG/1
40 TABLET, FILM COATED ORAL DAILY
Qty: 30 TABLET | Refills: 0 | Status: SHIPPED | OUTPATIENT
Start: 2021-04-20 | End: 2021-05-13 | Stop reason: SDUPTHER

## 2021-04-20 RX ORDER — CLOPIDOGREL BISULFATE 75 MG/1
75 TABLET ORAL DAILY
Qty: 18 TABLET | Refills: 0 | Status: SHIPPED | OUTPATIENT
Start: 2021-04-21 | End: 2021-05-13

## 2021-04-20 RX ORDER — PANTOPRAZOLE SODIUM 40 MG/1
40 TABLET, DELAYED RELEASE ORAL DAILY
Qty: 30 TABLET | Refills: 0 | Status: SHIPPED | OUTPATIENT
Start: 2021-04-20 | End: 2021-05-13 | Stop reason: SDUPTHER

## 2021-04-20 RX ORDER — PANTOPRAZOLE SODIUM 40 MG/1
40 TABLET, DELAYED RELEASE ORAL DAILY
Qty: 30 TABLET | Refills: 0 | Status: SHIPPED | OUTPATIENT
Start: 2021-04-20 | End: 2021-04-20

## 2021-04-20 RX ORDER — FUROSEMIDE 20 MG/1
20 TABLET ORAL DAILY
Qty: 30 TABLET | Refills: 0 | Status: SHIPPED | OUTPATIENT
Start: 2021-04-20 | End: 2021-05-13 | Stop reason: SDUPTHER

## 2021-04-20 RX ORDER — CARVEDILOL 3.12 MG/1
3.12 TABLET ORAL 2 TIMES DAILY WITH MEALS
Status: DISCONTINUED | OUTPATIENT
Start: 2021-04-20 | End: 2021-04-20 | Stop reason: HOSPADM

## 2021-04-20 RX ORDER — TALC
6 POWDER (GRAM) TOPICAL NIGHTLY PRN
Qty: 60 TABLET | Refills: 0 | Status: SHIPPED | OUTPATIENT
Start: 2021-04-20 | End: 2021-05-13 | Stop reason: SDUPTHER

## 2021-04-20 RX ORDER — POTASSIUM CHLORIDE 20 MEQ/1
20 TABLET, EXTENDED RELEASE ORAL DAILY
Qty: 30 TABLET | Refills: 0 | Status: SHIPPED | OUTPATIENT
Start: 2021-04-20 | End: 2021-04-20 | Stop reason: SDUPTHER

## 2021-04-20 RX ORDER — TAMSULOSIN HYDROCHLORIDE 0.4 MG/1
0.4 CAPSULE ORAL DAILY
Qty: 30 CAPSULE | Refills: 0 | Status: SHIPPED | OUTPATIENT
Start: 2021-04-20 | End: 2021-05-31

## 2021-04-20 RX ORDER — POTASSIUM CHLORIDE 20 MEQ/1
20 TABLET, EXTENDED RELEASE ORAL DAILY
Qty: 30 TABLET | Refills: 0 | Status: SHIPPED | OUTPATIENT
Start: 2021-04-20 | End: 2021-05-13 | Stop reason: SDUPTHER

## 2021-04-20 RX ORDER — TAMSULOSIN HYDROCHLORIDE 0.4 MG/1
0.4 CAPSULE ORAL DAILY
Qty: 30 CAPSULE | Refills: 0 | Status: SHIPPED | OUTPATIENT
Start: 2021-04-20 | End: 2021-04-20

## 2021-04-20 RX ORDER — FUROSEMIDE 20 MG/1
20 TABLET ORAL DAILY
Status: DISCONTINUED | OUTPATIENT
Start: 2021-04-20 | End: 2021-04-20 | Stop reason: HOSPADM

## 2021-04-20 RX ORDER — FUROSEMIDE 20 MG/1
20 TABLET ORAL DAILY
Qty: 30 TABLET | Refills: 0 | Status: SHIPPED | OUTPATIENT
Start: 2021-04-20 | End: 2021-04-20 | Stop reason: SDUPTHER

## 2021-04-20 RX ORDER — ATORVASTATIN CALCIUM 40 MG/1
40 TABLET, FILM COATED ORAL DAILY
Qty: 30 TABLET | Refills: 0 | Status: SHIPPED | OUTPATIENT
Start: 2021-04-20 | End: 2021-04-20 | Stop reason: SDUPTHER

## 2021-04-20 RX ORDER — CARVEDILOL 3.12 MG/1
3.12 TABLET ORAL 2 TIMES DAILY WITH MEALS
Qty: 60 TABLET | Refills: 0 | Status: SHIPPED | OUTPATIENT
Start: 2021-04-20 | End: 2021-05-13 | Stop reason: SDUPTHER

## 2021-04-20 RX ADMIN — OLOPATADINE HYDROCHLORIDE 1 DROP: 1 SOLUTION/ DROPS OPHTHALMIC at 08:04

## 2021-04-20 RX ADMIN — FUROSEMIDE 20 MG: 20 TABLET ORAL at 10:04

## 2021-04-20 RX ADMIN — PREDNISOLONE ACETATE 1 DROP: 10 SUSPENSION/ DROPS OPHTHALMIC at 08:04

## 2021-04-20 RX ADMIN — ZINC SULFATE 220 MG (50 MG) CAPSULE 440 MG: CAPSULE at 08:04

## 2021-04-20 RX ADMIN — ATORVASTATIN CALCIUM 40 MG: 40 TABLET, FILM COATED ORAL at 08:04

## 2021-04-20 RX ADMIN — POLYETHYLENE GLYCOL 3350 17 G: 17 POWDER, FOR SOLUTION ORAL at 08:04

## 2021-04-20 RX ADMIN — OXYCODONE HYDROCHLORIDE AND ACETAMINOPHEN 500 MG: 500 TABLET ORAL at 08:04

## 2021-04-20 RX ADMIN — PANTOPRAZOLE SODIUM 40 MG: 40 TABLET, DELAYED RELEASE ORAL at 08:04

## 2021-04-20 RX ADMIN — CARVEDILOL 3.12 MG: 3.12 TABLET, FILM COATED ORAL at 10:04

## 2021-04-20 RX ADMIN — CLOPIDOGREL 75 MG: 75 TABLET, FILM COATED ORAL at 08:04

## 2021-04-20 RX ADMIN — TAMSULOSIN HYDROCHLORIDE 0.4 MG: 0.4 CAPSULE ORAL at 08:04

## 2021-04-20 RX ADMIN — POTASSIUM CHLORIDE 20 MEQ: 1500 TABLET, EXTENDED RELEASE ORAL at 08:04

## 2021-04-29 RX ORDER — TAMSULOSIN HYDROCHLORIDE 0.4 MG/1
CAPSULE ORAL
Qty: 30 CAPSULE | Refills: 0 | OUTPATIENT
Start: 2021-04-29

## 2021-04-29 RX ORDER — POTASSIUM CHLORIDE 20 MEQ/1
TABLET, EXTENDED RELEASE ORAL
Qty: 30 TABLET | Refills: 0 | OUTPATIENT
Start: 2021-04-29

## 2021-04-29 RX ORDER — CARVEDILOL 3.12 MG/1
TABLET ORAL
Qty: 60 TABLET | Refills: 0 | OUTPATIENT
Start: 2021-04-29

## 2021-04-29 RX ORDER — ATORVASTATIN CALCIUM 40 MG/1
TABLET, FILM COATED ORAL
Qty: 30 TABLET | Refills: 0 | OUTPATIENT
Start: 2021-04-29

## 2021-04-29 RX ORDER — PANTOPRAZOLE SODIUM 40 MG/1
TABLET, DELAYED RELEASE ORAL
Qty: 30 TABLET | Refills: 0 | OUTPATIENT
Start: 2021-04-29

## 2021-04-29 RX ORDER — OLOPATADINE HYDROCHLORIDE 7 MG/ML
SOLUTION OPHTHALMIC
Qty: 2.5 ML | Refills: 0 | OUTPATIENT
Start: 2021-04-29

## 2021-04-29 RX ORDER — UREA 10 %
LOTION (ML) TOPICAL
Qty: 120 TABLET | Refills: 0 | OUTPATIENT
Start: 2021-04-29

## 2021-05-03 ENCOUNTER — HOSPITAL ENCOUNTER (OUTPATIENT)
Dept: WOUND CARE | Facility: HOSPITAL | Age: 86
Discharge: HOME | End: 2021-05-03
Attending: PREVENTIVE MEDICINE
Payer: MEDICARE

## 2021-05-03 ENCOUNTER — HOSPITAL ENCOUNTER (INPATIENT)
Facility: HOSPITAL | Age: 86
LOS: 4 days | Discharge: HOME HEALTH | DRG: 539 | End: 2021-05-07
Attending: EMERGENCY MEDICINE | Admitting: HOSPITALIST
Payer: MEDICARE

## 2021-05-03 DIAGNOSIS — I10 ESSENTIAL HYPERTENSION: ICD-10-CM

## 2021-05-03 DIAGNOSIS — L89.159 PRESSURE INJURY OF SKIN OF SACRAL REGION, UNSPECIFIED INJURY STAGE: Primary | ICD-10-CM

## 2021-05-03 DIAGNOSIS — R53.81 DEBILITY: ICD-10-CM

## 2021-05-03 DIAGNOSIS — L89.153 PRESSURE ULCER OF SACRAL REGION, STAGE 3: Primary | ICD-10-CM

## 2021-05-03 DIAGNOSIS — M86.68 CHRONIC OSTEOMYELITIS OF SACRUM: ICD-10-CM

## 2021-05-03 PROBLEM — N39.0 UTI (URINARY TRACT INFECTION): Status: ACTIVE | Noted: 2021-05-03

## 2021-05-03 LAB
ALBUMIN SERPL BCP-MCNC: 2.6 G/DL (ref 3.5–5.2)
ALP SERPL-CCNC: 116 U/L (ref 55–135)
ALT SERPL W/O P-5'-P-CCNC: 11 U/L (ref 10–44)
ANION GAP SERPL CALC-SCNC: 7 MMOL/L (ref 8–16)
AST SERPL-CCNC: 13 U/L (ref 10–40)
BACTERIA #/AREA URNS HPF: ABNORMAL /HPF
BASOPHILS # BLD AUTO: 0.01 K/UL (ref 0–0.2)
BASOPHILS NFR BLD: 0.2 % (ref 0–1.9)
BILIRUB SERPL-MCNC: 0.4 MG/DL (ref 0.1–1)
BILIRUB UR QL STRIP: NEGATIVE
BUN SERPL-MCNC: 14 MG/DL (ref 10–30)
CALCIUM SERPL-MCNC: 9 MG/DL (ref 8.7–10.5)
CHLORIDE SERPL-SCNC: 104 MMOL/L (ref 95–110)
CLARITY UR: ABNORMAL
CO2 SERPL-SCNC: 27 MMOL/L (ref 23–29)
COLOR UR: YELLOW
CREAT SERPL-MCNC: 0.8 MG/DL (ref 0.5–1.4)
CTP QC/QA: YES
DIFFERENTIAL METHOD: ABNORMAL
EOSINOPHIL # BLD AUTO: 0 K/UL (ref 0–0.5)
EOSINOPHIL NFR BLD: 0.3 % (ref 0–8)
ERYTHROCYTE [DISTWIDTH] IN BLOOD BY AUTOMATED COUNT: 18.2 % (ref 11.5–14.5)
EST. GFR  (AFRICAN AMERICAN): >60 ML/MIN/1.73 M^2
EST. GFR  (NON AFRICAN AMERICAN): >60 ML/MIN/1.73 M^2
GLUCOSE SERPL-MCNC: 83 MG/DL (ref 70–110)
GLUCOSE UR QL STRIP: NEGATIVE
HCT VFR BLD AUTO: 26.6 % (ref 37–48.5)
HGB BLD-MCNC: 8.4 G/DL (ref 12–16)
HGB UR QL STRIP: NEGATIVE
IMM GRANULOCYTES # BLD AUTO: 0.03 K/UL (ref 0–0.04)
IMM GRANULOCYTES NFR BLD AUTO: 0.5 % (ref 0–0.5)
KETONES UR QL STRIP: NEGATIVE
LACTATE SERPL-SCNC: 0.8 MMOL/L (ref 0.5–2.2)
LEUKOCYTE ESTERASE UR QL STRIP: ABNORMAL
LYMPHOCYTES # BLD AUTO: 1.5 K/UL (ref 1–4.8)
LYMPHOCYTES NFR BLD: 22.7 % (ref 18–48)
MCH RBC QN AUTO: 29.2 PG (ref 27–31)
MCHC RBC AUTO-ENTMCNC: 31.6 G/DL (ref 32–36)
MCV RBC AUTO: 92 FL (ref 82–98)
MICROSCOPIC COMMENT: ABNORMAL
MONOCYTES # BLD AUTO: 0.3 K/UL (ref 0.3–1)
MONOCYTES NFR BLD: 4.1 % (ref 4–15)
NEUTROPHILS # BLD AUTO: 4.7 K/UL (ref 1.8–7.7)
NEUTROPHILS NFR BLD: 72.2 % (ref 38–73)
NITRITE UR QL STRIP: POSITIVE
NRBC BLD-RTO: 0 /100 WBC
PH UR STRIP: 6 [PH] (ref 5–8)
PLATELET # BLD AUTO: 164 K/UL (ref 150–450)
PMV BLD AUTO: 8.6 FL (ref 9.2–12.9)
POTASSIUM SERPL-SCNC: 4.2 MMOL/L (ref 3.5–5.1)
PROT SERPL-MCNC: 5.8 G/DL (ref 6–8.4)
PROT UR QL STRIP: NEGATIVE
RBC # BLD AUTO: 2.88 M/UL (ref 4–5.4)
RBC #/AREA URNS HPF: 5 /HPF (ref 0–4)
SARS-COV-2 RDRP RESP QL NAA+PROBE: NEGATIVE
SODIUM SERPL-SCNC: 138 MMOL/L (ref 136–145)
SP GR UR STRIP: 1.01 (ref 1–1.03)
URN SPEC COLLECT METH UR: ABNORMAL
UROBILINOGEN UR STRIP-ACNC: NEGATIVE EU/DL
WBC # BLD AUTO: 6.51 K/UL (ref 3.9–12.7)
WBC #/AREA URNS HPF: >100 /HPF (ref 0–5)
WBC CLUMPS URNS QL MICRO: ABNORMAL

## 2021-05-03 PROCEDURE — 87077 CULTURE AEROBIC IDENTIFY: CPT | Performed by: EMERGENCY MEDICINE

## 2021-05-03 PROCEDURE — 85025 COMPLETE CBC W/AUTO DIFF WBC: CPT | Performed by: EMERGENCY MEDICINE

## 2021-05-03 PROCEDURE — 63600175 PHARM REV CODE 636 W HCPCS: Performed by: EMERGENCY MEDICINE

## 2021-05-03 PROCEDURE — 99285 EMERGENCY DEPT VISIT HI MDM: CPT | Mod: 25,27

## 2021-05-03 PROCEDURE — 83605 ASSAY OF LACTIC ACID: CPT | Performed by: EMERGENCY MEDICINE

## 2021-05-03 PROCEDURE — 25000003 PHARM REV CODE 250: Performed by: NURSE PRACTITIONER

## 2021-05-03 PROCEDURE — S0073 INJECTION, AZTREONAM, 500 MG: HCPCS | Performed by: NURSE PRACTITIONER

## 2021-05-03 PROCEDURE — 87040 BLOOD CULTURE FOR BACTERIA: CPT | Performed by: EMERGENCY MEDICINE

## 2021-05-03 PROCEDURE — 36569 INSJ PICC 5 YR+ W/O IMAGING: CPT

## 2021-05-03 PROCEDURE — 87086 URINE CULTURE/COLONY COUNT: CPT | Performed by: EMERGENCY MEDICINE

## 2021-05-03 PROCEDURE — 87186 SC STD MICRODIL/AGAR DIL: CPT | Performed by: EMERGENCY MEDICINE

## 2021-05-03 PROCEDURE — C1751 CATH, INF, PER/CENT/MIDLINE: HCPCS

## 2021-05-03 PROCEDURE — 96365 THER/PROPH/DIAG IV INF INIT: CPT

## 2021-05-03 PROCEDURE — U0002 COVID-19 LAB TEST NON-CDC: HCPCS | Performed by: EMERGENCY MEDICINE

## 2021-05-03 PROCEDURE — 80053 COMPREHEN METABOLIC PANEL: CPT | Performed by: EMERGENCY MEDICINE

## 2021-05-03 PROCEDURE — 25000003 PHARM REV CODE 250: Performed by: HOSPITALIST

## 2021-05-03 PROCEDURE — 87088 URINE BACTERIA CULTURE: CPT | Performed by: EMERGENCY MEDICINE

## 2021-05-03 PROCEDURE — 63600175 PHARM REV CODE 636 W HCPCS: Performed by: NURSE PRACTITIONER

## 2021-05-03 PROCEDURE — 81000 URINALYSIS NONAUTO W/SCOPE: CPT | Performed by: EMERGENCY MEDICINE

## 2021-05-03 PROCEDURE — 11000001 HC ACUTE MED/SURG PRIVATE ROOM

## 2021-05-03 PROCEDURE — 99214 OFFICE O/P EST MOD 30 MIN: CPT | Mod: 25

## 2021-05-03 PROCEDURE — 25000003 PHARM REV CODE 250: Performed by: EMERGENCY MEDICINE

## 2021-05-03 RX ORDER — ATORVASTATIN CALCIUM 40 MG/1
40 TABLET, FILM COATED ORAL DAILY
Status: DISCONTINUED | OUTPATIENT
Start: 2021-05-04 | End: 2021-05-07 | Stop reason: HOSPADM

## 2021-05-03 RX ORDER — TAMSULOSIN HYDROCHLORIDE 0.4 MG/1
0.4 CAPSULE ORAL DAILY
Status: DISCONTINUED | OUTPATIENT
Start: 2021-05-04 | End: 2021-05-07 | Stop reason: HOSPADM

## 2021-05-03 RX ORDER — CLOPIDOGREL BISULFATE 75 MG/1
75 TABLET ORAL DAILY
Status: DISCONTINUED | OUTPATIENT
Start: 2021-05-04 | End: 2021-05-07 | Stop reason: HOSPADM

## 2021-05-03 RX ORDER — CARVEDILOL 3.12 MG/1
3.12 TABLET ORAL 2 TIMES DAILY WITH MEALS
Status: DISCONTINUED | OUTPATIENT
Start: 2021-05-03 | End: 2021-05-07 | Stop reason: HOSPADM

## 2021-05-03 RX ORDER — ZINC SULFATE 50(220)MG
440 CAPSULE ORAL 2 TIMES DAILY
Status: DISCONTINUED | OUTPATIENT
Start: 2021-05-03 | End: 2021-05-07 | Stop reason: HOSPADM

## 2021-05-03 RX ORDER — FUROSEMIDE 20 MG/1
20 TABLET ORAL DAILY
Status: DISCONTINUED | OUTPATIENT
Start: 2021-05-04 | End: 2021-05-07 | Stop reason: HOSPADM

## 2021-05-03 RX ORDER — MUPIROCIN 20 MG/G
OINTMENT TOPICAL 2 TIMES DAILY
Status: DISCONTINUED | OUTPATIENT
Start: 2021-05-03 | End: 2021-05-07 | Stop reason: HOSPADM

## 2021-05-03 RX ORDER — ONDANSETRON 2 MG/ML
4 INJECTION INTRAMUSCULAR; INTRAVENOUS EVERY 8 HOURS PRN
Status: DISCONTINUED | OUTPATIENT
Start: 2021-05-03 | End: 2021-05-07 | Stop reason: HOSPADM

## 2021-05-03 RX ORDER — TALC
6 POWDER (GRAM) TOPICAL NIGHTLY PRN
Status: DISCONTINUED | OUTPATIENT
Start: 2021-05-03 | End: 2021-05-07 | Stop reason: HOSPADM

## 2021-05-03 RX ORDER — POLYETHYLENE GLYCOL 3350 17 G/17G
17 POWDER, FOR SOLUTION ORAL 2 TIMES DAILY
Status: DISCONTINUED | OUTPATIENT
Start: 2021-05-03 | End: 2021-05-07 | Stop reason: HOSPADM

## 2021-05-03 RX ORDER — VANCOMYCIN HCL IN 5 % DEXTROSE 1G/250ML
1000 PLASTIC BAG, INJECTION (ML) INTRAVENOUS
Status: COMPLETED | OUTPATIENT
Start: 2021-05-03 | End: 2021-05-03

## 2021-05-03 RX ORDER — ASCORBIC ACID 500 MG
500 TABLET ORAL DAILY
Status: DISCONTINUED | OUTPATIENT
Start: 2021-05-04 | End: 2021-05-07 | Stop reason: HOSPADM

## 2021-05-03 RX ORDER — POTASSIUM CHLORIDE 20 MEQ/1
20 TABLET, EXTENDED RELEASE ORAL DAILY
Status: DISCONTINUED | OUTPATIENT
Start: 2021-05-04 | End: 2021-05-07 | Stop reason: HOSPADM

## 2021-05-03 RX ORDER — ACETAMINOPHEN 325 MG/1
650 TABLET ORAL EVERY 4 HOURS PRN
Status: DISCONTINUED | OUTPATIENT
Start: 2021-05-03 | End: 2021-05-07 | Stop reason: HOSPADM

## 2021-05-03 RX ORDER — NITROGLYCERIN 0.4 MG/1
0.4 TABLET SUBLINGUAL
Status: DISCONTINUED | OUTPATIENT
Start: 2021-05-03 | End: 2021-05-07 | Stop reason: HOSPADM

## 2021-05-03 RX ORDER — SODIUM CHLORIDE 0.9 % (FLUSH) 0.9 %
10 SYRINGE (ML) INJECTION
Status: DISCONTINUED | OUTPATIENT
Start: 2021-05-03 | End: 2021-05-07 | Stop reason: HOSPADM

## 2021-05-03 RX ORDER — PANTOPRAZOLE SODIUM 40 MG/1
40 TABLET, DELAYED RELEASE ORAL DAILY
Status: DISCONTINUED | OUTPATIENT
Start: 2021-05-04 | End: 2021-05-07 | Stop reason: HOSPADM

## 2021-05-03 RX ORDER — ONDANSETRON 8 MG/1
8 TABLET, ORALLY DISINTEGRATING ORAL EVERY 8 HOURS PRN
Status: DISCONTINUED | OUTPATIENT
Start: 2021-05-03 | End: 2021-05-07 | Stop reason: HOSPADM

## 2021-05-03 RX ORDER — SODIUM CHLORIDE 0.9 % (FLUSH) 0.9 %
10 SYRINGE (ML) INJECTION EVERY 6 HOURS
Status: DISCONTINUED | OUTPATIENT
Start: 2021-05-03 | End: 2021-05-07 | Stop reason: HOSPADM

## 2021-05-03 RX ORDER — OLOPATADINE HYDROCHLORIDE 1 MG/ML
1 SOLUTION/ DROPS OPHTHALMIC DAILY
Status: DISCONTINUED | OUTPATIENT
Start: 2021-05-04 | End: 2021-05-07 | Stop reason: HOSPADM

## 2021-05-03 RX ORDER — PREDNISOLONE ACETATE 10 MG/ML
1 SUSPENSION/ DROPS OPHTHALMIC 2 TIMES DAILY
Status: DISCONTINUED | OUTPATIENT
Start: 2021-05-03 | End: 2021-05-07 | Stop reason: HOSPADM

## 2021-05-03 RX ADMIN — MUPIROCIN: 20 OINTMENT TOPICAL at 10:05

## 2021-05-03 RX ADMIN — VANCOMYCIN HYDROCHLORIDE 1000 MG: 1 INJECTION, POWDER, LYOPHILIZED, FOR SOLUTION INTRAVENOUS at 03:05

## 2021-05-03 RX ADMIN — POLYETHYLENE GLYCOL 3350 17 G: 17 POWDER, FOR SOLUTION ORAL at 10:05

## 2021-05-03 RX ADMIN — ZINC SULFATE 220 MG (50 MG) CAPSULE 440 MG: CAPSULE at 10:05

## 2021-05-03 RX ADMIN — SODIUM CHLORIDE, SODIUM LACTATE, POTASSIUM CHLORIDE, AND CALCIUM CHLORIDE 250 ML: .6; .31; .03; .02 INJECTION, SOLUTION INTRAVENOUS at 07:05

## 2021-05-03 RX ADMIN — PREDNISOLONE ACETATE 1 DROP: 10 SUSPENSION/ DROPS OPHTHALMIC at 10:05

## 2021-05-03 RX ADMIN — AZTREONAM 1000 MG: 1 INJECTION, POWDER, LYOPHILIZED, FOR SOLUTION INTRAMUSCULAR; INTRAVENOUS at 10:05

## 2021-05-04 LAB
ANION GAP SERPL CALC-SCNC: 7 MMOL/L (ref 8–16)
BASOPHILS # BLD AUTO: 0.02 K/UL (ref 0–0.2)
BASOPHILS NFR BLD: 0.3 % (ref 0–1.9)
BUN SERPL-MCNC: 13 MG/DL (ref 10–30)
CALCIUM SERPL-MCNC: 8.8 MG/DL (ref 8.7–10.5)
CHLORIDE SERPL-SCNC: 105 MMOL/L (ref 95–110)
CO2 SERPL-SCNC: 26 MMOL/L (ref 23–29)
CREAT SERPL-MCNC: 0.7 MG/DL (ref 0.5–1.4)
DIFFERENTIAL METHOD: ABNORMAL
EOSINOPHIL # BLD AUTO: 0.1 K/UL (ref 0–0.5)
EOSINOPHIL NFR BLD: 1.3 % (ref 0–8)
ERYTHROCYTE [DISTWIDTH] IN BLOOD BY AUTOMATED COUNT: 18.6 % (ref 11.5–14.5)
EST. GFR  (AFRICAN AMERICAN): >60 ML/MIN/1.73 M^2
EST. GFR  (NON AFRICAN AMERICAN): >60 ML/MIN/1.73 M^2
GLUCOSE SERPL-MCNC: 84 MG/DL (ref 70–110)
HCT VFR BLD AUTO: 24.4 % (ref 37–48.5)
HGB BLD-MCNC: 7.7 G/DL (ref 12–16)
IMM GRANULOCYTES # BLD AUTO: 0.04 K/UL (ref 0–0.04)
IMM GRANULOCYTES NFR BLD AUTO: 0.7 % (ref 0–0.5)
LYMPHOCYTES # BLD AUTO: 1.7 K/UL (ref 1–4.8)
LYMPHOCYTES NFR BLD: 28.4 % (ref 18–48)
MCH RBC QN AUTO: 28.9 PG (ref 27–31)
MCHC RBC AUTO-ENTMCNC: 31.6 G/DL (ref 32–36)
MCV RBC AUTO: 92 FL (ref 82–98)
MONOCYTES # BLD AUTO: 0.3 K/UL (ref 0.3–1)
MONOCYTES NFR BLD: 5.3 % (ref 4–15)
NEUTROPHILS # BLD AUTO: 3.9 K/UL (ref 1.8–7.7)
NEUTROPHILS NFR BLD: 64 % (ref 38–73)
NRBC BLD-RTO: 0 /100 WBC
PLATELET # BLD AUTO: 154 K/UL (ref 150–450)
PMV BLD AUTO: 8.6 FL (ref 9.2–12.9)
POTASSIUM SERPL-SCNC: 4.1 MMOL/L (ref 3.5–5.1)
RBC # BLD AUTO: 2.66 M/UL (ref 4–5.4)
SODIUM SERPL-SCNC: 138 MMOL/L (ref 136–145)
WBC # BLD AUTO: 6.05 K/UL (ref 3.9–12.7)

## 2021-05-04 PROCEDURE — S0073 INJECTION, AZTREONAM, 500 MG: HCPCS | Performed by: NURSE PRACTITIONER

## 2021-05-04 PROCEDURE — 25000003 PHARM REV CODE 250: Performed by: HOSPITALIST

## 2021-05-04 PROCEDURE — 25000003 PHARM REV CODE 250: Performed by: FAMILY MEDICINE

## 2021-05-04 PROCEDURE — 85025 COMPLETE CBC W/AUTO DIFF WBC: CPT | Performed by: NURSE PRACTITIONER

## 2021-05-04 PROCEDURE — 63600175 PHARM REV CODE 636 W HCPCS: Performed by: FAMILY MEDICINE

## 2021-05-04 PROCEDURE — A4216 STERILE WATER/SALINE, 10 ML: HCPCS | Performed by: HOSPITALIST

## 2021-05-04 PROCEDURE — 11000001 HC ACUTE MED/SURG PRIVATE ROOM

## 2021-05-04 PROCEDURE — 25000003 PHARM REV CODE 250: Performed by: NURSE PRACTITIONER

## 2021-05-04 PROCEDURE — 80048 BASIC METABOLIC PNL TOTAL CA: CPT | Performed by: NURSE PRACTITIONER

## 2021-05-04 RX ORDER — METRONIDAZOLE 500 MG/1
500 TABLET ORAL EVERY 8 HOURS
Status: DISCONTINUED | OUTPATIENT
Start: 2021-05-04 | End: 2021-05-07 | Stop reason: HOSPADM

## 2021-05-04 RX ADMIN — CLOPIDOGREL 75 MG: 75 TABLET, FILM COATED ORAL at 10:05

## 2021-05-04 RX ADMIN — PREDNISOLONE ACETATE 1 DROP: 10 SUSPENSION/ DROPS OPHTHALMIC at 08:05

## 2021-05-04 RX ADMIN — PANTOPRAZOLE SODIUM 40 MG: 40 TABLET, DELAYED RELEASE ORAL at 10:05

## 2021-05-04 RX ADMIN — PREDNISOLONE ACETATE 1 DROP: 10 SUSPENSION/ DROPS OPHTHALMIC at 10:05

## 2021-05-04 RX ADMIN — FUROSEMIDE 20 MG: 20 TABLET ORAL at 10:05

## 2021-05-04 RX ADMIN — OXYCODONE HYDROCHLORIDE AND ACETAMINOPHEN 500 MG: 500 TABLET ORAL at 10:05

## 2021-05-04 RX ADMIN — CARVEDILOL 3.12 MG: 3.12 TABLET, FILM COATED ORAL at 05:05

## 2021-05-04 RX ADMIN — Medication 10 ML: at 05:05

## 2021-05-04 RX ADMIN — ATORVASTATIN CALCIUM 40 MG: 40 TABLET, FILM COATED ORAL at 10:05

## 2021-05-04 RX ADMIN — VANCOMYCIN HYDROCHLORIDE 500 MG: 500 INJECTION, POWDER, LYOPHILIZED, FOR SOLUTION INTRAVENOUS at 05:05

## 2021-05-04 RX ADMIN — Medication 10 ML: at 12:05

## 2021-05-04 RX ADMIN — ZINC SULFATE 220 MG (50 MG) CAPSULE 440 MG: CAPSULE at 08:05

## 2021-05-04 RX ADMIN — METRONIDAZOLE 500 MG: 500 TABLET ORAL at 05:05

## 2021-05-04 RX ADMIN — METRONIDAZOLE 500 MG: 500 TABLET ORAL at 10:05

## 2021-05-04 RX ADMIN — CEFTRIAXONE 1 G: 1 INJECTION, SOLUTION INTRAVENOUS at 05:05

## 2021-05-04 RX ADMIN — ZINC SULFATE 220 MG (50 MG) CAPSULE 440 MG: CAPSULE at 10:05

## 2021-05-04 RX ADMIN — MUPIROCIN: 20 OINTMENT TOPICAL at 08:05

## 2021-05-04 RX ADMIN — AZTREONAM 1000 MG: 1 INJECTION, POWDER, LYOPHILIZED, FOR SOLUTION INTRAMUSCULAR; INTRAVENOUS at 01:05

## 2021-05-04 RX ADMIN — AZTREONAM 1000 MG: 1 INJECTION, POWDER, LYOPHILIZED, FOR SOLUTION INTRAMUSCULAR; INTRAVENOUS at 05:05

## 2021-05-04 RX ADMIN — CARVEDILOL 3.12 MG: 3.12 TABLET, FILM COATED ORAL at 10:05

## 2021-05-04 RX ADMIN — POTASSIUM CHLORIDE 20 MEQ: 1500 TABLET, EXTENDED RELEASE ORAL at 10:05

## 2021-05-04 RX ADMIN — MUPIROCIN: 20 OINTMENT TOPICAL at 10:05

## 2021-05-04 RX ADMIN — POLYETHYLENE GLYCOL 3350 17 G: 17 POWDER, FOR SOLUTION ORAL at 10:05

## 2021-05-04 RX ADMIN — Medication 10 ML: at 01:05

## 2021-05-04 RX ADMIN — OLOPATADINE HYDROCHLORIDE 1 DROP: 1 SOLUTION/ DROPS OPHTHALMIC at 10:05

## 2021-05-04 RX ADMIN — TAMSULOSIN HYDROCHLORIDE 0.4 MG: 0.4 CAPSULE ORAL at 10:05

## 2021-05-05 PROBLEM — L89.154 PRESSURE INJURY OF SACRAL REGION, STAGE 4: Status: ACTIVE | Noted: 2021-05-03

## 2021-05-05 PROBLEM — S81.801S: Status: ACTIVE | Noted: 2021-05-05

## 2021-05-05 LAB
ANION GAP SERPL CALC-SCNC: 6 MMOL/L (ref 8–16)
BASOPHILS # BLD AUTO: 0.01 K/UL (ref 0–0.2)
BASOPHILS NFR BLD: 0.2 % (ref 0–1.9)
BUN SERPL-MCNC: 14 MG/DL (ref 10–30)
CALCIUM SERPL-MCNC: 8.9 MG/DL (ref 8.7–10.5)
CHLORIDE SERPL-SCNC: 106 MMOL/L (ref 95–110)
CO2 SERPL-SCNC: 28 MMOL/L (ref 23–29)
CREAT SERPL-MCNC: 0.7 MG/DL (ref 0.5–1.4)
DIFFERENTIAL METHOD: ABNORMAL
EOSINOPHIL # BLD AUTO: 0.1 K/UL (ref 0–0.5)
EOSINOPHIL NFR BLD: 1.1 % (ref 0–8)
ERYTHROCYTE [DISTWIDTH] IN BLOOD BY AUTOMATED COUNT: 18.2 % (ref 11.5–14.5)
EST. GFR  (AFRICAN AMERICAN): >60 ML/MIN/1.73 M^2
EST. GFR  (NON AFRICAN AMERICAN): >60 ML/MIN/1.73 M^2
GLUCOSE SERPL-MCNC: 84 MG/DL (ref 70–110)
HCT VFR BLD AUTO: 23.7 % (ref 37–48.5)
HGB BLD-MCNC: 7.6 G/DL (ref 12–16)
IMM GRANULOCYTES # BLD AUTO: 0.03 K/UL (ref 0–0.04)
IMM GRANULOCYTES NFR BLD AUTO: 0.6 % (ref 0–0.5)
LYMPHOCYTES # BLD AUTO: 1.4 K/UL (ref 1–4.8)
LYMPHOCYTES NFR BLD: 26.2 % (ref 18–48)
MCH RBC QN AUTO: 29.3 PG (ref 27–31)
MCHC RBC AUTO-ENTMCNC: 32.1 G/DL (ref 32–36)
MCV RBC AUTO: 92 FL (ref 82–98)
MONOCYTES # BLD AUTO: 0.3 K/UL (ref 0.3–1)
MONOCYTES NFR BLD: 4.7 % (ref 4–15)
NEUTROPHILS # BLD AUTO: 3.6 K/UL (ref 1.8–7.7)
NEUTROPHILS NFR BLD: 67.2 % (ref 38–73)
NRBC BLD-RTO: 0 /100 WBC
PLATELET # BLD AUTO: 145 K/UL (ref 150–450)
PMV BLD AUTO: 8.6 FL (ref 9.2–12.9)
POTASSIUM SERPL-SCNC: 4.2 MMOL/L (ref 3.5–5.1)
RBC # BLD AUTO: 2.59 M/UL (ref 4–5.4)
SODIUM SERPL-SCNC: 140 MMOL/L (ref 136–145)
VANCOMYCIN TROUGH SERPL-MCNC: 9 UG/ML (ref 10–22)
WBC # BLD AUTO: 5.3 K/UL (ref 3.9–12.7)

## 2021-05-05 PROCEDURE — 85025 COMPLETE CBC W/AUTO DIFF WBC: CPT | Performed by: NURSE PRACTITIONER

## 2021-05-05 PROCEDURE — 80202 ASSAY OF VANCOMYCIN: CPT | Performed by: FAMILY MEDICINE

## 2021-05-05 PROCEDURE — A4216 STERILE WATER/SALINE, 10 ML: HCPCS | Performed by: HOSPITALIST

## 2021-05-05 PROCEDURE — 25000003 PHARM REV CODE 250: Performed by: HOSPITALIST

## 2021-05-05 PROCEDURE — 80048 BASIC METABOLIC PNL TOTAL CA: CPT | Performed by: NURSE PRACTITIONER

## 2021-05-05 PROCEDURE — 11000001 HC ACUTE MED/SURG PRIVATE ROOM

## 2021-05-05 PROCEDURE — 25000003 PHARM REV CODE 250: Performed by: FAMILY MEDICINE

## 2021-05-05 PROCEDURE — 63600175 PHARM REV CODE 636 W HCPCS: Performed by: FAMILY MEDICINE

## 2021-05-05 PROCEDURE — 25000003 PHARM REV CODE 250: Performed by: NURSE PRACTITIONER

## 2021-05-05 RX ADMIN — ZINC SULFATE 220 MG (50 MG) CAPSULE 440 MG: CAPSULE at 09:05

## 2021-05-05 RX ADMIN — PANTOPRAZOLE SODIUM 40 MG: 40 TABLET, DELAYED RELEASE ORAL at 09:05

## 2021-05-05 RX ADMIN — CARVEDILOL 3.12 MG: 3.12 TABLET, FILM COATED ORAL at 06:05

## 2021-05-05 RX ADMIN — OXYCODONE HYDROCHLORIDE AND ACETAMINOPHEN 500 MG: 500 TABLET ORAL at 09:05

## 2021-05-05 RX ADMIN — METRONIDAZOLE 500 MG: 500 TABLET ORAL at 03:05

## 2021-05-05 RX ADMIN — CARVEDILOL 3.12 MG: 3.12 TABLET, FILM COATED ORAL at 08:05

## 2021-05-05 RX ADMIN — Medication 10 ML: at 06:05

## 2021-05-05 RX ADMIN — Medication 10 ML: at 05:05

## 2021-05-05 RX ADMIN — MUPIROCIN: 20 OINTMENT TOPICAL at 09:05

## 2021-05-05 RX ADMIN — Medication 10 ML: at 12:05

## 2021-05-05 RX ADMIN — METRONIDAZOLE 500 MG: 500 TABLET ORAL at 05:05

## 2021-05-05 RX ADMIN — MUPIROCIN: 20 OINTMENT TOPICAL at 08:05

## 2021-05-05 RX ADMIN — PREDNISOLONE ACETATE 1 DROP: 10 SUSPENSION/ DROPS OPHTHALMIC at 08:05

## 2021-05-05 RX ADMIN — CEFTRIAXONE 1 G: 1 INJECTION, SOLUTION INTRAVENOUS at 03:05

## 2021-05-05 RX ADMIN — METRONIDAZOLE 500 MG: 500 TABLET ORAL at 10:05

## 2021-05-05 RX ADMIN — CLOPIDOGREL 75 MG: 75 TABLET, FILM COATED ORAL at 09:05

## 2021-05-05 RX ADMIN — OLOPATADINE HYDROCHLORIDE 1 DROP: 1 SOLUTION/ DROPS OPHTHALMIC at 09:05

## 2021-05-05 RX ADMIN — Medication 10 ML: at 11:05

## 2021-05-05 RX ADMIN — VANCOMYCIN HYDROCHLORIDE 250 MG: 500 INJECTION, POWDER, LYOPHILIZED, FOR SOLUTION INTRAVENOUS at 06:05

## 2021-05-05 RX ADMIN — VANCOMYCIN HYDROCHLORIDE 500 MG: 500 INJECTION, POWDER, LYOPHILIZED, FOR SOLUTION INTRAVENOUS at 04:05

## 2021-05-05 RX ADMIN — PREDNISOLONE ACETATE 1 DROP: 10 SUSPENSION/ DROPS OPHTHALMIC at 09:05

## 2021-05-05 RX ADMIN — POTASSIUM CHLORIDE 20 MEQ: 1500 TABLET, EXTENDED RELEASE ORAL at 09:05

## 2021-05-05 RX ADMIN — FUROSEMIDE 20 MG: 20 TABLET ORAL at 09:05

## 2021-05-05 RX ADMIN — ZINC SULFATE 220 MG (50 MG) CAPSULE 440 MG: CAPSULE at 08:05

## 2021-05-05 RX ADMIN — TAMSULOSIN HYDROCHLORIDE 0.4 MG: 0.4 CAPSULE ORAL at 09:05

## 2021-05-05 RX ADMIN — ATORVASTATIN CALCIUM 40 MG: 40 TABLET, FILM COATED ORAL at 09:05

## 2021-05-06 LAB
ANION GAP SERPL CALC-SCNC: 6 MMOL/L (ref 8–16)
BACTERIA UR CULT: ABNORMAL
BASOPHILS # BLD AUTO: 0.01 K/UL (ref 0–0.2)
BASOPHILS NFR BLD: 0.2 % (ref 0–1.9)
BUN SERPL-MCNC: 12 MG/DL (ref 10–30)
CALCIUM SERPL-MCNC: 8.8 MG/DL (ref 8.7–10.5)
CHLORIDE SERPL-SCNC: 106 MMOL/L (ref 95–110)
CO2 SERPL-SCNC: 27 MMOL/L (ref 23–29)
CREAT SERPL-MCNC: 0.7 MG/DL (ref 0.5–1.4)
DIFFERENTIAL METHOD: ABNORMAL
EOSINOPHIL # BLD AUTO: 0.1 K/UL (ref 0–0.5)
EOSINOPHIL NFR BLD: 0.9 % (ref 0–8)
ERYTHROCYTE [DISTWIDTH] IN BLOOD BY AUTOMATED COUNT: 18.4 % (ref 11.5–14.5)
EST. GFR  (AFRICAN AMERICAN): >60 ML/MIN/1.73 M^2
EST. GFR  (NON AFRICAN AMERICAN): >60 ML/MIN/1.73 M^2
GLUCOSE SERPL-MCNC: 84 MG/DL (ref 70–110)
HCT VFR BLD AUTO: 24.6 % (ref 37–48.5)
HGB BLD-MCNC: 7.7 G/DL (ref 12–16)
IMM GRANULOCYTES # BLD AUTO: 0.03 K/UL (ref 0–0.04)
IMM GRANULOCYTES NFR BLD AUTO: 0.6 % (ref 0–0.5)
LYMPHOCYTES # BLD AUTO: 1.6 K/UL (ref 1–4.8)
LYMPHOCYTES NFR BLD: 30.2 % (ref 18–48)
MCH RBC QN AUTO: 28.5 PG (ref 27–31)
MCHC RBC AUTO-ENTMCNC: 31.3 G/DL (ref 32–36)
MCV RBC AUTO: 91 FL (ref 82–98)
MONOCYTES # BLD AUTO: 0.2 K/UL (ref 0.3–1)
MONOCYTES NFR BLD: 4.2 % (ref 4–15)
NEUTROPHILS # BLD AUTO: 3.4 K/UL (ref 1.8–7.7)
NEUTROPHILS NFR BLD: 63.9 % (ref 38–73)
NRBC BLD-RTO: 0 /100 WBC
PLATELET # BLD AUTO: 133 K/UL (ref 150–450)
PMV BLD AUTO: 8.5 FL (ref 9.2–12.9)
POTASSIUM SERPL-SCNC: 4.2 MMOL/L (ref 3.5–5.1)
RBC # BLD AUTO: 2.7 M/UL (ref 4–5.4)
SODIUM SERPL-SCNC: 139 MMOL/L (ref 136–145)
WBC # BLD AUTO: 5.29 K/UL (ref 3.9–12.7)

## 2021-05-06 PROCEDURE — A4216 STERILE WATER/SALINE, 10 ML: HCPCS | Performed by: HOSPITALIST

## 2021-05-06 PROCEDURE — 11000001 HC ACUTE MED/SURG PRIVATE ROOM

## 2021-05-06 PROCEDURE — 63600175 PHARM REV CODE 636 W HCPCS: Performed by: FAMILY MEDICINE

## 2021-05-06 PROCEDURE — 80048 BASIC METABOLIC PNL TOTAL CA: CPT | Performed by: NURSE PRACTITIONER

## 2021-05-06 PROCEDURE — 25000003 PHARM REV CODE 250: Performed by: HOSPITALIST

## 2021-05-06 PROCEDURE — 63600175 PHARM REV CODE 636 W HCPCS: Performed by: INTERNAL MEDICINE

## 2021-05-06 PROCEDURE — 25000003 PHARM REV CODE 250: Performed by: NURSE PRACTITIONER

## 2021-05-06 PROCEDURE — 85025 COMPLETE CBC W/AUTO DIFF WBC: CPT | Performed by: NURSE PRACTITIONER

## 2021-05-06 RX ORDER — CEFEPIME HYDROCHLORIDE 1 G/50ML
1 INJECTION, SOLUTION INTRAVENOUS
Status: DISCONTINUED | OUTPATIENT
Start: 2021-05-06 | End: 2021-05-07 | Stop reason: HOSPADM

## 2021-05-06 RX ADMIN — OXYCODONE HYDROCHLORIDE AND ACETAMINOPHEN 500 MG: 500 TABLET ORAL at 08:05

## 2021-05-06 RX ADMIN — FUROSEMIDE 20 MG: 20 TABLET ORAL at 08:05

## 2021-05-06 RX ADMIN — MUPIROCIN: 20 OINTMENT TOPICAL at 09:05

## 2021-05-06 RX ADMIN — POLYETHYLENE GLYCOL 3350 17 G: 17 POWDER, FOR SOLUTION ORAL at 09:05

## 2021-05-06 RX ADMIN — MUPIROCIN: 20 OINTMENT TOPICAL at 08:05

## 2021-05-06 RX ADMIN — METRONIDAZOLE 500 MG: 500 TABLET ORAL at 06:05

## 2021-05-06 RX ADMIN — PANTOPRAZOLE SODIUM 40 MG: 40 TABLET, DELAYED RELEASE ORAL at 08:05

## 2021-05-06 RX ADMIN — CEFEPIME HYDROCHLORIDE 1 G: 1 INJECTION, SOLUTION INTRAVENOUS at 10:05

## 2021-05-06 RX ADMIN — PREDNISOLONE ACETATE 1 DROP: 10 SUSPENSION/ DROPS OPHTHALMIC at 08:05

## 2021-05-06 RX ADMIN — Medication 10 ML: at 03:05

## 2021-05-06 RX ADMIN — METRONIDAZOLE 500 MG: 500 TABLET ORAL at 03:05

## 2021-05-06 RX ADMIN — Medication 10 ML: at 05:05

## 2021-05-06 RX ADMIN — PREDNISOLONE ACETATE 1 DROP: 10 SUSPENSION/ DROPS OPHTHALMIC at 09:05

## 2021-05-06 RX ADMIN — CARVEDILOL 3.12 MG: 3.12 TABLET, FILM COATED ORAL at 08:05

## 2021-05-06 RX ADMIN — CLOPIDOGREL 75 MG: 75 TABLET, FILM COATED ORAL at 08:05

## 2021-05-06 RX ADMIN — POTASSIUM CHLORIDE 20 MEQ: 1500 TABLET, EXTENDED RELEASE ORAL at 08:05

## 2021-05-06 RX ADMIN — ATORVASTATIN CALCIUM 40 MG: 40 TABLET, FILM COATED ORAL at 08:05

## 2021-05-06 RX ADMIN — OLOPATADINE HYDROCHLORIDE 1 DROP: 1 SOLUTION/ DROPS OPHTHALMIC at 08:05

## 2021-05-06 RX ADMIN — METRONIDAZOLE 500 MG: 500 TABLET ORAL at 09:05

## 2021-05-06 RX ADMIN — POLYETHYLENE GLYCOL 3350 17 G: 17 POWDER, FOR SOLUTION ORAL at 08:05

## 2021-05-06 RX ADMIN — ZINC SULFATE 220 MG (50 MG) CAPSULE 440 MG: CAPSULE at 08:05

## 2021-05-06 RX ADMIN — ZINC SULFATE 220 MG (50 MG) CAPSULE 440 MG: CAPSULE at 09:05

## 2021-05-06 RX ADMIN — CARVEDILOL 3.12 MG: 3.12 TABLET, FILM COATED ORAL at 04:05

## 2021-05-06 RX ADMIN — TAMSULOSIN HYDROCHLORIDE 0.4 MG: 0.4 CAPSULE ORAL at 08:05

## 2021-05-06 RX ADMIN — VANCOMYCIN HYDROCHLORIDE 750 MG: 750 INJECTION, POWDER, LYOPHILIZED, FOR SOLUTION INTRAVENOUS at 03:05

## 2021-05-06 RX ADMIN — Medication 10 ML: at 06:05

## 2021-05-07 ENCOUNTER — TELEPHONE (OUTPATIENT)
Dept: FAMILY MEDICINE | Facility: CLINIC | Age: 86
End: 2021-05-07

## 2021-05-07 VITALS
HEART RATE: 78 BPM | OXYGEN SATURATION: 100 % | DIASTOLIC BLOOD PRESSURE: 68 MMHG | BODY MASS INDEX: 19.15 KG/M2 | SYSTOLIC BLOOD PRESSURE: 107 MMHG | RESPIRATION RATE: 18 BRPM | TEMPERATURE: 98 F | WEIGHT: 95 LBS | HEIGHT: 59 IN

## 2021-05-07 LAB
ANION GAP SERPL CALC-SCNC: 6 MMOL/L (ref 8–16)
BASOPHILS # BLD AUTO: 0.01 K/UL (ref 0–0.2)
BASOPHILS NFR BLD: 0.2 % (ref 0–1.9)
BUN SERPL-MCNC: 12 MG/DL (ref 10–30)
CALCIUM SERPL-MCNC: 8.7 MG/DL (ref 8.7–10.5)
CHLORIDE SERPL-SCNC: 105 MMOL/L (ref 95–110)
CO2 SERPL-SCNC: 27 MMOL/L (ref 23–29)
CREAT SERPL-MCNC: 0.7 MG/DL (ref 0.5–1.4)
DIFFERENTIAL METHOD: ABNORMAL
EOSINOPHIL # BLD AUTO: 0 K/UL (ref 0–0.5)
EOSINOPHIL NFR BLD: 0.7 % (ref 0–8)
ERYTHROCYTE [DISTWIDTH] IN BLOOD BY AUTOMATED COUNT: 18.3 % (ref 11.5–14.5)
EST. GFR  (AFRICAN AMERICAN): >60 ML/MIN/1.73 M^2
EST. GFR  (NON AFRICAN AMERICAN): >60 ML/MIN/1.73 M^2
GLUCOSE SERPL-MCNC: 97 MG/DL (ref 70–110)
HCT VFR BLD AUTO: 24.2 % (ref 37–48.5)
HGB BLD-MCNC: 7.4 G/DL (ref 12–16)
IMM GRANULOCYTES # BLD AUTO: 0.02 K/UL (ref 0–0.04)
IMM GRANULOCYTES NFR BLD AUTO: 0.4 % (ref 0–0.5)
LYMPHOCYTES # BLD AUTO: 1.5 K/UL (ref 1–4.8)
LYMPHOCYTES NFR BLD: 26.8 % (ref 18–48)
MCH RBC QN AUTO: 28.4 PG (ref 27–31)
MCHC RBC AUTO-ENTMCNC: 30.6 G/DL (ref 32–36)
MCV RBC AUTO: 93 FL (ref 82–98)
MONOCYTES # BLD AUTO: 0.2 K/UL (ref 0.3–1)
MONOCYTES NFR BLD: 4.2 % (ref 4–15)
NEUTROPHILS # BLD AUTO: 3.7 K/UL (ref 1.8–7.7)
NEUTROPHILS NFR BLD: 67.7 % (ref 38–73)
NRBC BLD-RTO: 0 /100 WBC
PLATELET # BLD AUTO: 120 K/UL (ref 150–450)
PMV BLD AUTO: 8.5 FL (ref 9.2–12.9)
POTASSIUM SERPL-SCNC: 3.7 MMOL/L (ref 3.5–5.1)
RBC # BLD AUTO: 2.61 M/UL (ref 4–5.4)
SODIUM SERPL-SCNC: 138 MMOL/L (ref 136–145)
WBC # BLD AUTO: 5.44 K/UL (ref 3.9–12.7)

## 2021-05-07 PROCEDURE — 25000003 PHARM REV CODE 250: Performed by: NURSE PRACTITIONER

## 2021-05-07 PROCEDURE — 25000003 PHARM REV CODE 250: Performed by: HOSPITALIST

## 2021-05-07 PROCEDURE — 63600175 PHARM REV CODE 636 W HCPCS: Performed by: INTERNAL MEDICINE

## 2021-05-07 PROCEDURE — 85025 COMPLETE CBC W/AUTO DIFF WBC: CPT | Performed by: NURSE PRACTITIONER

## 2021-05-07 PROCEDURE — 80048 BASIC METABOLIC PNL TOTAL CA: CPT | Performed by: NURSE PRACTITIONER

## 2021-05-07 PROCEDURE — A4216 STERILE WATER/SALINE, 10 ML: HCPCS | Performed by: HOSPITALIST

## 2021-05-07 RX ORDER — CEFPODOXIME PROXETIL 200 MG/1
200 TABLET, FILM COATED ORAL 2 TIMES DAILY
Qty: 84 TABLET | Refills: 0 | Status: SHIPPED | OUTPATIENT
Start: 2021-05-07 | End: 2021-06-18

## 2021-05-07 RX ADMIN — ATORVASTATIN CALCIUM 40 MG: 40 TABLET, FILM COATED ORAL at 09:05

## 2021-05-07 RX ADMIN — CEFEPIME HYDROCHLORIDE 1 G: 1 INJECTION, SOLUTION INTRAVENOUS at 11:05

## 2021-05-07 RX ADMIN — Medication 10 ML: at 05:05

## 2021-05-07 RX ADMIN — FUROSEMIDE 20 MG: 20 TABLET ORAL at 09:05

## 2021-05-07 RX ADMIN — CLOPIDOGREL 75 MG: 75 TABLET, FILM COATED ORAL at 09:05

## 2021-05-07 RX ADMIN — METRONIDAZOLE 500 MG: 500 TABLET ORAL at 01:05

## 2021-05-07 RX ADMIN — METRONIDAZOLE 500 MG: 500 TABLET ORAL at 05:05

## 2021-05-07 RX ADMIN — OLOPATADINE HYDROCHLORIDE 1 DROP: 1 SOLUTION/ DROPS OPHTHALMIC at 09:05

## 2021-05-07 RX ADMIN — TAMSULOSIN HYDROCHLORIDE 0.4 MG: 0.4 CAPSULE ORAL at 09:05

## 2021-05-07 RX ADMIN — POLYETHYLENE GLYCOL 3350 17 G: 17 POWDER, FOR SOLUTION ORAL at 09:05

## 2021-05-07 RX ADMIN — MUPIROCIN: 20 OINTMENT TOPICAL at 09:05

## 2021-05-07 RX ADMIN — Medication 10 ML: at 12:05

## 2021-05-07 RX ADMIN — ZINC SULFATE 220 MG (50 MG) CAPSULE 440 MG: CAPSULE at 09:05

## 2021-05-07 RX ADMIN — PANTOPRAZOLE SODIUM 40 MG: 40 TABLET, DELAYED RELEASE ORAL at 09:05

## 2021-05-07 RX ADMIN — POTASSIUM CHLORIDE 20 MEQ: 1500 TABLET, EXTENDED RELEASE ORAL at 09:05

## 2021-05-07 RX ADMIN — OXYCODONE HYDROCHLORIDE AND ACETAMINOPHEN 500 MG: 500 TABLET ORAL at 09:05

## 2021-05-07 RX ADMIN — PREDNISOLONE ACETATE 1 DROP: 10 SUSPENSION/ DROPS OPHTHALMIC at 09:05

## 2021-05-07 RX ADMIN — CARVEDILOL 3.12 MG: 3.12 TABLET, FILM COATED ORAL at 09:05

## 2021-05-08 LAB
BACTERIA BLD CULT: NORMAL
BACTERIA BLD CULT: NORMAL

## 2021-05-11 ENCOUNTER — OFFICE VISIT (OUTPATIENT)
Dept: PRIMARY CARE CLINIC | Facility: CLINIC | Age: 86
End: 2021-05-11
Payer: MEDICARE

## 2021-05-11 DIAGNOSIS — N39.0 BACTERIAL URINARY TRACT INFECTION: ICD-10-CM

## 2021-05-11 DIAGNOSIS — L89.154 PRESSURE INJURY OF SACRAL REGION, STAGE 4: Primary | ICD-10-CM

## 2021-05-11 DIAGNOSIS — G30.1 LATE ONSET ALZHEIMER'S DISEASE WITHOUT BEHAVIORAL DISTURBANCE: ICD-10-CM

## 2021-05-11 DIAGNOSIS — F02.80 LATE ONSET ALZHEIMER'S DISEASE WITHOUT BEHAVIORAL DISTURBANCE: ICD-10-CM

## 2021-05-11 DIAGNOSIS — A49.9 BACTERIAL URINARY TRACT INFECTION: ICD-10-CM

## 2021-05-11 DIAGNOSIS — M86.68 CHRONIC OSTEOMYELITIS OF SACRUM: ICD-10-CM

## 2021-05-11 PROBLEM — R79.89 TROPONIN I ABOVE REFERENCE RANGE: Status: RESOLVED | Noted: 2019-01-27 | Resolved: 2021-05-11

## 2021-05-11 PROBLEM — R79.89 ELEVATED TROPONIN: Status: RESOLVED | Noted: 2017-02-22 | Resolved: 2021-05-11

## 2021-05-11 PROBLEM — R59.1 LYMPHADENOPATHY OF HEAD AND NECK: Status: RESOLVED | Noted: 2018-10-18 | Resolved: 2021-05-11

## 2021-05-11 PROBLEM — I63.9 ACUTE CVA (CEREBROVASCULAR ACCIDENT): Status: RESOLVED | Noted: 2021-04-18 | Resolved: 2021-05-11

## 2021-05-11 PROBLEM — N30.00 ACUTE CYSTITIS WITHOUT HEMATURIA: Status: RESOLVED | Noted: 2020-09-06 | Resolved: 2021-05-11

## 2021-05-11 PROBLEM — R10.9 ABDOMINAL PAIN: Status: RESOLVED | Noted: 2020-09-06 | Resolved: 2021-05-11

## 2021-05-11 PROBLEM — A41.50 SEPSIS DUE TO GRAM-NEGATIVE UTI: Status: RESOLVED | Noted: 2020-09-06 | Resolved: 2021-05-11

## 2021-05-11 PROBLEM — Z86.73 HISTORY OF CVA IN ADULTHOOD: Status: RESOLVED | Noted: 2018-02-20 | Resolved: 2021-05-11

## 2021-05-11 PROBLEM — S81.801S: Status: RESOLVED | Noted: 2021-05-05 | Resolved: 2021-05-11

## 2021-05-11 PROBLEM — A41.59 PROTEUS SEPTICEMIA: Status: RESOLVED | Noted: 2020-09-08 | Resolved: 2021-05-11

## 2021-05-11 PROBLEM — I63.9 STROKE: Status: RESOLVED | Noted: 2021-04-18 | Resolved: 2021-05-11

## 2021-05-11 PROBLEM — L89.153 PRESSURE ULCER OF SACRAL REGION, STAGE 3: Status: RESOLVED | Noted: 2018-06-27 | Resolved: 2021-05-11

## 2021-05-11 PROCEDURE — 1157F PR ADVANCE CARE PLAN OR EQUIV PRESENT IN MEDICAL RECORD: ICD-10-PCS | Mod: 95,,, | Performed by: INTERNAL MEDICINE

## 2021-05-11 PROCEDURE — 1159F MED LIST DOCD IN RCRD: CPT | Mod: 95,,, | Performed by: INTERNAL MEDICINE

## 2021-05-11 PROCEDURE — 99215 OFFICE O/P EST HI 40 MIN: CPT | Mod: 95,,, | Performed by: INTERNAL MEDICINE

## 2021-05-11 PROCEDURE — 1157F ADVNC CARE PLAN IN RCRD: CPT | Mod: 95,,, | Performed by: INTERNAL MEDICINE

## 2021-05-11 PROCEDURE — 99215 PR OFFICE/OUTPT VISIT, EST, LEVL V, 40-54 MIN: ICD-10-PCS | Mod: 95,,, | Performed by: INTERNAL MEDICINE

## 2021-05-11 PROCEDURE — 1159F PR MEDICATION LIST DOCUMENTED IN MEDICAL RECORD: ICD-10-PCS | Mod: 95,,, | Performed by: INTERNAL MEDICINE

## 2021-05-12 PROBLEM — I50.9 CHRONIC CONGESTIVE HEART FAILURE: Status: RESOLVED | Noted: 2018-10-18 | Resolved: 2021-05-12

## 2021-05-13 ENCOUNTER — TELEPHONE (OUTPATIENT)
Dept: FAMILY MEDICINE | Facility: CLINIC | Age: 86
End: 2021-05-13

## 2021-05-13 ENCOUNTER — OFFICE VISIT (OUTPATIENT)
Dept: FAMILY MEDICINE | Facility: CLINIC | Age: 86
End: 2021-05-13
Payer: MEDICARE

## 2021-05-13 VITALS
BODY MASS INDEX: 19.35 KG/M2 | HEART RATE: 66 BPM | DIASTOLIC BLOOD PRESSURE: 60 MMHG | OXYGEN SATURATION: 97 % | HEIGHT: 59 IN | SYSTOLIC BLOOD PRESSURE: 110 MMHG | WEIGHT: 96 LBS | RESPIRATION RATE: 18 BRPM

## 2021-05-13 DIAGNOSIS — L89.154 PRESSURE INJURY OF SACRAL REGION, STAGE 4: Primary | ICD-10-CM

## 2021-05-13 DIAGNOSIS — I10 ESSENTIAL HYPERTENSION: Primary | ICD-10-CM

## 2021-05-13 DIAGNOSIS — L97.219 VENOUS STASIS ULCER OF RIGHT CALF WITH VARICOSE VEINS, UNSPECIFIED ULCER STAGE: ICD-10-CM

## 2021-05-13 DIAGNOSIS — L89.154 PRESSURE INJURY OF SACRAL REGION, STAGE 4: ICD-10-CM

## 2021-05-13 DIAGNOSIS — I83.012 VENOUS STASIS ULCER OF RIGHT CALF WITH VARICOSE VEINS, UNSPECIFIED ULCER STAGE: ICD-10-CM

## 2021-05-13 DIAGNOSIS — I83.022 VENOUS STASIS ULCER OF LEFT CALF WITH VARICOSE VEINS, UNSPECIFIED ULCER STAGE: ICD-10-CM

## 2021-05-13 DIAGNOSIS — H10.13 ALLERGIC CONJUNCTIVITIS OF BOTH EYES: Primary | ICD-10-CM

## 2021-05-13 DIAGNOSIS — G30.1 LATE ONSET ALZHEIMER'S DISEASE WITHOUT BEHAVIORAL DISTURBANCE: ICD-10-CM

## 2021-05-13 DIAGNOSIS — D63.8 ANEMIA, CHRONIC DISEASE: ICD-10-CM

## 2021-05-13 DIAGNOSIS — M86.68 CHRONIC OSTEOMYELITIS OF SACRUM: ICD-10-CM

## 2021-05-13 DIAGNOSIS — I25.10 CAD IN NATIVE ARTERY: ICD-10-CM

## 2021-05-13 DIAGNOSIS — L97.229 VENOUS STASIS ULCER OF LEFT CALF WITH VARICOSE VEINS, UNSPECIFIED ULCER STAGE: ICD-10-CM

## 2021-05-13 DIAGNOSIS — I50.42 CHRONIC COMBINED SYSTOLIC AND DIASTOLIC HEART FAILURE: ICD-10-CM

## 2021-05-13 DIAGNOSIS — I42.8 NICM (NONISCHEMIC CARDIOMYOPATHY): ICD-10-CM

## 2021-05-13 DIAGNOSIS — F02.80 LATE ONSET ALZHEIMER'S DISEASE WITHOUT BEHAVIORAL DISTURBANCE: ICD-10-CM

## 2021-05-13 DIAGNOSIS — Z71.89 ADVANCED CARE PLANNING/COUNSELING DISCUSSION: ICD-10-CM

## 2021-05-13 DIAGNOSIS — D69.6 THROMBOCYTOPENIA, UNSPECIFIED: ICD-10-CM

## 2021-05-13 DIAGNOSIS — E78.2 MIXED HYPERLIPIDEMIA: ICD-10-CM

## 2021-05-13 DIAGNOSIS — I63.9 CEREBROVASCULAR ACCIDENT (CVA), UNSPECIFIED MECHANISM: ICD-10-CM

## 2021-05-13 DIAGNOSIS — K59.04 CHRONIC IDIOPATHIC CONSTIPATION: ICD-10-CM

## 2021-05-13 DIAGNOSIS — K21.9 GASTROESOPHAGEAL REFLUX DISEASE WITHOUT ESOPHAGITIS: ICD-10-CM

## 2021-05-13 DIAGNOSIS — C50.911 MALIGNANT NEOPLASM OF RIGHT FEMALE BREAST, UNSPECIFIED ESTROGEN RECEPTOR STATUS, UNSPECIFIED SITE OF BREAST: ICD-10-CM

## 2021-05-13 DIAGNOSIS — G47.09 OTHER INSOMNIA: ICD-10-CM

## 2021-05-13 PROBLEM — N39.0 BACTERIAL URINARY TRACT INFECTION: Status: RESOLVED | Noted: 2020-09-08 | Resolved: 2021-05-13

## 2021-05-13 PROBLEM — N63.15 BREAST LUMP ON RIGHT SIDE AT 12 O'CLOCK POSITION: Status: RESOLVED | Noted: 2018-06-26 | Resolved: 2021-05-13

## 2021-05-13 PROBLEM — A49.9 BACTERIAL URINARY TRACT INFECTION: Status: RESOLVED | Noted: 2020-09-08 | Resolved: 2021-05-13

## 2021-05-13 PROBLEM — F32.A DEPRESSION: Status: RESOLVED | Noted: 2018-10-18 | Resolved: 2021-05-13

## 2021-05-13 PROCEDURE — G0009 ADMIN PNEUMOCOCCAL VACCINE: HCPCS | Mod: S$GLB,,, | Performed by: FAMILY MEDICINE

## 2021-05-13 PROCEDURE — 1101F PR PT FALLS ASSESS DOC 0-1 FALLS W/OUT INJ PAST YR: ICD-10-PCS | Mod: CPTII,S$GLB,, | Performed by: FAMILY MEDICINE

## 2021-05-13 PROCEDURE — G0009 PNEUMOCOCCAL POLYSACCHARIDE VACCINE 23-VALENT =>2YO SQ IM: ICD-10-PCS | Mod: S$GLB,,, | Performed by: FAMILY MEDICINE

## 2021-05-13 PROCEDURE — 1101F PT FALLS ASSESS-DOCD LE1/YR: CPT | Mod: CPTII,S$GLB,, | Performed by: FAMILY MEDICINE

## 2021-05-13 PROCEDURE — 99999 PR PBB SHADOW E&M-EST. PATIENT-LVL V: CPT | Mod: PBBFAC,,, | Performed by: FAMILY MEDICINE

## 2021-05-13 PROCEDURE — 1159F MED LIST DOCD IN RCRD: CPT | Mod: S$GLB,,, | Performed by: FAMILY MEDICINE

## 2021-05-13 PROCEDURE — 99215 OFFICE O/P EST HI 40 MIN: CPT | Mod: 25,S$GLB,, | Performed by: FAMILY MEDICINE

## 2021-05-13 PROCEDURE — 1126F AMNT PAIN NOTED NONE PRSNT: CPT | Mod: S$GLB,,, | Performed by: FAMILY MEDICINE

## 2021-05-13 PROCEDURE — 99215 PR OFFICE/OUTPT VISIT, EST, LEVL V, 40-54 MIN: ICD-10-PCS | Mod: 25,S$GLB,, | Performed by: FAMILY MEDICINE

## 2021-05-13 PROCEDURE — 3288F PR FALLS RISK ASSESSMENT DOCUMENTED: ICD-10-PCS | Mod: CPTII,S$GLB,, | Performed by: FAMILY MEDICINE

## 2021-05-13 PROCEDURE — 1157F PR ADVANCE CARE PLAN OR EQUIV PRESENT IN MEDICAL RECORD: ICD-10-PCS | Mod: S$GLB,,, | Performed by: FAMILY MEDICINE

## 2021-05-13 PROCEDURE — 99999 PR PBB SHADOW E&M-EST. PATIENT-LVL V: ICD-10-PCS | Mod: PBBFAC,,, | Performed by: FAMILY MEDICINE

## 2021-05-13 PROCEDURE — 1157F ADVNC CARE PLAN IN RCRD: CPT | Mod: S$GLB,,, | Performed by: FAMILY MEDICINE

## 2021-05-13 PROCEDURE — 3288F FALL RISK ASSESSMENT DOCD: CPT | Mod: CPTII,S$GLB,, | Performed by: FAMILY MEDICINE

## 2021-05-13 PROCEDURE — 1159F PR MEDICATION LIST DOCUMENTED IN MEDICAL RECORD: ICD-10-PCS | Mod: S$GLB,,, | Performed by: FAMILY MEDICINE

## 2021-05-13 PROCEDURE — 90732 PNEUMOCOCCAL POLYSACCHARIDE VACCINE 23-VALENT =>2YO SQ IM: ICD-10-PCS | Mod: S$GLB,,, | Performed by: FAMILY MEDICINE

## 2021-05-13 PROCEDURE — 90732 PPSV23 VACC 2 YRS+ SUBQ/IM: CPT | Mod: S$GLB,,, | Performed by: FAMILY MEDICINE

## 2021-05-13 PROCEDURE — 1126F PR PAIN SEVERITY QUANTIFIED, NO PAIN PRESENT: ICD-10-PCS | Mod: S$GLB,,, | Performed by: FAMILY MEDICINE

## 2021-05-13 RX ORDER — ASPIRIN 81 MG/1
81 TABLET ORAL DAILY
Qty: 90 TABLET | Refills: 11 | Status: SHIPPED | OUTPATIENT
Start: 2021-05-13 | End: 2022-04-21

## 2021-05-13 RX ORDER — NITROGLYCERIN 0.4 MG/1
0.4 TABLET SUBLINGUAL EVERY 5 MIN PRN
Qty: 15 TABLET | Refills: 0 | Status: SHIPPED | OUTPATIENT
Start: 2021-05-13 | End: 2024-04-02 | Stop reason: SDUPTHER

## 2021-05-13 RX ORDER — POLYETHYLENE GLYCOL 3350 17 G/17G
17 POWDER, FOR SOLUTION ORAL DAILY
Qty: 60 EACH | Refills: 5 | Status: SHIPPED | OUTPATIENT
Start: 2021-05-13

## 2021-05-13 RX ORDER — TALC
6 POWDER (GRAM) TOPICAL NIGHTLY PRN
Qty: 60 TABLET | Refills: 5 | Status: SHIPPED | OUTPATIENT
Start: 2021-05-13

## 2021-05-13 RX ORDER — ASCORBIC ACID 500 MG
500 TABLET ORAL DAILY
Qty: 30 TABLET | Refills: 11 | Status: SHIPPED | OUTPATIENT
Start: 2021-05-13 | End: 2021-05-24

## 2021-05-13 RX ORDER — ZINC SULFATE 50(220)MG
220 CAPSULE ORAL 2 TIMES DAILY
Qty: 60 CAPSULE | Refills: 5 | Status: SHIPPED | OUTPATIENT
Start: 2021-05-13 | End: 2021-10-21

## 2021-05-13 RX ORDER — CARVEDILOL 3.12 MG/1
3.12 TABLET ORAL 2 TIMES DAILY WITH MEALS
Qty: 60 TABLET | Refills: 5 | Status: SHIPPED | OUTPATIENT
Start: 2021-05-13 | End: 2021-10-21

## 2021-05-13 RX ORDER — ATORVASTATIN CALCIUM 40 MG/1
40 TABLET, FILM COATED ORAL DAILY
Qty: 30 TABLET | Refills: 5 | Status: SHIPPED | OUTPATIENT
Start: 2021-05-13 | End: 2021-10-21

## 2021-05-13 RX ORDER — LISINOPRIL 2.5 MG/1
2.5 TABLET ORAL DAILY
COMMUNITY
End: 2022-03-21

## 2021-05-13 RX ORDER — LACTOBACILLUS COMBINATION NO.4 3B CELL
1 CAPSULE ORAL DAILY
Qty: 30 CAPSULE | Refills: 1 | Status: SHIPPED | OUTPATIENT
Start: 2021-05-13 | End: 2021-05-13

## 2021-05-13 RX ORDER — FOLIC ACID 1 MG/1
1 TABLET ORAL DAILY
COMMUNITY
End: 2022-03-21

## 2021-05-13 RX ORDER — LACTOBACILLUS COMBINATION NO.4 3B CELL
1 CAPSULE ORAL DAILY
Qty: 30 CAPSULE | Refills: 1 | Status: SHIPPED | OUTPATIENT
Start: 2021-05-13 | End: 2021-06-18

## 2021-05-13 RX ORDER — PANTOPRAZOLE SODIUM 20 MG/1
20 TABLET, DELAYED RELEASE ORAL DAILY
Qty: 30 TABLET | Refills: 5 | Status: SHIPPED | OUTPATIENT
Start: 2021-05-13 | End: 2021-12-02 | Stop reason: SDUPTHER

## 2021-05-13 RX ORDER — CLOPIDOGREL BISULFATE 75 MG/1
75 TABLET ORAL DAILY
Qty: 18 TABLET | Refills: 0 | OUTPATIENT
Start: 2021-05-13 | End: 2021-05-31

## 2021-05-13 RX ORDER — ASPIRIN 81 MG/1
81 TABLET ORAL DAILY
COMMUNITY
End: 2021-05-13 | Stop reason: SDUPTHER

## 2021-05-13 RX ORDER — PREDNISOLONE ACETATE 10 MG/ML
1 SUSPENSION/ DROPS OPHTHALMIC 2 TIMES DAILY
Qty: 5 ML | Refills: 0 | Status: CANCELLED | OUTPATIENT
Start: 2021-05-13

## 2021-05-13 RX ORDER — TAMSULOSIN HYDROCHLORIDE 0.4 MG/1
0.4 CAPSULE ORAL DAILY
Qty: 30 CAPSULE | Refills: 5 | OUTPATIENT
Start: 2021-05-13 | End: 2022-05-13

## 2021-05-13 RX ORDER — FUROSEMIDE 20 MG/1
20 TABLET ORAL DAILY
Qty: 30 TABLET | Refills: 5 | Status: SHIPPED | OUTPATIENT
Start: 2021-05-13 | End: 2021-10-21

## 2021-05-13 RX ORDER — POTASSIUM CHLORIDE 20 MEQ/1
20 TABLET, EXTENDED RELEASE ORAL DAILY
Qty: 30 TABLET | Refills: 5 | Status: SHIPPED | OUTPATIENT
Start: 2021-05-13 | End: 2021-10-21

## 2021-05-14 ENCOUNTER — TELEPHONE (OUTPATIENT)
Dept: FAMILY MEDICINE | Facility: CLINIC | Age: 86
End: 2021-05-14

## 2021-05-17 ENCOUNTER — HOSPITAL ENCOUNTER (OUTPATIENT)
Dept: WOUND CARE | Facility: HOSPITAL | Age: 86
Discharge: HOME | End: 2021-05-17
Attending: PREVENTIVE MEDICINE
Payer: MEDICARE

## 2021-05-17 VITALS
DIASTOLIC BLOOD PRESSURE: 51 MMHG | HEART RATE: 77 BPM | TEMPERATURE: 97 F | SYSTOLIC BLOOD PRESSURE: 93 MMHG | RESPIRATION RATE: 16 BRPM

## 2021-05-17 DIAGNOSIS — I10 ESSENTIAL HYPERTENSION: ICD-10-CM

## 2021-05-17 DIAGNOSIS — R53.81 DEBILITY: ICD-10-CM

## 2021-05-17 DIAGNOSIS — M86.68 CHRONIC OSTEOMYELITIS OF SACRUM: Primary | ICD-10-CM

## 2021-05-17 DIAGNOSIS — L89.153 PRESSURE ULCER OF SACRAL REGION, STAGE 3: ICD-10-CM

## 2021-05-17 PROCEDURE — 99214 OFFICE O/P EST MOD 30 MIN: CPT

## 2021-05-26 ENCOUNTER — TELEPHONE (OUTPATIENT)
Dept: FAMILY MEDICINE | Facility: CLINIC | Age: 86
End: 2021-05-26

## 2021-05-26 ENCOUNTER — PATIENT OUTREACH (OUTPATIENT)
Dept: ADMINISTRATIVE | Facility: HOSPITAL | Age: 86
End: 2021-05-26

## 2021-05-27 ENCOUNTER — TELEPHONE (OUTPATIENT)
Dept: FAMILY MEDICINE | Facility: CLINIC | Age: 86
End: 2021-05-27

## 2021-05-27 DIAGNOSIS — D50.8 IRON DEFICIENCY ANEMIA SECONDARY TO INADEQUATE DIETARY IRON INTAKE: Primary | ICD-10-CM

## 2021-05-27 RX ORDER — DOCUSATE SODIUM 100 MG/1
100 CAPSULE, LIQUID FILLED ORAL 2 TIMES DAILY PRN
Qty: 30 CAPSULE | Refills: 5 | Status: SHIPPED | OUTPATIENT
Start: 2021-05-27 | End: 2022-10-19

## 2021-05-27 RX ORDER — FERROUS GLUCONATE 324(38)MG
324 TABLET ORAL
Qty: 30 TABLET | Refills: 5 | Status: SHIPPED | OUTPATIENT
Start: 2021-05-27 | End: 2021-10-21

## 2021-05-28 ENCOUNTER — PATIENT OUTREACH (OUTPATIENT)
Dept: ADMINISTRATIVE | Facility: HOSPITAL | Age: 86
End: 2021-05-28

## 2021-06-07 ENCOUNTER — TELEPHONE (OUTPATIENT)
Dept: FAMILY MEDICINE | Facility: CLINIC | Age: 86
End: 2021-06-07

## 2021-06-07 DIAGNOSIS — R60.0 LOWER EXTREMITY EDEMA: Primary | ICD-10-CM

## 2021-06-14 ENCOUNTER — HOSPITAL ENCOUNTER (OUTPATIENT)
Dept: WOUND CARE | Facility: HOSPITAL | Age: 86
Discharge: HOME | End: 2021-06-14
Attending: PREVENTIVE MEDICINE
Payer: MEDICARE

## 2021-06-14 VITALS — WEIGHT: 96 LBS | BODY MASS INDEX: 19.35 KG/M2 | HEIGHT: 59 IN | TEMPERATURE: 98 F

## 2021-06-14 DIAGNOSIS — R53.81 DEBILITY: ICD-10-CM

## 2021-06-14 DIAGNOSIS — M86.68 CHRONIC OSTEOMYELITIS OF SACRUM: Primary | ICD-10-CM

## 2021-06-14 DIAGNOSIS — L89.153 PRESSURE ULCER OF SACRAL REGION, STAGE 3: ICD-10-CM

## 2021-06-14 DIAGNOSIS — I10 ESSENTIAL HYPERTENSION: ICD-10-CM

## 2021-06-14 PROCEDURE — 99213 OFFICE O/P EST LOW 20 MIN: CPT

## 2021-06-21 ENCOUNTER — PATIENT OUTREACH (OUTPATIENT)
Dept: ADMINISTRATIVE | Facility: HOSPITAL | Age: 86
End: 2021-06-21

## 2021-07-16 ENCOUNTER — HOSPITAL ENCOUNTER (OUTPATIENT)
Dept: WOUND CARE | Facility: HOSPITAL | Age: 86
Discharge: HOME | End: 2021-07-16
Attending: PREVENTIVE MEDICINE
Payer: MEDICARE

## 2021-07-16 VITALS
BODY MASS INDEX: 19.35 KG/M2 | TEMPERATURE: 98 F | DIASTOLIC BLOOD PRESSURE: 64 MMHG | HEIGHT: 59 IN | HEART RATE: 69 BPM | WEIGHT: 96 LBS | SYSTOLIC BLOOD PRESSURE: 136 MMHG

## 2021-07-16 DIAGNOSIS — I10 ESSENTIAL HYPERTENSION: ICD-10-CM

## 2021-07-16 DIAGNOSIS — R53.81 DEBILITY: ICD-10-CM

## 2021-07-16 DIAGNOSIS — M86.68 CHRONIC OSTEOMYELITIS OF SACRUM: ICD-10-CM

## 2021-07-16 DIAGNOSIS — L89.154 PRESSURE INJURY OF SACRAL REGION, STAGE 4: Primary | ICD-10-CM

## 2021-07-16 PROCEDURE — 87070 CULTURE OTHR SPECIMN AEROBIC: CPT | Performed by: PREVENTIVE MEDICINE

## 2021-07-16 PROCEDURE — 99214 OFFICE O/P EST MOD 30 MIN: CPT

## 2021-07-16 PROCEDURE — 87077 CULTURE AEROBIC IDENTIFY: CPT | Performed by: PREVENTIVE MEDICINE

## 2021-07-16 PROCEDURE — 87184 SC STD DISK METHOD PER PLATE: CPT | Performed by: PREVENTIVE MEDICINE

## 2021-07-16 PROCEDURE — 87186 SC STD MICRODIL/AGAR DIL: CPT | Mod: 59 | Performed by: PREVENTIVE MEDICINE

## 2021-07-16 RX ORDER — DALBAVANCIN 500 MG/25ML
1500 INJECTION, POWDER, FOR SOLUTION INTRAVENOUS ONCE
Qty: 75 ML | Refills: 0 | OUTPATIENT
Start: 2021-07-16 | End: 2021-07-16

## 2021-07-16 RX ORDER — LINEZOLID 600 MG/1
600 TABLET, FILM COATED ORAL EVERY 12 HOURS
Qty: 20 TABLET | Refills: 0 | Status: SHIPPED | OUTPATIENT
Start: 2021-07-16 | End: 2021-07-26

## 2021-07-21 LAB
BACTERIA SPEC AEROBE CULT: ABNORMAL
BACTERIA SPEC AEROBE CULT: ABNORMAL

## 2021-07-26 RX ORDER — CYCLOSPORINE 0.5 MG/ML
EMULSION OPHTHALMIC
Refills: 0 | OUTPATIENT
Start: 2021-07-26

## 2021-07-28 RX ORDER — PREDNISOLONE ACETATE 10 MG/ML
1 SUSPENSION/ DROPS OPHTHALMIC 2 TIMES DAILY
Qty: 5 ML | Refills: 0 | OUTPATIENT
Start: 2021-07-28

## 2021-08-12 ENCOUNTER — TELEPHONE (OUTPATIENT)
Dept: FAMILY MEDICINE | Facility: CLINIC | Age: 86
End: 2021-08-12

## 2021-08-20 PROBLEM — N12 PYELONEPHRITIS: Status: ACTIVE | Noted: 2021-08-20

## 2021-08-23 ENCOUNTER — TELEPHONE (OUTPATIENT)
Dept: FAMILY MEDICINE | Facility: CLINIC | Age: 86
End: 2021-08-23

## 2021-08-23 PROBLEM — N12 PYELONEPHRITIS: Status: RESOLVED | Noted: 2021-08-20 | Resolved: 2021-08-23

## 2021-08-24 ENCOUNTER — PATIENT OUTREACH (OUTPATIENT)
Dept: ADMINISTRATIVE | Facility: OTHER | Age: 86
End: 2021-08-24

## 2021-08-24 ENCOUNTER — OFFICE VISIT (OUTPATIENT)
Dept: UROLOGY | Facility: CLINIC | Age: 86
End: 2021-08-24
Payer: MEDICARE

## 2021-08-24 DIAGNOSIS — N17.9 AKI (ACUTE KIDNEY INJURY): ICD-10-CM

## 2021-08-24 DIAGNOSIS — N13.4 HYDROURETER ON LEFT: ICD-10-CM

## 2021-08-24 DIAGNOSIS — N13.30 HYDRONEPHROSIS OF LEFT KIDNEY: ICD-10-CM

## 2021-08-24 DIAGNOSIS — N30.01 ACUTE CYSTITIS WITH HEMATURIA: ICD-10-CM

## 2021-08-24 DIAGNOSIS — N20.0 BILATERAL NEPHROLITHIASIS: ICD-10-CM

## 2021-08-24 DIAGNOSIS — N20.1 LEFT URETERAL CALCULUS: Primary | ICD-10-CM

## 2021-08-24 PROCEDURE — 1160F PR REVIEW ALL MEDS BY PRESCRIBER/CLIN PHARMACIST DOCUMENTED: ICD-10-PCS | Mod: CPTII,95,, | Performed by: NURSE PRACTITIONER

## 2021-08-24 PROCEDURE — 99442 PR PHYSICIAN TELEPHONE EVALUATION 11-20 MIN: ICD-10-PCS | Mod: 95,,, | Performed by: NURSE PRACTITIONER

## 2021-08-24 PROCEDURE — 1157F PR ADVANCE CARE PLAN OR EQUIV PRESENT IN MEDICAL RECORD: ICD-10-PCS | Mod: CPTII,95,, | Performed by: NURSE PRACTITIONER

## 2021-08-24 PROCEDURE — 99442 PR PHYSICIAN TELEPHONE EVALUATION 11-20 MIN: CPT | Mod: 95,,, | Performed by: NURSE PRACTITIONER

## 2021-08-24 PROCEDURE — 1159F MED LIST DOCD IN RCRD: CPT | Mod: CPTII,95,, | Performed by: NURSE PRACTITIONER

## 2021-08-24 PROCEDURE — 1157F ADVNC CARE PLAN IN RCRD: CPT | Mod: CPTII,95,, | Performed by: NURSE PRACTITIONER

## 2021-08-24 PROCEDURE — 1160F RVW MEDS BY RX/DR IN RCRD: CPT | Mod: CPTII,95,, | Performed by: NURSE PRACTITIONER

## 2021-08-24 PROCEDURE — 1159F PR MEDICATION LIST DOCUMENTED IN MEDICAL RECORD: ICD-10-PCS | Mod: CPTII,95,, | Performed by: NURSE PRACTITIONER

## 2021-08-25 ENCOUNTER — TELEPHONE (OUTPATIENT)
Dept: UROLOGY | Facility: CLINIC | Age: 86
End: 2021-08-25

## 2021-08-25 DIAGNOSIS — N30.01 ACUTE CYSTITIS WITH HEMATURIA: Primary | ICD-10-CM

## 2021-08-25 RX ORDER — GRANULES FOR ORAL 3 G/1
3 POWDER ORAL ONCE
Qty: 3 G | Refills: 0 | Status: SHIPPED | OUTPATIENT
Start: 2021-08-25 | End: 2021-08-25

## 2021-09-12 PROCEDURE — G0179 MD RECERTIFICATION HHA PT: HCPCS | Mod: ,,, | Performed by: FAMILY MEDICINE

## 2021-09-12 PROCEDURE — G0179 PR HOME HEALTH MD RECERTIFICATION: ICD-10-PCS | Mod: ,,, | Performed by: FAMILY MEDICINE

## 2021-09-20 ENCOUNTER — EXTERNAL HOME HEALTH (OUTPATIENT)
Dept: HOME HEALTH SERVICES | Facility: HOSPITAL | Age: 86
End: 2021-09-20
Payer: MEDICARE

## 2021-09-28 ENCOUNTER — DOCUMENT SCAN (OUTPATIENT)
Dept: HOME HEALTH SERVICES | Facility: HOSPITAL | Age: 86
End: 2021-09-28
Payer: MEDICARE

## 2021-10-15 ENCOUNTER — TELEPHONE (OUTPATIENT)
Dept: FAMILY MEDICINE | Facility: CLINIC | Age: 86
End: 2021-10-15

## 2021-10-18 ENCOUNTER — TELEPHONE (OUTPATIENT)
Dept: FAMILY MEDICINE | Facility: CLINIC | Age: 86
End: 2021-10-18

## 2021-10-18 DIAGNOSIS — N30.00 ACUTE CYSTITIS WITHOUT HEMATURIA: Primary | ICD-10-CM

## 2021-10-21 DIAGNOSIS — D50.8 IRON DEFICIENCY ANEMIA SECONDARY TO INADEQUATE DIETARY IRON INTAKE: ICD-10-CM

## 2021-10-21 DIAGNOSIS — L89.154 PRESSURE INJURY OF SACRAL REGION, STAGE 4: ICD-10-CM

## 2021-10-21 DIAGNOSIS — I25.10 CAD IN NATIVE ARTERY: ICD-10-CM

## 2021-10-21 DIAGNOSIS — I50.42 CHRONIC COMBINED SYSTOLIC AND DIASTOLIC HEART FAILURE: ICD-10-CM

## 2021-10-21 RX ORDER — ZINC SULFATE 50(220)MG
CAPSULE ORAL
Qty: 60 CAPSULE | Refills: 11 | Status: SHIPPED | OUTPATIENT
Start: 2021-10-21 | End: 2022-10-19

## 2021-10-21 RX ORDER — POTASSIUM CHLORIDE 20 MEQ/1
TABLET, EXTENDED RELEASE ORAL
Qty: 30 TABLET | Refills: 11 | Status: SHIPPED | OUTPATIENT
Start: 2021-10-21 | End: 2022-10-19 | Stop reason: SDUPTHER

## 2021-10-21 RX ORDER — FERROUS GLUCONATE 324(38)MG
TABLET ORAL
Qty: 30 TABLET | Refills: 11 | Status: SHIPPED | OUTPATIENT
Start: 2021-10-21 | End: 2022-10-19 | Stop reason: SDUPTHER

## 2021-10-21 RX ORDER — CARVEDILOL 3.12 MG/1
TABLET ORAL
Qty: 60 TABLET | Refills: 11 | Status: SHIPPED | OUTPATIENT
Start: 2021-10-21 | End: 2022-10-19 | Stop reason: SDUPTHER

## 2021-10-21 RX ORDER — FUROSEMIDE 20 MG/1
TABLET ORAL
Qty: 30 TABLET | Refills: 11 | Status: SHIPPED | OUTPATIENT
Start: 2021-10-21 | End: 2022-10-19 | Stop reason: SDUPTHER

## 2021-10-21 RX ORDER — TAMSULOSIN HYDROCHLORIDE 0.4 MG/1
CAPSULE ORAL
Qty: 30 CAPSULE | Refills: 11 | Status: SHIPPED | OUTPATIENT
Start: 2021-10-21 | End: 2022-10-19

## 2021-10-21 RX ORDER — ATORVASTATIN CALCIUM 40 MG/1
TABLET, FILM COATED ORAL
Qty: 30 TABLET | Refills: 11 | Status: SHIPPED | OUTPATIENT
Start: 2021-10-21 | End: 2022-10-19

## 2021-10-22 ENCOUNTER — TELEPHONE (OUTPATIENT)
Dept: FAMILY MEDICINE | Facility: CLINIC | Age: 86
End: 2021-10-22

## 2021-10-22 ENCOUNTER — TELEPHONE (OUTPATIENT)
Dept: EMERGENCY MEDICINE | Facility: HOSPITAL | Age: 86
End: 2021-10-22

## 2021-10-22 RX ORDER — CEFDINIR 300 MG/1
300 CAPSULE ORAL 2 TIMES DAILY
Qty: 20 CAPSULE | Refills: 0 | Status: SHIPPED | OUTPATIENT
Start: 2021-10-22 | End: 2021-11-01

## 2021-10-25 ENCOUNTER — TELEPHONE (OUTPATIENT)
Dept: FAMILY MEDICINE | Facility: CLINIC | Age: 86
End: 2021-10-25
Payer: MEDICARE

## 2021-10-25 RX ORDER — ACETAMINOPHEN 325 MG/1
325 TABLET ORAL EVERY 6 HOURS PRN
Qty: 30 TABLET | Refills: 11 | Status: SHIPPED | OUTPATIENT
Start: 2021-10-25 | End: 2022-10-19 | Stop reason: SDUPTHER

## 2021-10-28 ENCOUNTER — TELEPHONE (OUTPATIENT)
Dept: FAMILY MEDICINE | Facility: CLINIC | Age: 86
End: 2021-10-28
Payer: MEDICARE

## 2021-11-11 PROCEDURE — G0179 PR HOME HEALTH MD RECERTIFICATION: ICD-10-PCS | Mod: ,,, | Performed by: FAMILY MEDICINE

## 2021-11-11 PROCEDURE — G0179 MD RECERTIFICATION HHA PT: HCPCS | Mod: ,,, | Performed by: FAMILY MEDICINE

## 2021-11-23 ENCOUNTER — EXTERNAL HOME HEALTH (OUTPATIENT)
Dept: HOME HEALTH SERVICES | Facility: HOSPITAL | Age: 86
End: 2021-11-23
Payer: MEDICARE

## 2021-12-02 DIAGNOSIS — K21.9 GASTROESOPHAGEAL REFLUX DISEASE WITHOUT ESOPHAGITIS: ICD-10-CM

## 2021-12-02 RX ORDER — PANTOPRAZOLE SODIUM 20 MG/1
20 TABLET, DELAYED RELEASE ORAL DAILY
Qty: 30 TABLET | Refills: 5 | Status: SHIPPED | OUTPATIENT
Start: 2021-12-02 | End: 2022-04-21

## 2021-12-07 NOTE — PLAN OF CARE
No change in respiratory status, will continue to monitor.   Albendazole Pregnancy And Lactation Text: This medication is Pregnancy Category C and it isn't known if it is safe during pregnancy. It is also excreted in breast milk.

## 2022-01-20 DIAGNOSIS — H10.13 ALLERGIC CONJUNCTIVITIS OF BOTH EYES: ICD-10-CM

## 2022-01-20 RX ORDER — CYCLOSPORINE 0.5 MG/ML
EMULSION OPHTHALMIC
Refills: 0 | OUTPATIENT
Start: 2022-01-20

## 2022-01-31 ENCOUNTER — HOSPITAL ENCOUNTER (OUTPATIENT)
Dept: WOUND CARE | Facility: HOSPITAL | Age: 87
Discharge: HOME | End: 2022-01-31
Attending: PREVENTIVE MEDICINE
Payer: MEDICARE

## 2022-01-31 VITALS
SYSTOLIC BLOOD PRESSURE: 108 MMHG | DIASTOLIC BLOOD PRESSURE: 59 MMHG | HEART RATE: 83 BPM | WEIGHT: 95 LBS | BODY MASS INDEX: 19.15 KG/M2 | TEMPERATURE: 98 F | HEIGHT: 59 IN

## 2022-01-31 DIAGNOSIS — R53.81 DEBILITY: ICD-10-CM

## 2022-01-31 DIAGNOSIS — L89.154 PRESSURE INJURY OF SACRAL REGION, STAGE 4: Primary | ICD-10-CM

## 2022-01-31 DIAGNOSIS — M86.68 CHRONIC OSTEOMYELITIS OF SACRUM: ICD-10-CM

## 2022-01-31 PROCEDURE — 87077 CULTURE AEROBIC IDENTIFY: CPT | Mod: 59 | Performed by: PREVENTIVE MEDICINE

## 2022-01-31 PROCEDURE — 99203 OFFICE O/P NEW LOW 30 MIN: CPT

## 2022-01-31 PROCEDURE — 87076 CULTURE ANAEROBE IDENT EACH: CPT | Performed by: PREVENTIVE MEDICINE

## 2022-01-31 PROCEDURE — 87186 SC STD MICRODIL/AGAR DIL: CPT | Performed by: PREVENTIVE MEDICINE

## 2022-01-31 PROCEDURE — 87075 CULTR BACTERIA EXCEPT BLOOD: CPT | Performed by: PREVENTIVE MEDICINE

## 2022-01-31 PROCEDURE — 87070 CULTURE OTHR SPECIMN AEROBIC: CPT | Performed by: PREVENTIVE MEDICINE

## 2022-01-31 NOTE — PROGRESS NOTES
Subjective:       Patient ID: Gloria Zuleta is a 89 y.o. female.    Chief Complaint: Pressure Ulcer    1/31/22:  Readmit to the [facility] with chronic, stage IV decubitus ulcer.  Patients last visit was in July, 2021.  She resided in [facility] assisted living. Family reports renting a van to get her to the  appointment.  Instructed cg on [employer] transportation may be covered by her insurance.  Cg will call insurance for transportation assist for future appointments.  PMH: CVA, Dementia, debility, venous stasis, CAD, HTN, hyperlipidemia, HF, SVEN, chronic osteomyelitis of sacrum, lymphoma, breast CA and anemia.  Instructed patient/Cg not to sit for long periods of time and reposition every 2 hours.  Verbalized understanding.  Culture obtained.  Change in POC noted in orders. Care tolerated well.  Updated orders sent to [facility].      Review of Systems   All other systems reviewed and are negative.        Objective:      Temp:  [98.1 °F (36.7 °C)]   Pulse:  [83]   BP: (108)/(59)   Physical Exam       Altered Skin Integrity 04/18/21 1550 Coccyx Full thickness tissue loss with exposed bone, tendon, or muscle. Often includes undermining and tunneling. May extend into muscle and/or supporting structures. (Active)   04/18/21 1550   Altered Skin Integrity Present on Admission: yes   Side:    Orientation:    Location: Coccyx   Wound Number:    Is this injury device related?:    Primary Wound Type:    Description of Altered Skin Integrity: Full thickness tissue loss with exposed bone, tendon, or muscle. Often includes undermining and tunneling. May extend into muscle and/or supporting structures.   Removal Indication and Assessment:    Wound Outcome: Palliative   (Retired) Wound Length (cm):    (Retired) Wound Width (cm):    (Retired) Depth (cm):    Wound Description (Comments):    Removal Indications:    Wound Image   01/31/22 1000   Description of Altered Skin Integrity Full thickness tissue loss. Subcutaneous fat may be  visible but bone, tendon or muscle are not exposed 01/31/22 1000   Dressing Appearance Intact;Moist drainage 01/31/22 1000   Drainage Amount Moderate 01/31/22 1000   Drainage Characteristics/Odor Serosanguineous 01/31/22 1000   Appearance Pink;Red;Moist 01/31/22 1000   Tissue loss description Full thickness 01/31/22 1000   Red (%), Wound Tissue Color 100 % 01/31/22 1000   Periwound Area Intact;[place] 01/31/22 1000   Wound Edges Open 01/31/22 1000   Wound Length (cm) 2.8 cm 01/31/22 1000   Wound Width (cm) 1.2 cm 01/31/22 1000   Wound Depth (cm) 1 cm 01/31/22 1000   Wound Volume (cm^3) 3.36 cm^3 01/31/22 1000   Wound Surface Area (cm^2) 3.36 cm^2 01/31/22 1000   Undermining (depth (cm)/location) 3.4cm from 12-12 Oclock 01/31/22 1000   Care Cleansed with:;Sterile normal saline;Antimicrobial agent 01/31/22 1000   Dressing Applied;Calcium alginate;Island/border 01/31/22 1000   Packing packed with;other (see comment) 01/31/22 1000   Packing Inserted  2 01/31/22 1000   Periwound Care Absorptive dressing applied;Skin barrier film applied 01/31/22 1000   Dressing Change Due 02/02/22 01/31/22 1000         Assessment:         ICD-10-CM ICD-9-CM   1. Pressure injury of sacral region, stage 4  L89.154 707.03     707.24   2. Chronic osteomyelitis of sacrum  M86.68 730.18   3. Debility  R53.81 799.3         Plan:   Tissue pathology and/or culture taken:  [x] Yes [] No   Sharp debridement performed:   [] Yes [x] No   Labs ordered this visit:   [] Yes [x] No   Imaging ordered this visit:   [] Yes [x] No           Orders Placed This Encounter   Procedures    Anaerobic culture          Aerobic culture          Change dressing     Sacral Wound   Cleanse wound with: Vashe, rinse with Normal Saline   Lidocaine:PRN   Silver nitrate:PRN   Periwound care:Cavilon   Primary dressing:Saline moistened hydrofera classic gently pack to wound bed and undermining.   Secondary dressing: Aquacel extra folded, Sacral [place] border  dressing.  Offloading: Roho Cushion while sitting in wheel chair, Low airloss mattress while lying in bed. Change position  2 hours while lying in bed.   Culture obtained    Bilateral Lower Leg  Primary dressing:MediHoney to any open areas   Secondary dressing: Island border dressing   Edema Control: Tubi  E toes to knee BLE. Elevate BLE as much as possible while lying in bed. Change position every 2 hours.     Frequency: Mon, Wed, fri and PRN.     Follow-up: 4 weeks 2/28/22 with      Community Health:[facility]. Continue Home Health and  provide wound care and dressing changes as above. Please assist patient with proper inflation of Roho Cushion.        Follow up in about 1 month (around 2/28/2022).

## 2022-02-04 LAB
BACTERIA SPEC AEROBE CULT: ABNORMAL
BACTERIA SPEC AEROBE CULT: ABNORMAL

## 2022-02-07 LAB
BACTERIA SPEC ANAEROBE CULT: ABNORMAL

## 2022-02-15 RX ORDER — CYCLOSPORINE 0.5 MG/ML
1 EMULSION OPHTHALMIC 2 TIMES DAILY
OUTPATIENT
Start: 2022-02-15

## 2022-02-15 NOTE — TELEPHONE ENCOUNTER
----- Message from Margie Roth sent at 2/15/2022 10:16 AM CST -----  Regarding: RADHA RODRIGUEZ  Contact: [phone]/[facility]  Type:  RX Refill Request    Who Called:  [facility]  Refill or New Rx: New  RX Name and Strength: RESTASIS 0.05 % ophthalmic emulsion  How is the patient currently taking it? (ex. 1XDay): Place 1 drop into both eyes 2 (two) times a day. - Both Eyes  Is this a 30 day or 90 day RX: Qty: 60  Preferred Pharmacy with phone number: [facility] - [place] - [address]  Local or Mail Order: local   Ordering Provider:   Would the patient rather a call back or a response via [facility]?  call  Best Call Back Number: [phone]/[facility]  Additional Information:            Patient has just been admitted to [facility], please sign medication orders if agreed. To 's in basket.

## 2022-02-23 RX ORDER — CYCLOSPORINE 0.5 MG/ML
1 EMULSION OPHTHALMIC 2 TIMES DAILY
OUTPATIENT
Start: 2022-02-23

## 2022-02-23 NOTE — TELEPHONE ENCOUNTER
This medication is prescribed an Ophthalmologist because it needs routine eye exams to continue use. I do not prescribe this medication  Dr. Betty Oakes D.O.   Family Medicine

## 2022-02-23 NOTE — TELEPHONE ENCOUNTER
----- Message from Nasrin Celestin sent at 2/23/2022 10:39 AM CST -----  Contact: [phone]  Who Called: Smita foster  Regarding: f/u with refill request    Would the patient rather a call back or a response via [facility]? Call back  Best Call Back Number: [phone]  Additional Information:

## 2022-02-24 RX ORDER — CYCLOSPORINE 0.5 MG/ML
1 EMULSION OPHTHALMIC 2 TIMES DAILY
Qty: 60 EACH | Refills: 5 | Status: SHIPPED | OUTPATIENT
Start: 2022-02-24

## 2022-02-24 NOTE — TELEPHONE ENCOUNTER
----- Message from Katarina Guzman sent at 2/24/2022  9:45 AM CST -----  Regarding: Referral Opthamologist (Terese Johnson)  Type:  Needs Medical Advice    Who Called: pt son    Symptoms (please be specific): red eyes    Would the patient rather a call back or a response via [facility]? Call    Best Call Back Number: [phone]

## 2022-02-28 ENCOUNTER — HOSPITAL ENCOUNTER (OUTPATIENT)
Dept: WOUND CARE | Facility: HOSPITAL | Age: 87
Discharge: HOME | End: 2022-02-28
Attending: PREVENTIVE MEDICINE
Payer: MEDICARE

## 2022-02-28 VITALS
HEART RATE: 77 BPM | BODY MASS INDEX: 19.76 KG/M2 | SYSTOLIC BLOOD PRESSURE: 172 MMHG | HEIGHT: 59 IN | DIASTOLIC BLOOD PRESSURE: 96 MMHG | TEMPERATURE: 98 F | WEIGHT: 98 LBS

## 2022-02-28 DIAGNOSIS — L89.154 PRESSURE INJURY OF SACRAL REGION, STAGE 4: Primary | ICD-10-CM

## 2022-02-28 DIAGNOSIS — I25.10 CAD IN NATIVE ARTERY: ICD-10-CM

## 2022-02-28 DIAGNOSIS — M86.68 CHRONIC OSTEOMYELITIS OF SACRUM: ICD-10-CM

## 2022-02-28 DIAGNOSIS — R53.81 DEBILITY: ICD-10-CM

## 2022-02-28 PROCEDURE — 99213 OFFICE O/P EST LOW 20 MIN: CPT | Mod: HCNC

## 2022-02-28 NOTE — PROGRESS NOTES
Subjective:       Patient ID: Gloria Zuleta is a 89 y.o. female.    Chief Complaint: Pressure Ulcer    2/28/22:  F/U with Dr. Reyes for sacral pressure wound.  No new complaints.  Undermining measuring less.  Continue POC.  Care tolerated well.  Updated orders sent to .      Review of Systems   All other systems reviewed and are negative.        Objective:      Temp:  [98.2 °F (36.8 °C)]   Pulse:  [77]   BP: (172)/(96)   Physical Exam       Altered Skin Integrity 04/18/21 1550 Coccyx Full thickness tissue loss with exposed bone, tendon, or muscle. Often includes undermining and tunneling. May extend into muscle and/or supporting structures. (Active)   04/18/21 1550   Altered Skin Integrity Present on Admission: yes   Side:    Orientation:    Location: Coccyx   Wound Number:    Is this injury device related?:    Primary Wound Type:    Description of Altered Skin Integrity: Full thickness tissue loss with exposed bone, tendon, or muscle. Often includes undermining and tunneling. May extend into muscle and/or supporting structures.   Removal Indication and Assessment:    Wound Outcome: Palliative   (Retired) Wound Length (cm):    (Retired) Wound Width (cm):    (Retired) Depth (cm):    Wound Description (Comments):    Removal Indications:    Wound Image   02/28/22 0900   Description of Altered Skin Integrity Full thickness tissue loss with exposed bone, tendon, or muscle. Often includes undermining and tunneling. May extend into muscle and/or supporting structures. 02/28/22 0900   Dressing Appearance Intact;Moist drainage 02/28/22 0900   Drainage Amount Moderate 02/28/22 0900   Drainage Characteristics/Odor Serosanguineous 02/28/22 0900   Appearance Pink;Red;Moist 02/28/22 0900   Tissue loss description Full thickness 02/28/22 0900   Red (%), Wound Tissue Color 100 % 02/28/22 0900   Periwound Area [place];Moist 02/28/22 0900   Wound Edges Open 02/28/22 0900   Wound Length (cm) 3 cm 02/28/22 0900   Wound Width (cm)  1.5 cm 02/28/22 0900   Wound Depth (cm) 1.4 cm 02/28/22 0900   Wound Volume (cm^3) 6.3 cm^3 02/28/22 0900   Wound Surface Area (cm^2) 4.5 cm^2 02/28/22 0900   Undermining (depth (cm)/location) 2.7cm from 7--5 oclock 02/28/22 0900   Care Cleansed with:;Sterile normal saline;Antimicrobial agent 02/28/22 0900   Dressing Applied;Changed;Calcium alginate;Absorptive Pad;Island/border 02/28/22 0900   Packing packing removed;packed with 02/28/22 0900   Packing Inserted  1 02/28/22 0900   Packing Removed 1 02/28/22 0900   Periwound Care Absorptive dressing applied;Skin barrier film applied 02/28/22 0900   Dressing Change Due 03/02/22 02/28/22 0900         Assessment:         ICD-10-CM ICD-9-CM   1. Pressure injury of sacral region, stage 4  L89.154 707.03     707.24   2. Chronic osteomyelitis of sacrum  M86.68 730.18   3. Debility  R53.81 799.3   4. CAD in native artery  I25.10 414.01         Plan:   Tissue pathology and/or culture taken:  [] Yes [x] No   Sharp debridement performed:   [] Yes [x] No   Labs ordered this visit:   [] Yes [x] No   Imaging ordered this visit:   [] Yes [x] No           Orders Placed This Encounter   Procedures    Change dressing     Sacral Wound   Cleanse wound with: Vashe, rinse with Normal Saline   Lidocaine:PRN   Silver nitrate:PRN   Periwound care:Cavilon; Gentian violet to wound edges PRN maceration   Primary dressing:Saline moistened hydrofera classic gently pack to wound bed and undermining. (HYDORFERA CLASSIC ONLY)  Secondary dressing: Aquacel extra folded, [place] border dressing.   Offloading: Roho Cushion while sitting in wheel chair, Low airloss mattress while lying in bed. Change position  2 hours while lying in bed.    Bilateral Lower Leg   Primary dressing:MediHoney to any open areas   Secondary dressing: Island border dressing   Edema Control: Tubi  E toes to knee BLE SINGLE LAYER. Elevate BLE as much as possible while lying in bed. Change position every 2 hours.      Frequency: Mon, Wed, fri and PRN.     Follow-up: 4 weeks 3/28/22 with      Home Health:[facility]. Continue Home Health and  provide wound care and dressing changes as above. Please assist patient with proper inflation of Roho Cushion.        Follow up in about 4 weeks (around 3/28/2022).

## 2022-03-16 ENCOUNTER — EXTERNAL HOME HEALTH (OUTPATIENT)
Dept: HOME HEALTH SERVICES | Facility: HOSPITAL | Age: 87
End: 2022-03-16
Payer: MEDICARE

## 2022-03-21 RX ORDER — LISINOPRIL 2.5 MG/1
TABLET ORAL
Qty: 30 TABLET | Refills: 11 | Status: SHIPPED | OUTPATIENT
Start: 2022-03-21 | End: 2023-03-07

## 2022-03-21 RX ORDER — FOLIC ACID 1 MG/1
TABLET ORAL
Qty: 30 TABLET | Refills: 11 | Status: SHIPPED | OUTPATIENT
Start: 2022-03-21 | End: 2023-03-07

## 2022-03-24 ENCOUNTER — TELEPHONE (OUTPATIENT)
Dept: FAMILY MEDICINE | Facility: CLINIC | Age: 87
End: 2022-03-24
Payer: MEDICARE

## 2022-03-24 DIAGNOSIS — H10.33 ACUTE BACTERIAL CONJUNCTIVITIS OF BOTH EYES: Primary | ICD-10-CM

## 2022-03-24 RX ORDER — ERYTHROMYCIN 5 MG/G
OINTMENT OPHTHALMIC EVERY 6 HOURS
Qty: 3.5 G | Refills: 0 | Status: SHIPPED | OUTPATIENT
Start: 2022-03-24 | End: 2022-03-31

## 2022-03-24 NOTE — TELEPHONE ENCOUNTER
Notified by assisted living the patient has signs and symptoms of conjunctivitis.  They report increase eye discharge with yellow crusting.  They report irritation.  Sent orders for warm compress 3 to 6 times a day.  Erythematous listen ophthalmic ointment 0.5% 4 times a day for 7 days.  And recommended if not improved to make evaluation be seen in the office.    Orders Placed This Encounter    erythromycin (ROMYCIN) ophthalmic ointment     Dr. Betty Oakes D.O.   [facility]

## 2022-03-28 ENCOUNTER — HOSPITAL ENCOUNTER (OUTPATIENT)
Dept: WOUND CARE | Facility: HOSPITAL | Age: 87
Discharge: HOME | End: 2022-03-28
Attending: PREVENTIVE MEDICINE
Payer: MEDICARE

## 2022-03-28 VITALS
WEIGHT: 98 LBS | BODY MASS INDEX: 19.76 KG/M2 | TEMPERATURE: 98 F | SYSTOLIC BLOOD PRESSURE: 102 MMHG | HEART RATE: 66 BPM | DIASTOLIC BLOOD PRESSURE: 56 MMHG | HEIGHT: 59 IN

## 2022-03-28 DIAGNOSIS — L89.154 PRESSURE INJURY OF SACRAL REGION, STAGE 4: Primary | ICD-10-CM

## 2022-03-28 DIAGNOSIS — M86.68 CHRONIC OSTEOMYELITIS OF SACRUM: ICD-10-CM

## 2022-03-28 DIAGNOSIS — I10 ESSENTIAL HYPERTENSION: ICD-10-CM

## 2022-03-28 DIAGNOSIS — R53.81 DEBILITY: ICD-10-CM

## 2022-03-28 PROCEDURE — 99214 OFFICE O/P EST MOD 30 MIN: CPT

## 2022-03-28 NOTE — PROGRESS NOTES
Subjective:       Patient ID: Gloria Zuleta is a 89 y.o. female.    Chief Complaint: Pressure Ulcer (Sacral Wound)    Patient presents for follow up for her sacral wound. Her daughter states things are going well and the home health nurses are great. No acute complaints today.    Review of Systems   All other systems reviewed and are negative.        Objective:      Temp:  [97.8 °F (36.6 °C)]   Pulse:  [66]   BP: (102)/(56)   Physical Exam       Altered Skin Integrity 04/18/21 1550 Coccyx Full thickness tissue loss with exposed bone, tendon, or muscle. Often includes undermining and tunneling. May extend into muscle and/or supporting structures. (Active)   04/18/21 1550   Altered Skin Integrity Present on Admission: yes   Side:    Orientation:    Location: Coccyx   Wound Number:    Is this injury device related?:    Primary Wound Type:    Description of Altered Skin Integrity: Full thickness tissue loss with exposed bone, tendon, or muscle. Often includes undermining and tunneling. May extend into muscle and/or supporting structures.   Removal Indication and Assessment:    Wound Outcome: Palliative   (Retired) Wound Length (cm):    (Retired) Wound Width (cm):    (Retired) Depth (cm):    Wound Description (Comments):    Removal Indications:    Wound Image    03/28/22 1000   Dressing Appearance Intact;Moist drainage 03/28/22 1000   Drainage Amount Scant 03/28/22 1000   Appearance Red;Intact;Moist;Granulating 03/28/22 1000   Tissue loss description Full thickness 03/28/22 1000   Red (%), Wound Tissue Color 100 % 03/28/22 1000   Periwound Area Intact;Dry 03/28/22 1000   Wound Edges Defined 03/28/22 1000   Wound Length (cm) 2.7 cm 03/28/22 1000   Wound Width (cm) 1.5 cm 03/28/22 1000   Wound Depth (cm) 1.4 cm 03/28/22 1000   Wound Volume (cm^3) 5.67 cm^3 03/28/22 1000   Wound Surface Area (cm^2) 4.05 cm^2 03/28/22 1000   Undermining (depth (cm)/location) 4.5 03/28/22 1000   Care Cleansed with:;Antimicrobial  agent;Sterile normal saline 03/28/22 1000   Dressing Applied;Foam;Island/border;Tubular bandage 03/28/22 1000   Periwound Care Moisturizer applied 03/28/22 1000   Dressing Change Due 04/06/22 03/28/22 1000         Assessment:         ICD-10-CM ICD-9-CM   1. Pressure injury of sacral region, stage 4  L89.154 707.03     707.24   2. Chronic osteomyelitis of sacrum  M86.68 730.18   3. Debility  R53.81 799.3   4. Essential hypertension  I10 401.9     Sacral wound is improving. Continue current therapy.      Plan:   Tissue pathology and/or culture taken:  [] Yes [x] No   Sharp debridement performed:   [] Yes [x] No   Labs ordered this visit:   [] Yes [x] No   Imaging ordered this visit:   [] Yes [x] No           Orders Placed This Encounter   Procedures    Change dressing     Sacral Wound   Cleanse wound with: Vashe, rinse with Normal Saline   Lidocaine:PRN   Silver nitrate:PRN   Periwound care:Cavilon; Gentian violet to wound edges PRN maceration   Primary dressing:Saline moistened hydrofera classic gently pack to wound bed and undermining. (HYDORFERA CLASSIC ONLY)   Secondary dressing:  [place] border dressing.   Offloading: Roho Cushion while sitting in wheel chair, Low airloss mattress while lying in bed. Change position  2 hours while lying in bed.     Bilateral Lower Leg   Primary dressing:MediHoney to any open areas   Secondary dressing: Foam  Edema Control: Tubi  E toes to knee BLE SINGLE LAYER. Elevate BLE as much as possible while lying in bed. Change position every 2 hours.   Bilateral Feet. Use Moisturizer to legs and feet every day. Jeffry Foams to Anterior portion of feet to be used under Tubi  E       Frequency: Mon, Wed, fri and PRN.     Follow-up: 4 weeks (4/25/2022)        Follow up in about 4 weeks (around 4/25/2022) for Dr. Reyes.

## 2022-04-21 DIAGNOSIS — K21.9 GASTROESOPHAGEAL REFLUX DISEASE WITHOUT ESOPHAGITIS: ICD-10-CM

## 2022-04-21 RX ORDER — PANTOPRAZOLE SODIUM 20 MG/1
TABLET, DELAYED RELEASE ORAL
Qty: 90 TABLET | Refills: 0 | Status: SHIPPED | OUTPATIENT
Start: 2022-04-21 | End: 2022-07-18 | Stop reason: SDUPTHER

## 2022-04-21 NOTE — TELEPHONE ENCOUNTER
Refill Routing Note   Medication(s) are not appropriate for processing by [facility] for the following reason(s):      - Patient has been seen in the ED/Hospital since the last PCP visit    [facility] action(s):  Defer Medication-related problems identified:   Requires labs  Requires appointment        Medication reconciliation completed: No     Appointments  past 12m or future 3m with PCP    Date Provider   Last Visit   5/13/2021 Betty Oakes DO   Next Visit   Visit date not found Betty Oakes DO   ED visits in past 90 days: 0        Note composed:1:02 PM 04/21/2022

## 2022-04-21 NOTE — TELEPHONE ENCOUNTER
Care Due:                  Date            Visit Type   Department     Provider  --------------------------------------------------------------------------------                                EP -                              PRIMARY      [facility]  Last Visit: 05-      [facility]  Next Visit: None Scheduled  None         None Found                                                            Last  Test          Frequency    Reason                     Performed    Due Date  --------------------------------------------------------------------------------    Office Visit  12 months..  atorvastatin, lisinopriL,   05- 05-                             nitroGLYCERIN, tamsulosin    Lipid Panel.  12 months..  atorvastatin.............  04- 04-    Powered by [employer]. Reference number: [id number].   4/21/2022 11:40:40 AM CDT

## 2022-04-25 ENCOUNTER — HOSPITAL ENCOUNTER (OUTPATIENT)
Dept: WOUND CARE | Facility: HOSPITAL | Age: 87
Discharge: HOME | End: 2022-04-25
Attending: PREVENTIVE MEDICINE
Payer: MEDICARE

## 2022-04-25 VITALS
BODY MASS INDEX: 19.76 KG/M2 | HEIGHT: 59 IN | HEART RATE: 61 BPM | SYSTOLIC BLOOD PRESSURE: 108 MMHG | DIASTOLIC BLOOD PRESSURE: 55 MMHG | TEMPERATURE: 98 F | WEIGHT: 98 LBS

## 2022-04-25 DIAGNOSIS — I10 ESSENTIAL HYPERTENSION: ICD-10-CM

## 2022-04-25 DIAGNOSIS — L89.154 PRESSURE INJURY OF SACRAL REGION, STAGE 4: Primary | ICD-10-CM

## 2022-04-25 DIAGNOSIS — M86.68 CHRONIC OSTEOMYELITIS OF SACRUM: ICD-10-CM

## 2022-04-25 DIAGNOSIS — R53.81 DEBILITY: ICD-10-CM

## 2022-04-25 PROCEDURE — 99213 OFFICE O/P EST LOW 20 MIN: CPT

## 2022-04-25 NOTE — PROGRESS NOTES
Subjective:       Patient ID: Gloria Zuleta is a 89 y.o. female.    Chief Complaint: Pressure Ulcer    4/25/22:  F/U with Dr. Reyes for sacral wound.  Wound is improving slowly.  Skin tear noted to right elbow.  No S&S of infection.  Care tolerated well.  Updated orders sent to .        Review of Systems   All other systems reviewed and are negative.        Objective:      Temp:  [98.1 °F (36.7 °C)]   Pulse:  [61]   BP: (108)/(55)   Physical Exam       Altered Skin Integrity 04/18/21 1550 Coccyx Full thickness tissue loss with exposed bone, tendon, or muscle. Often includes undermining and tunneling. May extend into muscle and/or supporting structures. (Active)   04/18/21 1550   Altered Skin Integrity Present on Admission: yes   Side:    Orientation:    Location: Coccyx   Wound Number:    Is this injury device related?:    Primary Wound Type:    Description of Altered Skin Integrity: Full thickness tissue loss with exposed bone, tendon, or muscle. Often includes undermining and tunneling. May extend into muscle and/or supporting structures.   Removal Indication and Assessment:    Wound Outcome: Palliative   (Retired) Wound Length (cm):    (Retired) Wound Width (cm):    (Retired) Depth (cm):    Wound Description (Comments):    Removal Indications:    Wound Image   04/25/22 1000   Description of Altered Skin Integrity Full thickness tissue loss with exposed bone, tendon, or muscle. Often includes undermining and tunneling. May extend into muscle and/or supporting structures. 04/25/22 1000   Dressing Appearance Intact;Moist drainage 04/25/22 1000   Drainage Amount Moderate 04/25/22 1000   Drainage Characteristics/Odor Serosanguineous 04/25/22 1000   Appearance Pink;Red;Moist 04/25/22 1000   Tissue loss description Full thickness 04/25/22 1000   Red (%), Wound Tissue Color 100 % 04/25/22 1000   Periwound Area Macerated;[place] 04/25/22 1000   Wound Edges Open 04/25/22 1000   Wound Length (cm) 2.4 cm 04/25/22 1000    Wound Width (cm) 1.5 cm 04/25/22 1000   Wound Depth (cm) 1.4 cm 04/25/22 1000   Wound Volume (cm^3) 5.04 cm^3 04/25/22 1000   Wound Surface Area (cm^2) 3.6 cm^2 04/25/22 1000   Undermining (depth (cm)/location) 2.6cm from 12-12 oclock 04/25/22 1000   Care Cleansed with:;Sterile normal saline 04/25/22 1000   Dressing Changed;Applied;Calcium alginate;Island/border 04/25/22 1000   Periwound Care Absorptive dressing applied;Skin barrier film applied 04/25/22 1000   Off Loading Other (see comments) 04/25/22 1000   Dressing Change Due 04/27/22 04/25/22 1000             Assessment:         ICD-10-CM ICD-9-CM   1. Pressure injury of sacral region, stage 4  L89.154 707.03     707.24   2. Chronic osteomyelitis of sacrum  M86.68 730.18   3. Debility  R53.81 799.3   4. Essential hypertension  I10 401.9         Plan:   Tissue pathology and/or culture taken:  [] Yes [x] No   Sharp debridement performed:   [] Yes [x] No   Labs ordered this visit:   [] Yes [x] No   Imaging ordered this visit:   [] Yes [x] No           Orders Placed This Encounter   Procedures    Change dressing     Sacral Wound   Cleanse wound with: Vashe, rinse with Normal Saline   Lidocaine:PRN   Silver nitrate:PRN   Periwound care:Cavilon; Gentian violet to wound edges PRN maceration   Primary dressing:Saline moistened hydrofera classic gently pack to wound bed and undermining. (HYDORFERA CLASSIC ONLY)   Secondary dressing:  [place] border dressing.   Offloading: Roho Cushion while sitting in wheel chair, Low airloss mattress while lying in bed. Change position  2 hours while lying in bed.     Bilateral Lower Leg   Primary dressing:MediHoney to any open areas   Secondary dressing: Island border dressing   Edema Control: Tubi  E toes to knee BLE SINGLE LAYER. Elevate BLE as much as possible while lying in bed. Change position every 2 hours.   Bilateral Feet. Use Moisturizer to legs and feet every day. Jeffry Foams to Anterior portion of feet to be  used under Tubi  E     Right elbow skin tear: Cleanse with NS, apply Aquacel ag border dressing    Frequency: Mon, Wed, fri and PRN.     Follow-up: 4 weeks (5/23/2022)        Follow up in about 4 weeks (around 5/23/2022).

## 2022-05-10 PROCEDURE — G0179 MD RECERTIFICATION HHA PT: HCPCS | Mod: ,,, | Performed by: FAMILY MEDICINE

## 2022-05-10 PROCEDURE — G0179 PR HOME HEALTH MD RECERTIFICATION: ICD-10-PCS | Mod: ,,, | Performed by: FAMILY MEDICINE

## 2022-05-17 ENCOUNTER — EXTERNAL HOME HEALTH (OUTPATIENT)
Dept: HOME HEALTH SERVICES | Facility: HOSPITAL | Age: 87
End: 2022-05-17
Payer: MEDICARE

## 2022-05-30 ENCOUNTER — HOSPITAL ENCOUNTER (OUTPATIENT)
Dept: WOUND CARE | Facility: HOSPITAL | Age: 87
Discharge: HOME | End: 2022-05-30
Attending: PREVENTIVE MEDICINE
Payer: MEDICARE

## 2022-05-30 VITALS
HEART RATE: 69 BPM | BODY MASS INDEX: 19.76 KG/M2 | HEIGHT: 59 IN | SYSTOLIC BLOOD PRESSURE: 110 MMHG | WEIGHT: 98 LBS | TEMPERATURE: 98 F | DIASTOLIC BLOOD PRESSURE: 53 MMHG

## 2022-05-30 DIAGNOSIS — R53.81 DEBILITY: ICD-10-CM

## 2022-05-30 DIAGNOSIS — I10 ESSENTIAL HYPERTENSION: ICD-10-CM

## 2022-05-30 DIAGNOSIS — M86.68 CHRONIC OSTEOMYELITIS OF SACRUM: ICD-10-CM

## 2022-05-30 DIAGNOSIS — L89.154 PRESSURE INJURY OF SACRAL REGION, STAGE 4: Primary | ICD-10-CM

## 2022-05-30 PROCEDURE — 99213 OFFICE O/P EST LOW 20 MIN: CPT

## 2022-05-30 NOTE — PROGRESS NOTES
Subjective:       Patient ID: Gloria Zuleta is a 89 y.o. female.    Chief Complaint: Wound Care    Follow up visit. Wound stable. Mild green drainage noted with slight odor. Dr Reyes assessed patient. Starting vashe cleanser to moisten hydrafera blue today. No complaints voiced. New orders sent to home health and family member voiced undrstanding of changes. F/U in 2 months.    Review of Systems   All other systems reviewed and are negative.        Objective:        Physical Exam       Altered Skin Integrity 04/18/21 1550 Coccyx Full thickness tissue loss with exposed bone, tendon, or muscle. Often includes undermining and tunneling. May extend into muscle and/or supporting structures. (Active)   04/18/21 1550   Altered Skin Integrity Present on Admission: yes   Side:    Orientation:    Location: Coccyx   Wound Number:    Is this injury device related?:    Primary Wound Type:    Description of Altered Skin Integrity: Full thickness tissue loss with exposed bone, tendon, or muscle. Often includes undermining and tunneling. May extend into muscle and/or supporting structures.   Removal Indication and Assessment:    Wound Outcome: Palliative   (Retired) Wound Length (cm):    (Retired) Wound Width (cm):    (Retired) Depth (cm):    Wound Description (Comments):    Removal Indications:    Wound Image   05/30/22 1100   Dressing Appearance Intact;Moist drainage 05/30/22 1100   Drainage Amount Moderate 05/30/22 1100   Drainage Characteristics/Odor Green;Malodorous 05/30/22 1100   Appearance Red 05/30/22 1100   Tissue loss description Full thickness 05/30/22 1100   Red (%), Wound Tissue Color 100 % 05/30/22 1100   Periwound Area Intact;Dry 05/30/22 1100   Wound Edges Rolled/closed 05/30/22 1100   Wound Length (cm) 2.5 cm 05/30/22 1100   Wound Width (cm) 1.5 cm 05/30/22 1100   Wound Depth (cm) 1.4 cm 05/30/22 1100   Wound Volume (cm^3) 5.25 cm^3 05/30/22 1100   Wound Surface Area (cm^2) 3.75 cm^2 05/30/22 1100    Undermining (depth (cm)/location) 3.4cm @ 12 05/30/22 1100   Care Cleansed with:;Antimicrobial agent 05/30/22 1100   Dressing Applied;Absorptive Pad;Island/border 05/30/22 1100   Dressing Change Due 06/01/22 05/30/22 1100         Assessment:         ICD-10-CM ICD-9-CM   1. Pressure injury of sacral region, stage 4  L89.154 707.03     707.24   2. Chronic osteomyelitis of sacrum  M86.68 730.18   3. Debility  R53.81 799.3   4. Essential hypertension  I10 401.9         Plan:   Tissue pathology and/or culture taken:  [] Yes [x] No   Sharp debridement performed:   [] Yes [x] No   Labs ordered this visit:   [] Yes [x] No   Imaging ordered this visit:   [] Yes [x] No           Orders Placed This Encounter   Procedures    Change dressing     Sacral Wound   Cleanse wound with: Vashe, rinse with Normal Saline   Lidocaine:PRN   Silver nitrate:PRN   Periwound care:Cavilon; Gentian violet to wound edges PRN maceration   Primary dressing:Vashe moistened hydrofera classic gently pack to wound bed and undermining. (HYDORFERA CLASSIC ONLY)   Secondary dressing:  [place] border dressing.   Offloading: Roho Cushion while sitting in wheel chair, Low airloss mattress while lying in bed. Change position  2 hours while lying in bed.     Bilateral Lower Leg   Primary dressing:MediHoney to any open areas   Secondary dressing: Island border dressing   Edema Control: Elevate BLE as much as possible while lying in bed. Change position every 2 hours.   Bilateral Feet. Use Moisturizer to legs and feet every day.     Right elbow skin tear: Cleanse with NS, apply Aquacel ag border dressing     Frequency: Mon, Wed, fri and PRN.     Home Health to change dressing as ordered above.    Follow-up: 2 months        Follow up in about 2 months (around 7/30/2022).

## 2022-06-09 ENCOUNTER — TELEPHONE (OUTPATIENT)
Dept: FAMILY MEDICINE | Facility: CLINIC | Age: 87
End: 2022-06-09
Payer: MEDICARE

## 2022-06-09 RX ORDER — OLOPATADINE HYDROCHLORIDE 1 MG/ML
1 SOLUTION/ DROPS OPHTHALMIC 2 TIMES DAILY
Qty: 5 ML | Refills: 11 | Status: SHIPPED | OUTPATIENT
Start: 2022-06-09 | End: 2023-06-05 | Stop reason: SDUPTHER

## 2022-06-09 NOTE — TELEPHONE ENCOUNTER
----- Message from Jeannie Stokes sent at 6/9/2022  9:33 AM CDT -----  Type:  Needs Medical Advice    Who Called:  pt daughter   Symptoms (please be specific):  would like to get a call back     Would the patient rather a call back or a response via [facility]?  Call   Best Call Back Number:  [phone]  Additional Information:

## 2022-06-09 NOTE — TELEPHONE ENCOUNTER
Unfortunately there is no generic for Restasis.  Restasis should also be prescribed by an ophthalmologist and not by primary care.  I have been trying to get her to see an ophthalmologist for these refills.  I sent prescription ol for the generic Pataday if available    Orders Placed This Encounter    olopatadine (PATANOL) 0.1 % ophthalmic solution     Dr. Betty Oakes D.O.   Family Medicine

## 2022-06-09 NOTE — TELEPHONE ENCOUNTER
Left message that the Patanol has been called into [facility] and that Dr. Oakes recommends an opthalmologist.

## 2022-06-09 NOTE — TELEPHONE ENCOUNTER
Pt's daughter asks about tier exemption or consider generic for Pataday and Restasis. Patient lives at [facility], which uses [facility] drugs. Please advise.

## 2022-06-27 ENCOUNTER — DOCUMENT SCAN (OUTPATIENT)
Dept: HOME HEALTH SERVICES | Facility: HOSPITAL | Age: 87
End: 2022-06-27
Payer: MEDICARE

## 2022-06-27 RX ORDER — BACILLUS COAGULANS/INULIN 1B-250 MG
1 CAPSULE ORAL DAILY
Qty: 30 CAPSULE | Refills: 11 | Status: SHIPPED | OUTPATIENT
Start: 2022-06-27 | End: 2022-10-19

## 2022-06-27 NOTE — TELEPHONE ENCOUNTER
----- Message from Lay Fritz sent at 6/27/2022 11:54 AM CDT -----  Type:  RX Refill Request    Who Called: [facility] pharmacy  Refill or New Rx:refill  RX Name and Strength:PROBIOTIC FORMULA, INULIN, 1 billion-250 cell-mg Cap  How is the patient currently taking it? (ex. 1XDay):1 CAPSULE BY MOUTH ONCE DAILY.  Is this a 30 day or 90 day RX:30  Preferred Pharmacy with phone number:[facility] [address] [facility]   Phone: [phone]  Fax:  [phone]        Local or Mail Order:local  Ordering Provider:Dr Oakes  Would the patient rather a call back or a response via [facility]? call  Best Call Back Number:[phone]  Additional Information:

## 2022-07-09 PROCEDURE — G0179 PR HOME HEALTH MD RECERTIFICATION: ICD-10-PCS | Mod: ,,, | Performed by: FAMILY MEDICINE

## 2022-07-09 PROCEDURE — G0179 MD RECERTIFICATION HHA PT: HCPCS | Mod: ,,, | Performed by: FAMILY MEDICINE

## 2022-07-18 ENCOUNTER — TELEPHONE (OUTPATIENT)
Dept: FAMILY MEDICINE | Facility: CLINIC | Age: 87
End: 2022-07-18
Payer: MEDICARE

## 2022-07-18 DIAGNOSIS — I63.9 CEREBROVASCULAR ACCIDENT (CVA), UNSPECIFIED MECHANISM: ICD-10-CM

## 2022-07-18 DIAGNOSIS — K21.9 GASTROESOPHAGEAL REFLUX DISEASE WITHOUT ESOPHAGITIS: ICD-10-CM

## 2022-07-18 NOTE — TELEPHONE ENCOUNTER
Care Due:                  Date            Visit Type   Department     Provider  --------------------------------------------------------------------------------                                EP -                              PRIMARY      [facility]  Last Visit: 05-      [facility] ([facility]                               -                              PRIMARY [facility]  Next Visit: 10-      [facility] ([facility])   [facility]                                                            Last  Test          Frequency    Reason                     Performed    Due Date  --------------------------------------------------------------------------------    Office Visit  12 months..  atorvastatin, lisinopriL,   05- 05-                             nitroGLYCERIN, tamsulosin    CMP.........  12 months..  atorvastatin, lisinopriL.  10-   10-    Lipid Panel.  12 months..  atorvastatin.............  04- 04-    [facility] Embedded Care Gaps. Reference number: [id number]. 7/18/2022   3:00:24 PM CDT

## 2022-07-18 NOTE — TELEPHONE ENCOUNTER
Spoke to Esme with [facility] pharmacy she just wanted to confirm the new prescription Dr. Oakes sent last month.

## 2022-07-18 NOTE — TELEPHONE ENCOUNTER
----- Message from Darci Lopez sent at 7/18/2022 12:30 PM CDT -----  Type:  Pharmacy Calling to Clarify an RX    Name of Caller:Esme  Pharmacy Name:[facility]  Prescription Name:olopatadine (PATANOL) 0.1 % ophthalmic solution 5 mL 11 6/9/2022 6/9/2023 --  Sig - Route: Place 1 drop into both eyes 2 (two) times daily. - Both Eyes      What do they need to clarify?: She needs to verify this medication is replacement.  Best Call Back Number: [phone]  Additional Information:

## 2022-07-19 ENCOUNTER — EXTERNAL HOME HEALTH (OUTPATIENT)
Dept: HOME HEALTH SERVICES | Facility: HOSPITAL | Age: 87
End: 2022-07-19
Payer: MEDICARE

## 2022-07-19 RX ORDER — PANTOPRAZOLE SODIUM 20 MG/1
20 TABLET, DELAYED RELEASE ORAL DAILY
Qty: 90 TABLET | Refills: 0 | Status: SHIPPED | OUTPATIENT
Start: 2022-07-19 | End: 2022-10-19

## 2022-07-19 RX ORDER — ASPIRIN 81 MG/1
81 TABLET ORAL DAILY
Qty: 90 TABLET | Refills: 0 | Status: SHIPPED | OUTPATIENT
Start: 2022-07-19 | End: 2022-10-19

## 2022-07-19 NOTE — TELEPHONE ENCOUNTER
----- Message from Cora Trinh sent at 7/19/2022  2:01 PM CDT -----  Contact: [phone]  Type:  Pharmacy Calling to Clarify an RX    Name of Caller: Esme  Pharmacy Name: [facility]  Prescription Name:aspirin (ECOTRIN) 81 MG EC tablet and olopatadine (PATANOL) 0.1 % ophthalmic solution  What do they need to clarify?: Refill request  Best Call Back Number: [phone]

## 2022-08-15 ENCOUNTER — HOSPITAL ENCOUNTER (OUTPATIENT)
Dept: WOUND CARE | Facility: HOSPITAL | Age: 87
Discharge: HOME | End: 2022-08-15
Attending: PREVENTIVE MEDICINE
Payer: MEDICARE

## 2022-08-15 VITALS
DIASTOLIC BLOOD PRESSURE: 57 MMHG | HEIGHT: 59 IN | HEART RATE: 71 BPM | TEMPERATURE: 98 F | SYSTOLIC BLOOD PRESSURE: 106 MMHG | WEIGHT: 98 LBS | BODY MASS INDEX: 19.76 KG/M2

## 2022-08-15 DIAGNOSIS — M86.68 CHRONIC OSTEOMYELITIS OF SACRUM: ICD-10-CM

## 2022-08-15 DIAGNOSIS — R53.81 DEBILITY: ICD-10-CM

## 2022-08-15 DIAGNOSIS — L89.154 PRESSURE INJURY OF SACRAL REGION, STAGE 4: Primary | ICD-10-CM

## 2022-08-15 DIAGNOSIS — I10 ESSENTIAL HYPERTENSION: ICD-10-CM

## 2022-08-15 PROCEDURE — 99213 OFFICE O/P EST LOW 20 MIN: CPT

## 2022-08-15 NOTE — PROGRESS NOTES
[facility]    Subjective:       Patient ID: Gloria Zuleta is a 89 y.o. female.    Chief Complaint: No chief complaint on file.    Follow up appointment for sacral wound with underlying chronic osteomyelitis. Wound remains stable with no apparent signs of infection noted.     Review of Systems   All other systems reviewed and are negative.        Objective:        Physical Exam       Altered Skin Integrity 04/18/21 1550 Coccyx Full thickness tissue loss with exposed bone, tendon, or muscle. Often includes undermining and tunneling. May extend into muscle and/or supporting structures. (Active)   04/18/21 1550   Altered Skin Integrity Present on Admission: yes   Side:    Orientation:    Location: Coccyx   Wound Number:    Is this injury device related?:    Primary Wound Type:    Description of Altered Skin Integrity: Full thickness tissue loss with exposed bone, tendon, or muscle. Often includes undermining and tunneling. May extend into muscle and/or supporting structures.   Removal Indication and Assessment:    Wound Outcome: Palliative   (Retired) Wound Length (cm):    (Retired) Wound Width (cm):    (Retired) Depth (cm):    Wound Description (Comments):    Removal Indications:    Wound Image   08/15/22 1000   Dressing Appearance Intact;Moist drainage 08/15/22 1000   Drainage Amount Moderate 08/15/22 1000   Drainage Characteristics/Odor Serosanguineous 08/15/22 1000   Appearance Red 08/15/22 1000   Tissue loss description Full thickness 08/15/22 1000   Red (%), Wound Tissue Color 100 % 08/15/22 1000   Periwound Area Braden 08/15/22 1000   Wound Edges Rolled/closed 08/15/22 1000   Wound Length (cm) 2 cm 08/15/22 1000   Wound Width (cm) 1.7 cm 08/15/22 1000   Wound Depth (cm) 1.5 cm 08/15/22 1000   Wound Volume (cm^3) 5.1 cm^3 08/15/22 1000   Wound Surface Area (cm^2) 3.4 cm^2 08/15/22 1000   Undermining (depth (cm)/location) 3.2@ 12-12 08/15/22 1000   Care Cleansed  with:;Wound cleanser 08/15/22 1000   Dressing Applied;Island/border 08/15/22 1000   Off Loading Other (see comments) 08/15/22 1000   Dressing Change Due 08/17/22 08/15/22 1000         Assessment:         ICD-10-CM ICD-9-CM   1. Pressure injury of sacral region, stage 4  L89.154 707.03     707.24   2. Chronic osteomyelitis of sacrum  M86.68 730.18   3. Debility  R53.81 799.3   4. Essential hypertension  I10 401.9       Wound is stable. No gross signs of infection.      Plan:   Tissue pathology and/or culture taken:  [] Yes [x] No   Sharp debridement performed:   [] Yes [x] No   Labs ordered this visit:   [] Yes [x] No   Imaging ordered this visit:   [] Yes [x] No             Orders Placed This Encounter   Procedures    Change dressing     Sacral Wound   Cleanse wound with: Vashe, rinse with Normal Saline   Lidocaine:PRN   Silver nitrate:PRN   Periwound care:Cavilon; Gentian violet to wound edges PRN maceration   Primary dressing:Vashe moistened hydrofera classic gently pack to wound bed and undermining. (HYDORFERA CLASSIC ONLY)   Secondary dressing:  [place] border dressing.   Offloading: Roho Cushion while sitting in wheel chair, Low airloss mattress while lying in bed. Change position  2 hours while lying in bed.     Bilateral Lower Leg   Edema Control: Elevate BLE as much as possible while lying in bed. Change position every 2 hours.   Bilateral Feet. Use Moisturizer to legs and feet every day.         Frequency: Mon, Wed, fri and PRN.     Home Health to change dressing as ordered above.     Follow-up: 2 months        Follow up in about 2 months (around 10/15/2022).

## 2022-09-07 PROCEDURE — G0179 PR HOME HEALTH MD RECERTIFICATION: ICD-10-PCS | Mod: ,,, | Performed by: FAMILY MEDICINE

## 2022-09-07 PROCEDURE — G0179 MD RECERTIFICATION HHA PT: HCPCS | Mod: ,,, | Performed by: FAMILY MEDICINE

## 2022-09-15 ENCOUNTER — EXTERNAL HOME HEALTH (OUTPATIENT)
Dept: HOME HEALTH SERVICES | Facility: HOSPITAL | Age: 87
End: 2022-09-15
Payer: MEDICARE

## 2022-10-10 ENCOUNTER — HOSPITAL ENCOUNTER (OUTPATIENT)
Dept: WOUND CARE | Facility: HOSPITAL | Age: 87
Discharge: HOME | End: 2022-10-10
Attending: PREVENTIVE MEDICINE
Payer: MEDICARE

## 2022-10-10 VITALS
DIASTOLIC BLOOD PRESSURE: 75 MMHG | HEART RATE: 69 BPM | HEIGHT: 59 IN | WEIGHT: 98 LBS | BODY MASS INDEX: 19.76 KG/M2 | SYSTOLIC BLOOD PRESSURE: 117 MMHG | TEMPERATURE: 98 F

## 2022-10-10 DIAGNOSIS — I10 ESSENTIAL HYPERTENSION: ICD-10-CM

## 2022-10-10 DIAGNOSIS — R53.81 DEBILITY: ICD-10-CM

## 2022-10-10 DIAGNOSIS — L89.154 PRESSURE INJURY OF SACRAL REGION, STAGE 4: Primary | ICD-10-CM

## 2022-10-10 DIAGNOSIS — M86.68 CHRONIC OSTEOMYELITIS OF SACRUM: ICD-10-CM

## 2022-10-10 PROCEDURE — 99213 OFFICE O/P EST LOW 20 MIN: CPT

## 2022-10-10 NOTE — PROGRESS NOTES
[facility]    Subjective:       Patient ID: Gloria Zuleta is a 89 y.o. female.    Chief Complaint: Pressure Ulcer    Follow up for sacral wound. Wound slightly smaller. No new c/o. New plan of care initiated and discussed with daughter in law. Plan of care verbalized. Home health updated with new orders.     Review of Systems   All systems were reviewed and are negative, except that mentioned in the HPI.    Objective:      Physical Exam  Constitutional:       Appearance: She is well-developed.   HENT:      Head: Normocephalic and atraumatic.   Eyes:      Pupils: Pupils are equal, round, and reactive to light.   Cardiovascular:      Rate and Rhythm: Normal rate.   Pulmonary:      Effort: Pulmonary effort is normal. No respiratory distress.      Breath sounds: No stridor.   Abdominal:      General: There is no distension.   Musculoskeletal:      Cervical back: Normal range of motion.   Skin:     Comments: See Synopsis for wound details   Neurological:      Mental Status: She is alert and oriented to person, place, and time.          Altered Skin Integrity 04/18/21 1550 Coccyx Full thickness tissue loss with exposed bone, tendon, or muscle. Often includes undermining and tunneling. May extend into muscle and/or supporting structures. (Active)   04/18/21 1550   Altered Skin Integrity Present on Admission: yes   Side:    Orientation:    Location: Coccyx   Wound Number:    Is this injury device related?:    Primary Wound Type:    Description of Altered Skin Integrity: Full thickness tissue loss with exposed bone, tendon, or muscle. Often includes undermining and tunneling. May extend into muscle and/or supporting structures.   Ankle-Brachial Index:    Pulses:    Removal Indication and Assessment:    Wound Outcome: Palliative   (Retired) Wound Length (cm):    (Retired) Wound Width (cm):    (Retired) Depth (cm):    Wound Description (Comments):    Removal Indications:     Wound Image   10/10/22 1655   Description of Altered Skin Integrity Full thickness tissue loss with exposed bone, tendon, or muscle. Often includes undermining and tunneling. May extend into muscle and/or supporting structures. 10/10/22 1655   Dressing Appearance Intact;Moist drainage 10/10/22 1655   Drainage Amount Moderate 10/10/22 1655   Drainage Characteristics/Odor Serosanguineous 10/10/22 1655   Appearance Red;Moist 10/10/22 1655   Tissue loss description Full thickness 10/10/22 1655   Red (%), Wound Tissue Color 100 % 10/10/22 1655   Periwound Area Intact;Dry 10/10/22 1655   Wound Edges Rolled/closed 10/10/22 1655   Wound Length (cm) 2 cm 10/10/22 1655   Wound Width (cm) 1.7 cm 10/10/22 1655   Wound Depth (cm) 1.6 cm 10/10/22 1655   Wound Volume (cm^3) 5.44 cm^3 10/10/22 1655   Wound Surface Area (cm^2) 3.4 cm^2 10/10/22 1655   Undermining (depth (cm)/location) 3.6cm 12-12 10/10/22 1655   Care Cleansed with:;Antimicrobial agent;Sterile normal saline 10/10/22 1655   Dressing Applied;Silver;Calcium alginate;Island/border 10/10/22 1655   Packing packed with 10/10/22 1655   Packing Inserted  3 10/10/22 1655   Packing Removed 2 10/10/22 1655   Periwound Care Absorptive dressing applied;Skin barrier film applied 10/10/22 1655   Dressing Change Due 10/12/22 10/10/22 1655         Assessment:         ICD-10-CM ICD-9-CM   1. Pressure injury of sacral region, stage 4  L89.154 707.03     707.24   2. Chronic osteomyelitis of sacrum  M86.68 730.18   3. Debility  R53.81 799.3   4. Essential hypertension  I10 401.9         Plan:   Tissue pathology and/or culture taken:  [] Yes [x] No   Sharp debridement performed:   [] Yes [x] No   Labs ordered this visit:   [] Yes [x] No   Imaging ordered this visit:   [] Yes [x] No           Orders Placed This Encounter   Procedures    Change dressing     Sacral Wound   Cleanse wound with: Vashe, rinse with Normal Saline   Lidocaine:PRN   Silver nitrate:PRN   Periwound care:Cavilon;  Gentian violet to wound edges PRN maceration   Primary dressing:Aquacel Ag Rope gently pack to wound bed and undermining.   Secondary dressing:  [place] border dressing.   Offloading: Roho Cushion while sitting in wheel chair, Low airloss mattress while lying in bed. Change position  2 hours while lying in bed.     Bilateral Lower Leg   Edema Control: Elevate BLE as much as possible while lying in bed. Change position every 2 hours.   Bilateral Feet. Use Moisturizer to legs and feet every day.     Frequency: Mon, Wed, fri and PRN.   [facility] to change dressing as ordered above.     Follow-up: 2 months with         Follow up in about 8 weeks (around 12/5/2022) for .- per pt/family request.  All questions were answered.

## 2022-10-19 ENCOUNTER — OFFICE VISIT (OUTPATIENT)
Dept: FAMILY MEDICINE | Facility: CLINIC | Age: 87
End: 2022-10-19
Payer: MEDICARE

## 2022-10-19 VITALS
OXYGEN SATURATION: 94 % | HEART RATE: 88 BPM | DIASTOLIC BLOOD PRESSURE: 60 MMHG | HEIGHT: 59 IN | BODY MASS INDEX: 19.79 KG/M2 | SYSTOLIC BLOOD PRESSURE: 120 MMHG

## 2022-10-19 DIAGNOSIS — D63.8 ANEMIA, CHRONIC DISEASE: ICD-10-CM

## 2022-10-19 DIAGNOSIS — Z71.89 ADVANCED CARE PLANNING/COUNSELING DISCUSSION: ICD-10-CM

## 2022-10-19 DIAGNOSIS — D69.6 THROMBOCYTOPENIA, UNSPECIFIED: ICD-10-CM

## 2022-10-19 DIAGNOSIS — F03.90 DEMENTIA WITHOUT BEHAVIORAL DISTURBANCE: Primary | ICD-10-CM

## 2022-10-19 DIAGNOSIS — I42.8 NICM (NONISCHEMIC CARDIOMYOPATHY): ICD-10-CM

## 2022-10-19 DIAGNOSIS — M86.68 CHRONIC OSTEOMYELITIS OF SACRUM: ICD-10-CM

## 2022-10-19 DIAGNOSIS — L89.154 PRESSURE ULCER OF SACRAL REGION, STAGE 4: ICD-10-CM

## 2022-10-19 DIAGNOSIS — I10 ESSENTIAL HYPERTENSION: ICD-10-CM

## 2022-10-19 DIAGNOSIS — C50.911 MALIGNANT NEOPLASM OF RIGHT FEMALE BREAST, UNSPECIFIED ESTROGEN RECEPTOR STATUS, UNSPECIFIED SITE OF BREAST: ICD-10-CM

## 2022-10-19 DIAGNOSIS — I25.10 CAD IN NATIVE ARTERY: ICD-10-CM

## 2022-10-19 DIAGNOSIS — K21.9 GASTROESOPHAGEAL REFLUX DISEASE WITHOUT ESOPHAGITIS: ICD-10-CM

## 2022-10-19 DIAGNOSIS — Z86.73 HISTORY OF CVA (CEREBROVASCULAR ACCIDENT) WITHOUT RESIDUAL DEFICITS: ICD-10-CM

## 2022-10-19 DIAGNOSIS — I50.42 CHRONIC COMBINED SYSTOLIC AND DIASTOLIC HEART FAILURE: ICD-10-CM

## 2022-10-19 DIAGNOSIS — E78.2 MIXED HYPERLIPIDEMIA: ICD-10-CM

## 2022-10-19 PROBLEM — N17.9 AKI (ACUTE KIDNEY INJURY): Status: RESOLVED | Noted: 2017-02-22 | Resolved: 2022-10-19

## 2022-10-19 PROBLEM — N20.0 KIDNEY STONE: Status: RESOLVED | Noted: 2020-09-06 | Resolved: 2022-10-19

## 2022-10-19 PROBLEM — N39.0 URINARY TRACT INFECTION WITHOUT HEMATURIA: Status: RESOLVED | Noted: 2021-05-03 | Resolved: 2022-10-19

## 2022-10-19 PROCEDURE — 1157F ADVNC CARE PLAN IN RCRD: CPT | Mod: CPTII,S$GLB,, | Performed by: FAMILY MEDICINE

## 2022-10-19 PROCEDURE — 1157F PR ADVANCE CARE PLAN OR EQUIV PRESENT IN MEDICAL RECORD: ICD-10-PCS | Mod: CPTII,S$GLB,, | Performed by: FAMILY MEDICINE

## 2022-10-19 PROCEDURE — 1159F MED LIST DOCD IN RCRD: CPT | Mod: CPTII,S$GLB,, | Performed by: FAMILY MEDICINE

## 2022-10-19 PROCEDURE — 1159F PR MEDICATION LIST DOCUMENTED IN MEDICAL RECORD: ICD-10-PCS | Mod: CPTII,S$GLB,, | Performed by: FAMILY MEDICINE

## 2022-10-19 PROCEDURE — 1126F PR PAIN SEVERITY QUANTIFIED, NO PAIN PRESENT: ICD-10-PCS | Mod: CPTII,S$GLB,, | Performed by: FAMILY MEDICINE

## 2022-10-19 PROCEDURE — 99499 UNLISTED E&M SERVICE: CPT | Mod: HCNC,S$GLB,, | Performed by: FAMILY MEDICINE

## 2022-10-19 PROCEDURE — 99999 PR PBB SHADOW E&M-EST. PATIENT-LVL III: CPT | Mod: PBBFAC,,, | Performed by: FAMILY MEDICINE

## 2022-10-19 PROCEDURE — 1126F AMNT PAIN NOTED NONE PRSNT: CPT | Mod: CPTII,S$GLB,, | Performed by: FAMILY MEDICINE

## 2022-10-19 PROCEDURE — 99214 PR OFFICE/OUTPT VISIT, EST, LEVL IV, 30-39 MIN: ICD-10-PCS | Mod: S$GLB,,, | Performed by: FAMILY MEDICINE

## 2022-10-19 PROCEDURE — 99214 OFFICE O/P EST MOD 30 MIN: CPT | Mod: S$GLB,,, | Performed by: FAMILY MEDICINE

## 2022-10-19 PROCEDURE — 99999 PR PBB SHADOW E&M-EST. PATIENT-LVL III: ICD-10-PCS | Mod: PBBFAC,,, | Performed by: FAMILY MEDICINE

## 2022-10-19 PROCEDURE — 99499 RISK ADDL DX/OHS AUDIT: ICD-10-PCS | Mod: HCNC,S$GLB,, | Performed by: FAMILY MEDICINE

## 2022-10-19 RX ORDER — POTASSIUM CHLORIDE 20 MEQ/1
20 TABLET, EXTENDED RELEASE ORAL DAILY
Qty: 30 TABLET | Refills: 11 | Status: SHIPPED | OUTPATIENT
Start: 2022-10-19 | End: 2023-09-28

## 2022-10-19 RX ORDER — CARVEDILOL 3.12 MG/1
TABLET ORAL
Qty: 60 TABLET | Refills: 11 | Status: SHIPPED | OUTPATIENT
Start: 2022-10-19 | End: 2023-09-28

## 2022-10-19 RX ORDER — FUROSEMIDE 20 MG/1
20 TABLET ORAL DAILY
Qty: 30 TABLET | Refills: 11 | Status: SHIPPED | OUTPATIENT
Start: 2022-10-19 | End: 2023-09-28

## 2022-10-19 RX ORDER — FERROUS GLUCONATE 324(38)MG
1 TABLET ORAL DAILY
Qty: 30 TABLET | Refills: 11 | Status: SHIPPED | OUTPATIENT
Start: 2022-10-19 | End: 2023-10-06

## 2022-10-19 RX ORDER — POTASSIUM CHLORIDE 20 MEQ/1
20 TABLET, EXTENDED RELEASE ORAL DAILY
Qty: 30 TABLET | Refills: 11 | Status: SHIPPED | OUTPATIENT
Start: 2022-10-19 | End: 2022-10-19

## 2022-10-19 RX ORDER — PANTOPRAZOLE SODIUM 20 MG/1
20 TABLET, DELAYED RELEASE ORAL DAILY PRN
Qty: 30 TABLET | Refills: 5 | Status: SHIPPED | OUTPATIENT
Start: 2022-10-19

## 2022-10-19 RX ORDER — ACETAMINOPHEN 325 MG/1
325 TABLET ORAL EVERY 6 HOURS PRN
Qty: 30 TABLET | Refills: 11 | Status: SHIPPED | OUTPATIENT
Start: 2022-10-19

## 2022-10-19 NOTE — PROGRESS NOTES
FAMILY MEDICINE  [facility]    Reason for visit:   Chief Complaint   Patient presents with    Follow-up    Hypertension    Medication Refill    Congestive Heart Failure         SUBJECTIVE: Gloria Zuleta is a 89 y.o. female  - with hypertension, CAD, nonischemic cardiomyopathy, hyperlipidemia, dementia, history of lymphoma (remote), right breast cancer (2016 and opted to not treat), venous stasis dermatitis, depression, congestive heart failure, GERD, nephrolithiasis, recurrent urinary tract infections, pressure ulcer, chronic osteomyelitis of the sacrum, and history of CVA (4/2021) presents to follow-up her medications, refills, and labs    Cardiologist: Dr. Jaime  Wound care:  Dr. Johnson  Urology Dr. Mckeon  Residency: [facility] living  Home Health: [facility]  Living will: yes DNR  MPOA: son Luz Maria    Today she presents with son Manjinder and daughter -in-law. They report that she is doing well. She still has a sacral ulcer that has been stable for the last 5 years. She follows with wound care. She has breast cancer of her right breast since 2016. She and family opted to not treat. Occasionally family reports that she complains of pain in that area however today she denies any pain.  They report that she takes Tylenol as needed.    They also report occasional her abdomen becomes distended.  She does not complain about it.  Reports that she is eating well and has regular bowel movements.  They deny any blood in her stool.  They report that the bowel movements her small and  throughout the day.  They also reports she has stool incontinence.  There unsure if she has lost any weight however it is difficult her    They  would also like to discuss discontinuing any unnecessary medications    1.Congestive heart failure  - rEFHF  - supportive care    Cardiologist: Dr. Jaime  Nephrologist: none    Symptoms:  No chest pain, No shortness of breath, No dyspnea on exertion, No orthopnea, No  paroxysmal nocturnal dyspnea, No edema, No palpitations, No syncope    Medications:   lisinopriL (PRINIVIL,ZESTRIL) 2.5 MG tablet, TAKE 1 TABLET BY MOUTH ONCE DAILY., Disp: 30 tablet, Rfl: 11  carvediloL (COREG) 3.125 MG tablet, TAKE 1 TABLET BY MOUTH TWICE A DAY WITH A MEAL., Disp: 60 tablet, Rfl: 11  furosemide (LASIX) 20 MG tablet, TAKE 1 TABLET BY MOUTH ONCE DAILY., Disp: 30 tablet, Rfl: 11    ARB/ACEI: yes  BB: yes  Diuretic: yes    Last hospitalization for CHF: 1/2021    Last Echo:  === Results for orders placed during the hospital encounter of 01/18/21 ===    Echo Color Flow Doppler? Yes    - Interpretation Summary -  · The left ventricle is normal in size with severely decreased systolic function. The estimated ejection fraction is 20-25%  · There is severe left ventricular global hypokinesis.  · Grade I left ventricular diastolic dysfunction.  · Normal right ventricular size with normal right ventricular systolic function.  · Mild-to-moderate aortic regurgitation.  · Mild tricuspid regurgitation.  · Mild-to-moderate mitral regurgitation.  · Normal central venous pressure (3 mmHg).  · The estimated PA systolic pressure is 32 mmHg.    Baseline weight: 98lbs  Wt Readings from Last 5 Encounters:  10/10/22 : 44.5 kg (98 lb)  08/15/22 : 44.5 kg (98 lb)  05/30/22 : 44.5 kg (98 lb)  04/25/22 : 44.5 kg (98 lb)  03/28/22 : 44.5 kg (98 lb)                Review of Systems   All other systems reviewed and are negative.    HEALTH MAINTENANCE:   Health Maintenance   Topic Date Due    Aspirin/Antiplatelet Therapy  10/19/2023    Lipid Panel  04/18/2026    TETANUS VACCINE  08/31/2027     Health Maintenance Topics with due status: Not Due       Topic Last Completion Date    TETANUS VACCINE 08/31/2017    Lipid Panel 04/18/2021    Aspirin/Antiplatelet Therapy 10/19/2022     Health Maintenance Due   Topic Date Due    Shingles Vaccine (1 of 2) 10/26/2017    COVID-19 Vaccine (4 - Booster) 03/29/2021       HISTORY:   Past Medical  History:   Diagnosis Date    Breast CA     right    CAD (coronary artery disease)     CHF (congestive heart failure)     Dementia     Depression 10/18/2018    Hyperlipidemia     Hypertension     Labial abscess     Lymphoma     creceived chemo treatment    Osteoarthritis        Past Surgical History:   Procedure Laterality Date    ABSCESS DRAINAGE      labia    COLONOSCOPY      dialtion      urethral dialtion    ESOPHAGOGASTRODUODENOSCOPY      HYSTERECTOMY      total abdominal    ALEXANDRU    NECK EXPLORATION      exploratory lap & Bx of  RIGHT neck    UPPER GASTROINTESTINAL ENDOSCOPY         Family History   Problem Relation Age of Onset    No Known Problems Mother     No Known Problems Father        Social History     Tobacco Use    Smoking status: Never    Smokeless tobacco: Never   Substance Use Topics    Alcohol use: No    Drug use: No       Social History     Social History Narrative    Living at [facility]. Confined to a wheelchair. Daughter in law and son help care for her. 1 son (Manjinder) is mPOA. 4 grandchildren and 6 great-grandchildren.  Sacral decubital ulcer since about 2016       ALLERGIES:   Review of patient's allergies indicates:   Allergen Reactions    Bactrim [sulfamethoxazole-trimethoprim]     Codeine     Cortisone     Keflex [cephalexin]     Pcn [penicillins]     Sulfadiazine     Sumycin 250     Contrast media Other (See Comments)     unknown    Donnatal [phenobarb-hyoscy-atropine-scop] Other (See Comments)     Unknown     Restoril [temazepam] Other (See Comments)     unknown       MEDS:     Current Outpatient Medications:     artificial tears (ISOPTO TEARS) 0.5 % ophthalmic solution, Place 1 drop into both eyes as needed (dry eyes)., Disp: 15 mL, Rfl: 5    aspirin (ECOTRIN) 81 MG EC tablet, TAKE 1 TABLET BY MOUTH ONCE DAILY., Disp: 30 tablet, Rfl: 11    folic acid (FOLVITE) 1 MG tablet, TAKE 1 TABLET BY MOUTH ONCE DAILY., Disp: 30 tablet, Rfl: 11    lisinopriL (PRINIVIL,ZESTRIL) 2.5 MG tablet, TAKE 1  "TABLET BY MOUTH ONCE DAILY., Disp: 30 tablet, Rfl: 11    melatonin (MELATIN) 3 mg tablet, Take 2 tablets (6 mg total) by mouth nightly as needed for Insomnia., Disp: 60 tablet, Rfl: 5    nitroGLYCERIN (NITROSTAT) 0.4 MG SL tablet, Place 1 tablet (0.4 mg total) under the tongue every 5 (five) minutes as needed for Chest pain. Max 3. Call 911, Disp: 15 tablet, Rfl: 0    olopatadine (PATANOL) 0.1 % ophthalmic solution, Place 1 drop into both eyes 2 (two) times daily., Disp: 5 mL, Rfl: 11    polyethylene glycol (GLYCOLAX) 17 gram PwPk, Take 17 g by mouth once daily. Hold if diarrhea develops, Disp: 60 each, Rfl: 5    RESTASIS 0.05 % ophthalmic emulsion, Place 1 drop into both eyes 2 (two) times a day., Disp: 60 each, Rfl: 5    acetaminophen (TYLENOL) 325 MG tablet, Take 1 tablet (325 mg total) by mouth every 6 (six) hours as needed for Pain., Disp: 30 tablet, Rfl: 11    carvediloL (COREG) 3.125 MG tablet, TAKE 1 TABLET BY MOUTH TWICE A DAY WITH A MEAL, Disp: 60 tablet, Rfl: 11    ferrous gluconate (FERGON) 324 MG tablet, Take 1 tablet (324 mg total) by mouth once daily., Disp: 30 tablet, Rfl: 11    furosemide (LASIX) 20 MG tablet, Take 1 tablet (20 mg total) by mouth once daily., Disp: 30 tablet, Rfl: 11    pantoprazole (PROTONIX) 20 MG tablet, Take 1 tablet (20 mg total) by mouth daily as needed (heartburn)., Disp: 30 tablet, Rfl: 5    potassium chloride SA (K-DUR,KLOR-CON) 20 MEQ tablet, Take 1 tablet (20 mEq total) by mouth once daily., Disp: 30 tablet, Rfl: 11      Vital signs:   Vitals:    10/19/22 1437   BP: 120/60   Pulse: 88   SpO2: (!) 94%   Height: 4' 11" (1.499 m)     Body mass index is 19.79 kg/m².  PHYSICAL EXAM:     Physical Exam  Constitutional:       General: She is not in acute distress.  Cardiovascular:      Rate and Rhythm: Normal rate and regular rhythm.      Pulses: Normal pulses.      Heart sounds: Normal heart sounds. No murmur heard.    No friction rub. No gallop.   Pulmonary:      Effort: " Pulmonary effort is normal.      Breath sounds: Normal breath sounds. No wheezing, rhonchi or rales.   Chest:       Abdominal:      General: Bowel sounds are normal. There is no distension.      Palpations: Abdomen is soft. There is no mass.      Tenderness: There is no abdominal tenderness. There is no guarding or rebound.   Musculoskeletal:      Cervical back: Neck supple.      Right lower leg: No edema.      Left lower leg: No edema.   Neurological:      Mental Status: She is alert.         PHQ4 = No data recorded    PERTINENT RESULTS:   Lab Results   Component Value Date    WBC 6.80 10/19/2022    HGB 10.4 (L) 10/19/2022    HCT 32.8 (L) 10/19/2022    MCV 92 10/19/2022     (L) 10/19/2022       BMP  Lab Results   Component Value Date     10/19/2022    K 4.2 10/19/2022     10/19/2022    CO2 27 10/19/2022    BUN 17 10/19/2022    CREATININE 0.70 10/19/2022    CALCIUM 9.2 10/19/2022    ANIONGAP 7 (L) 10/19/2022    ESTGFRAFRICA >60.0 10/18/2021    EGFRNONAA >60.0 10/18/2021     Lab Results   Component Value Date    ALT 7 (L) 10/19/2022    AST 13 (L) 10/19/2022    ALKPHOS 143 (H) 10/19/2022    BILITOT 0.5 10/19/2022     Lab Results   Component Value Date    CHOL 102 (L) 04/18/2021    CHOL 126 01/19/2021    CHOL 128 01/27/2019     Lab Results   Component Value Date    HDL 33 (L) 04/18/2021    HDL 41 01/19/2021    HDL 43 01/27/2019     Lab Results   Component Value Date    LDLCALC 46.0 (L) 04/18/2021    LDLCALC 70.0 01/19/2021    LDLCALC 69.8 01/27/2019     Lab Results   Component Value Date    TRIG 115 04/18/2021    TRIG 75 01/19/2021    TRIG 76 01/27/2019     Lab Results   Component Value Date    CHOLHDL 32.4 04/18/2021    CHOLHDL 32.5 01/19/2021    CHOLHDL 33.6 01/27/2019     Lab Results   Component Value Date    TSH 0.768 04/18/2021    C9RMKXH 7.3 12/03/2009     Lab Results   Component Value Date    HGBA1C 5.0 04/18/2021         ASSESSMENT/PLAN:  Problem List Items Addressed This Visit          Neuro     Dementia without behavioral disturbance - Primary    Overview     - supportive care  - son Manjinder Loera  - DNR         History of CVA (cerebrovascular accident) without residual deficits    Overview     - 04/19/2021 MRI brain:  Recent small infarct in the left parietal white matter and left cerebral lobe.  Questionable DWI hyperintensity, possible additional small infarcts in the right frontal matter and right parietal cortex.  Underlying embolic component not excluded.  Moderate to severe segmental narrowing of the left proximal superclinoid ICA.  Consider further evaluation with CTA  - son is aware of results and did not want to pursue further evaluation for the narrowing of the left proximal supraclinoid ICA  - she completed 21 days of Plavix 75 mg daily  - recommend aspirin 81 mg daily  - medical management  - patient on high-dose statin            Cardiac/Vascular    CAD in native artery    Overview     - followed by cardiology  - patient on high-dose statin okay to discontinue as long so side effects  - aspirin 81 mg daily  - BP goal less than 130/80 as long as no unwanted side effects         Chronic combined systolic and diastolic heart failure    Overview     - 01/19/2021 echo:  EF 20-25%.  Severe left ventricular global hypokinesis.  Grade 1 left diastolic dysfunction  - medical management  - no signs of acute decompensation  - followed by cardiology         Relevant Medications    carvediloL (COREG) 3.125 MG tablet    furosemide (LASIX) 20 MG tablet    potassium chloride SA (K-DUR,KLOR-CON) 20 MEQ tablet    Other Relevant Orders    CBC Auto Differential (Completed)    Comprehensive Metabolic Panel (Completed)    Essential hypertension    Overview     - well controlled  - continue current medicationd         Hyperlipemia    Overview     Lab Results   Component Value Date    LDLCALC 46.0 (L) 04/18/2021   - well controlled  - discontinue statin         NICM (nonischemic cardiomyopathy)    Overview     -  followed by cardiology  - medical management            ID    Chronic osteomyelitis of sacrum    Overview     - sacral decubital ulcer present since 2016  - status post debridement 04/09/2021 by Dr. Mckeon with E coli, P. Mirabilis and K. pneumonia status post outpatient Keflex course x2 weeks post debridement  - during hospitalization she initially was on empiric antibiotics and transitioned to cefepime 1 g IV q.12 hours and metronidazole 500 mg every 8 hours for UTI.  On discharge she was transition to cefpodoxime 200 mg twice   - 05/04/2021 MRI sacrum:  Abnormal signal of the distal aspect, likely chronic osteomyelitis.  Complex adjacent fluid collection worrisome for abscess  - followed by Wound care            Hematology    Thrombocytopenia, unspecified    Overview     Lab Results   Component Value Date     (L) 10/19/2022     Lab Results   Component Value Date    HGB 10.4 (L) 10/19/2022     Lab Results   Component Value Date    HCT 32.8 (L) 10/19/2022   - improved  - monitor              Oncology    Anemia, chronic disease    Overview     Lab Results   Component Value Date    WBC 6.80 10/19/2022    HGB 10.4 (L) 10/19/2022    HCT 32.8 (L) 10/19/2022    MCV 92 10/19/2022     (L) 10/19/2022   - improved             Relevant Medications    ferrous gluconate (FERGON) 324 MG tablet    Other Relevant Orders    Iron and TIBC    Malignant neoplasm of right female breast    Overview     - 8/4/2021 Pathology invasive ductal cell carcinoma Grade 1  - ER+ OH+ and HER-2 (-)  -patient and family opted not to treat         Relevant Medications    acetaminophen (TYLENOL) 325 MG tablet       GI    Gastroesophageal reflux disease    Overview     - doing well  - change PPI to PRN         Relevant Medications    pantoprazole (PROTONIX) 20 MG tablet       Orthopedic    Pressure ulcer of sacral region, stage 4    Overview     - followed by Wound Care             Palliative Care    Advanced care planning/counseling  discussion    Overview     - son Luz Maria Zuleta  - DNR-CC            ORDERS:   Orders Placed This Encounter    CBC Auto Differential    Comprehensive Metabolic Panel    Iron and TIBC    pantoprazole (PROTONIX) 20 MG tablet    carvediloL (COREG) 3.125 MG tablet    furosemide (LASIX) 20 MG tablet    ferrous gluconate (FERGON) 324 MG tablet    acetaminophen (TYLENOL) 325 MG tablet    potassium chloride SA (K-DUR,KLOR-CON) 20 MEQ tablet       Vaccines recommended:  COVID-19 B and  influenza vaccine    Today I discussed that I will no longer be practicing at [facility] effective 1/2023. My new location will be at [facility]. We assisted Gloria Zuleta in establishing with a new provider for follow-up. I encouraged Gloria Zuleta to notify me with an questions or concerns prior to my departure.     Follow-up in 6 months with new PCP or sooner if any concerns.      This note is dictated using the M*Modal Fluency Direct word recognition program. There are word recognition mistakes that are occasionally missed on review.    Dr. Betty Oakes D.O.   Family Medicine

## 2022-10-24 ENCOUNTER — TELEPHONE (OUTPATIENT)
Dept: FAMILY MEDICINE | Facility: CLINIC | Age: 87
End: 2022-10-24
Payer: MEDICARE

## 2022-10-24 NOTE — TELEPHONE ENCOUNTER
----- Message from Betty Oakes DO sent at 10/24/2022 12:40 PM CDT -----  Please call family regarding results.  1. Her blood counts have improved though her iron levels are still normal.  I want her to continue her current iron supplementation   2. Kidney function and liver function stable     Dr. Betty Oakes D.O.   [facility]

## 2022-10-24 NOTE — TELEPHONE ENCOUNTER
----- Message from Clara Ellsworth sent at 10/24/2022  1:37 PM CDT -----  Type:  Needs Medical Advice    Who Called:   Reason returning call   Would the patient rather a call back or a response via [facility]? call  Best Call Back Number: [phone]  Additional Information: none

## 2022-10-24 NOTE — TELEPHONE ENCOUNTER
Spoke with daughter.  Informed of her Dr. Oakes's message regarding blood results. Verbalized understanding.

## 2022-10-25 ENCOUNTER — TELEPHONE (OUTPATIENT)
Dept: PRIMARY CARE CLINIC | Facility: CLINIC | Age: 87
End: 2022-10-25
Payer: MEDICARE

## 2022-10-25 NOTE — TELEPHONE ENCOUNTER
----- Message from Betty Oakes DO sent at 10/24/2022 12:40 PM CDT -----  Please call family regarding results.  1. Her blood counts have improved though her iron levels are still normal.  I want her to continue her current iron supplementation   2. Kidney function and liver function stable     Dr. Betty Oakes D.O.   [facility]

## 2022-10-25 NOTE — TELEPHONE ENCOUNTER
Informed Ms. Zuleta about Ms. Gloria lab results. Ms. Zuleta will inform Ms. Gloria. Pt has no further questions or concerns.

## 2022-11-06 PROCEDURE — G0179 PR HOME HEALTH MD RECERTIFICATION: ICD-10-PCS | Mod: ,,, | Performed by: FAMILY MEDICINE

## 2022-11-06 PROCEDURE — G0179 MD RECERTIFICATION HHA PT: HCPCS | Mod: ,,, | Performed by: FAMILY MEDICINE

## 2022-11-16 ENCOUNTER — EXTERNAL HOME HEALTH (OUTPATIENT)
Dept: HOME HEALTH SERVICES | Facility: HOSPITAL | Age: 87
End: 2022-11-16
Payer: MEDICARE

## 2022-12-05 ENCOUNTER — HOSPITAL ENCOUNTER (OUTPATIENT)
Dept: WOUND CARE | Facility: HOSPITAL | Age: 87
Discharge: HOME | End: 2022-12-05
Attending: PREVENTIVE MEDICINE
Payer: MEDICARE

## 2022-12-05 VITALS
HEART RATE: 67 BPM | DIASTOLIC BLOOD PRESSURE: 53 MMHG | SYSTOLIC BLOOD PRESSURE: 113 MMHG | HEIGHT: 59 IN | WEIGHT: 98 LBS | TEMPERATURE: 98 F | BODY MASS INDEX: 19.76 KG/M2

## 2022-12-05 DIAGNOSIS — M86.68 CHRONIC OSTEOMYELITIS OF SACRUM: ICD-10-CM

## 2022-12-05 DIAGNOSIS — L89.154 PRESSURE INJURY OF SACRAL REGION, STAGE 4: Primary | ICD-10-CM

## 2022-12-05 DIAGNOSIS — R53.81 DEBILITY: ICD-10-CM

## 2022-12-05 PROCEDURE — 99214 OFFICE O/P EST MOD 30 MIN: CPT

## 2022-12-05 NOTE — PROGRESS NOTES
[facility]    Subjective:       Patient ID: Gloria Zuleta is a 89 y.o. female.    Chief Complaint: Wound Care and Pressure Ulcer    Follow up for her sacral ulcer.  Daughter states there has been more drainage since switching to [employer]. There is a skin tear to left lateral leg which started from a traumatic injury of hitting her leg on her wheelchair a couple weeks ago but it is doing well.  Review of Systems   All other systems reviewed and are negative.      Objective:      Physical Exam       Altered Skin Integrity 04/18/21 1550 Coccyx Full thickness tissue loss with exposed bone, tendon, or muscle. Often includes undermining and tunneling. May extend into muscle and/or supporting structures. (Active)   04/18/21 1550   Altered Skin Integrity Present on Admission: yes   Side:    Orientation:    Location: Coccyx   Wound Number:    Is this injury device related?:    Primary Wound Type:    Description of Altered Skin Integrity: Full thickness tissue loss with exposed bone, tendon, or muscle. Often includes undermining and tunneling. May extend into muscle and/or supporting structures.   Ankle-Brachial Index:    Pulses:    Removal Indication and Assessment:    Wound Outcome: Palliative   (Retired) Wound Length (cm):    (Retired) Wound Width (cm):    (Retired) Depth (cm):    Wound Description (Comments):    Removal Indications:    Wound Image   12/05/22 1000   Dressing Appearance Intact;Moist drainage 12/05/22 1000   Drainage Amount Moderate 12/05/22 1000   Drainage Characteristics/Odor Yellow 12/05/22 1000   Appearance Red;Moist 12/05/22 1000   Tissue loss description Full thickness 12/05/22 1000   Red (%), Wound Tissue Color 100 % 12/05/22 1000   Periwound Area Intact;Pink;Dry 12/05/22 1000   Wound Edges Defined 12/05/22 1000   Wound Length (cm) 3 cm 12/05/22 1000   Wound Width (cm) 1 cm 12/05/22 1000   Wound Depth (cm) 1.4 cm 12/05/22 1000   Wound Volume (cm^3) 4.2  cm^3 12/05/22 1000   Wound Surface Area (cm^2) 3 cm^2 12/05/22 1000   Undermining (depth (cm)/location) 3.9cm@9-4oclock 12/05/22 1000   Care Cleansed with:;Antimicrobial agent;Soap and water;Sterile normal saline 12/05/22 1000   Dressing Applied;Hydrofiber;Foam;Island/border 12/05/22 1000   Packing packed with;hydrofiber/alginate 12/05/22 1000   Packing Inserted  1 12/05/22 1000   Packing Removed 0 12/05/22 1000   Periwound Care Skin barrier film applied 12/05/22 1000   Dressing Change Due 12/07/22 12/05/22 1000            Altered Skin Integrity 12/05/22 1000 Left proximal;lateral Calf Skin Tear (Active)   12/05/22 1000   Altered Skin Integrity Present on Admission:    Side: Left   Orientation: proximal;lateral   Location: Calf   Wound Number:    Is this injury device related?:    Primary Wound Type: Skin tear   Description of Altered Skin Integrity:    Ankle-Brachial Index:    Pulses:    Removal Indication and Assessment:    Wound Outcome:    (Retired) Wound Length (cm):    (Retired) Wound Width (cm):    (Retired) Depth (cm):    Wound Description (Comments):    Removal Indications:    Wound Image   12/05/22 1000   Dressing Appearance Open to air 12/05/22 1000   Drainage Amount None 12/05/22 1000   Appearance Eschar;Dry 12/05/22 1000   Tissue loss description Not applicable 12/05/22 1000   Black (%), Wound Tissue Color 100 % 12/05/22 1000   Periwound Area Intact;Dry 12/05/22 1000   Wound Edges Rolled/closed 12/05/22 1000   Wound Length (cm) 0.7 cm 12/05/22 1000   Wound Width (cm) 0.9 cm 12/05/22 1000   Wound Depth (cm) 0 cm 12/05/22 1000   Wound Volume (cm^3) 0 cm^3 12/05/22 1000   Wound Surface Area (cm^2) 0.63 cm^2 12/05/22 1000   Care Cleansed with:;Antimicrobial agent;Soap and water;Sterile normal saline 12/05/22 1000   Dressing Applied;Foam;Island/border 12/05/22 1000   Periwound Care Skin barrier film applied 12/05/22 1000   Dressing Change Due 12/12/22 12/05/22 1000         Assessment/Plan:              ICD-10-CM ICD-9-CM   1. Pressure injury of sacral region, stage 4  L89.154 707.03     707.24   2. Chronic osteomyelitis of sacrum  M86.68 730.18   3. Debility  R53.81 799.3           Tissue pathology and/or culture taken:  [] Yes [x] No   Sharp debridement performed:   [] Yes [x] No   Labs ordered this visit:   [] Yes [x] No   Imaging ordered this visit:   [] Yes [x] No           Orders Placed This Encounter   Procedures    Change dressing     Sacral Wound   Cleanse wound with: Vashe, rinse with Normal Saline   Lidocaine:PRN   Silver nitrate:PRN   Periwound care:Cavilon; Gentian violet to wound edges PRN maceration   Primary dressing: Vashe moistened hydrofera classic gently pack to wound bed and undermining. (HYDROFERA CLASSIC ONLY)   Secondary dressing: [place] border dressing.   Offloading: Roho Cushion while sitting in wheel chair, Low airloss mattress while lying in bed. Change position  2 hours while lying in bed.     Bilateral Lower Leg   Edema Control: Elevate BLE as much as possible while lying in bed. Change position every 2 hours.   Bilateral Feet. Use Moisturizer to legs and feet every day.     Right lateral leg skin tear  Cleanse wound with: Vashe, rinse with Normal Saline   Lidocaine:PRN   Silver nitrate:PRN   Periwound care:Cavilon  Primary dressing: border dressing  Frequency: weekly    Frequency: Mon, Wed, fri and PRN.     Home Health to change dressing as ordered above.     Follow-up: 2 months with Dr Reyes 1/30/23    Change dressing     Sacral Wound   Cleanse wound with: Vashe, rinse with Normal Saline   Lidocaine:PRN   Silver nitrate:PRN   Periwound care:Cavilon; Gentian violet to wound edges PRN maceration   Primary dressing: Vashe moistened hydrofera classic gently pack to wound bed and undermining. (HYDROFERA CLASSIC ONLY)   Secondary dressing: [place] border dressing.   Offloading: Roho Cushion while sitting in wheel chair, Low airloss mattress while lying in bed. Change position  " 2 hours while lying in bed.     Bilateral Lower Leg   Edema Control: Elevate BLE as much as possible while lying in bed. Change position every 2 hours.   Bilateral Feet. Use Moisturizer to legs and feet every day.     Left lateral leg skin tear   Cleanse wound with: Vashe, rinse with Normal Saline   Lidocaine:PRN   Silver nitrate:PRN   Periwound care:Cavilon   Primary dressing: border dressing   Frequency: weekly     Frequency: Mon, Wed, fri and PRN.     Home Health to change dressing as ordered above.     Follow-up: 2 months with Dr Reyes 1/30/23        Follow up in about 8 weeks (around 1/30/2023) for Dr Reyes.             Nurse's note.  Follow up with Dr Reyes related to sacral pressure injury.  Family member concerned about increase in drainage, does not like the silver alginate.  Skin tear present to left lateral leg, family states "it started a couple of weeks ago" and that they think she hit her leg on the wheelchair, they have been applying medihoney.  Plan of care updated.  Orders faxed to home health.  Follow up with Dr Reyes in 8 weeks on 1/30/23.      "

## 2022-12-22 ENCOUNTER — TELEPHONE (OUTPATIENT)
Dept: FAMILY MEDICINE | Facility: CLINIC | Age: 87
End: 2022-12-22
Payer: MEDICARE

## 2023-01-30 ENCOUNTER — HOSPITAL ENCOUNTER (OUTPATIENT)
Dept: WOUND CARE | Facility: HOSPITAL | Age: 88
Discharge: HOME | End: 2023-01-30
Attending: PREVENTIVE MEDICINE
Payer: MEDICARE

## 2023-01-30 VITALS
SYSTOLIC BLOOD PRESSURE: 108 MMHG | DIASTOLIC BLOOD PRESSURE: 56 MMHG | BODY MASS INDEX: 19.76 KG/M2 | HEART RATE: 66 BPM | WEIGHT: 98 LBS | TEMPERATURE: 97 F | HEIGHT: 59 IN

## 2023-01-30 DIAGNOSIS — M86.68 CHRONIC OSTEOMYELITIS OF SACRUM: ICD-10-CM

## 2023-01-30 DIAGNOSIS — I10 ESSENTIAL HYPERTENSION: ICD-10-CM

## 2023-01-30 DIAGNOSIS — R53.81 DEBILITY: ICD-10-CM

## 2023-01-30 DIAGNOSIS — L89.154 PRESSURE INJURY OF SACRAL REGION, STAGE 4: Primary | ICD-10-CM

## 2023-01-30 PROCEDURE — 99213 OFFICE O/P EST LOW 20 MIN: CPT | Mod: HCNC

## 2023-01-30 NOTE — PROGRESS NOTES
[facility]    Subjective:       Patient ID: Gloria Zuleta is a 89 y.o. female.    Chief Complaint: Wound Care    Wound remains stable. No apparent signs of infection. Continuing same plan of care. Caregiver voiced understanding.  Review of Systems   All other systems reviewed and are negative.      Objective:     Vitals:    01/30/23 1029   BP: (!) 108/56   Pulse: 66   Temp: 97.3 °F (36.3 °C)         Physical Exam       Altered Skin Integrity 04/18/21 1550 Coccyx Full thickness tissue loss with exposed bone, tendon, or muscle. Often includes undermining and tunneling. May extend into muscle and/or supporting structures. (Active)   04/18/21 1550   Altered Skin Integrity Present on Admission: yes   Side:    Orientation:    Location: Coccyx   Wound Number:    Is this injury device related?:    Primary Wound Type:    Description of Altered Skin Integrity: Full thickness tissue loss with exposed bone, tendon, or muscle. Often includes undermining and tunneling. May extend into muscle and/or supporting structures.   Ankle-Brachial Index:    Pulses:    Removal Indication and Assessment:    Wound Outcome: Palliative   (Retired) Wound Length (cm):    (Retired) Wound Width (cm):    (Retired) Depth (cm):    Wound Description (Comments):    Removal Indications:    Wound Image   01/30/23 1023   Dressing Appearance Intact;Moist drainage 01/30/23 1023   Drainage Amount Moderate 01/30/23 1023   Drainage Characteristics/Odor Serosanguineous 01/30/23 1023   Appearance Red;Moist 01/30/23 1023   Tissue loss description Full thickness 01/30/23 1023   Red (%), Wound Tissue Color 100 % 01/30/23 1023   Periwound Area Intact;Dry 01/30/23 1023   Wound Edges Defined;Rolled/closed 01/30/23 1023   Wound Length (cm) 3 cm 01/30/23 1023   Wound Width (cm) 1 cm 01/30/23 1023   Wound Depth (cm) 1.3 cm 01/30/23 1023   Wound Volume (cm^3) 3.9 cm^3 01/30/23 1023   Wound Surface Area (cm^2) 3 cm^2  01/30/23 1023   Undermining (depth (cm)/location) 3.5cm@4-9 01/30/23 1023   Care Cleansed with:;Antimicrobial agent 01/30/23 1023   Dressing Applied;Hydrofiber;Island/border 01/30/23 1023   Periwound Care Skin barrier film applied 01/30/23 1023   Dressing Change Due 02/01/23 01/30/23 1023         Assessment/Plan:         ICD-10-CM ICD-9-CM   1. Pressure injury of sacral region, stage 4  L89.154 707.03     707.24   2. Chronic osteomyelitis of sacrum  M86.68 730.18   3. Debility  R53.81 799.3   4. Essential hypertension  I10 401.9           Tissue pathology and/or culture taken:  [] Yes [x] No   Sharp debridement performed:   [] Yes [x] No   Labs ordered this visit:   [] Yes [x] No   Imaging ordered this visit:   [] Yes [x] No           Orders Placed This Encounter   Procedures    Change dressing     Sacral Wound   Cleanse wound with: Vashe, rinse with Normal Saline   Lidocaine:PRN   Silver nitrate:PRN   Periwound care:Cavilon; Gentian violet to wound edges PRN maceration   Primary dressing: Vashe moistened hydrofera classic gently pack to wound bed and undermining.   Secondary dressing: [place] border dressing.   Offloading: Roho Cushion while sitting in wheel chair, Low airloss mattress while lying in bed. Change position  2 hours while lying in bed.         Frequency: Mon, Wed, fri and PRN.     Home Health to change dressing as ordered above.     Follow-up: 2 months with Dr Reyes        Follow up in about 8 weeks (around 3/27/2023).

## 2023-03-06 PROCEDURE — G0179 PR HOME HEALTH MD RECERTIFICATION: ICD-10-PCS | Mod: ,,, | Performed by: FAMILY MEDICINE

## 2023-03-06 PROCEDURE — G0179 MD RECERTIFICATION HHA PT: HCPCS | Mod: ,,, | Performed by: FAMILY MEDICINE

## 2023-03-17 ENCOUNTER — EXTERNAL HOME HEALTH (OUTPATIENT)
Dept: HOME HEALTH SERVICES | Facility: HOSPITAL | Age: 88
End: 2023-03-17
Payer: MEDICARE

## 2023-03-31 ENCOUNTER — HOSPITAL ENCOUNTER (OUTPATIENT)
Dept: WOUND CARE | Facility: HOSPITAL | Age: 88
Discharge: HOME | End: 2023-03-31
Attending: PREVENTIVE MEDICINE
Payer: MEDICARE

## 2023-03-31 VITALS
DIASTOLIC BLOOD PRESSURE: 60 MMHG | TEMPERATURE: 96 F | SYSTOLIC BLOOD PRESSURE: 122 MMHG | HEART RATE: 66 BPM | WEIGHT: 98 LBS | HEIGHT: 59 IN | BODY MASS INDEX: 19.76 KG/M2

## 2023-03-31 DIAGNOSIS — L89.154 PRESSURE INJURY OF SACRAL REGION, STAGE 4: Primary | ICD-10-CM

## 2023-03-31 DIAGNOSIS — M86.68 CHRONIC OSTEOMYELITIS OF SACRUM: ICD-10-CM

## 2023-03-31 PROCEDURE — 99214 OFFICE O/P EST MOD 30 MIN: CPT | Mod: HCNC

## 2023-03-31 NOTE — PROGRESS NOTES
[facility]    Subjective:       Patient ID: Gloria Zuleta is a 89 y.o. female.    Chief Complaint: Wound Care and Pressure Ulcer    Follow up related to sacral pressure injury.  Patient's family member with patient, no new complaints.  Home health was decreased from three times weekly to twice weekly by insurance.      Review of Systems   All other systems reviewed and are negative.      Objective:     Vitals:    03/31/23 1019   BP: 122/60   Pulse: 66   Temp: 96 °F (35.6 °C)         Physical Exam       Altered Skin Integrity 04/18/21 1550 Coccyx Full thickness tissue loss with exposed bone, tendon, or muscle. Often includes undermining and tunneling. May extend into muscle and/or supporting structures. (Active)   04/18/21 1550   Altered Skin Integrity Present on Admission - Did Patient arrive to the hospital with altered skin?: yes   Side:    Orientation:    Location: Coccyx   Wound Number:    Is this injury device related?:    Primary Wound Type:    Description of Altered Skin Integrity: Full thickness tissue loss with exposed bone, tendon, or muscle. Often includes undermining and tunneling. May extend into muscle and/or supporting structures.   Ankle-Brachial Index:    Pulses:    Removal Indication and Assessment:    Wound Outcome: Palliative   (Retired) Wound Length (cm):    (Retired) Wound Width (cm):    (Retired) Depth (cm):    Wound Description (Comments):    Removal Indications:    Wound Image   03/31/23 1000   Dressing Appearance Intact;Moist drainage 03/31/23 1000   Drainage Amount Moderate 03/31/23 1000   Drainage Characteristics/Odor Yellow 03/31/23 1000   Appearance Red;Pink;Epithelialization;Moist 03/31/23 1000   Tissue loss description Full thickness 03/31/23 1000   Red (%), Wound Tissue Color 100 % 03/31/23 1000   Periwound Area Macerated;Dry;Intact 03/31/23 1000   Wound Edges Defined;Rolled/closed 03/31/23 1000   Wound Length (cm) 2.3 cm 03/31/23  1000   Wound Width (cm) 1.4 cm 03/31/23 1000   Wound Depth (cm) 1 cm 03/31/23 1000   Wound Volume (cm^3) 3.22 cm^3 03/31/23 1000   Wound Surface Area (cm^2) 3.22 cm^2 03/31/23 1000   Undermining (depth (cm)/location) 2.3cm@4-7oclock 03/31/23 1000   Care Cleansed with:;Antimicrobial agent;Soap and water;Sterile normal saline 03/31/23 1000   Dressing Applied;Hydrofiber;Island/border;Foam 03/31/23 1000   Packing packed with;hydrofiber/alginate 03/31/23 1000   Packing Inserted  1 03/31/23 1000   Packing Removed 0 03/31/23 1000   Periwound Care Skin barrier film applied;Topical treatment applied 03/31/23 1000   Dressing Change Due 04/03/23 03/31/23 1000         Assessment/Plan:       Wound is improving. New epithelium present.        ICD-10-CM ICD-9-CM   1. Pressure injury of sacral region, stage 4  L89.154 707.03     707.24   2. Chronic osteomyelitis of sacrum  M86.68 730.18           Tissue pathology and/or culture taken:  [] Yes [x] No   Sharp debridement performed:   [] Yes [x] No   Labs ordered this visit:   [] Yes [x] No   Imaging ordered this visit:   [] Yes [x] No           Orders Placed This Encounter   Procedures    Change dressing     Sacral Wound   Cleanse wound with: Vashe, rinse with Normal Saline   Lidocaine:PRN   Silver nitrate:PRN   Periwound care:Cavilon  Primary dressing: Vashe moistened hydrofera classic gently pack to wound bed and undermining.   Secondary dressing: [place] border dressing.   Offloading: Roho Cushion while sitting in wheel chair, Low airloss mattress while lying in bed. Change position  2 hours while lying in bed.     Frequency: Mon, Wed, fri and PRN.     Home Health: Please continue wound care as above on Mondays, Fridays and as needed except when the patient is in clinic.  Please leave supplies for family to change dressing on Wednesdays and as needed.  Thank you.    Follow-up: 2 months with Dr Reyes  Other orders: one time dosage of gentian violet applied to periwound on  3/31/23        Follow up in about 8 weeks (around 5/26/2023) for Dr Reyes.

## 2023-04-21 DIAGNOSIS — L89.154 PRESSURE INJURY OF SACRAL REGION, STAGE 4: Primary | ICD-10-CM

## 2023-05-09 ENCOUNTER — TELEPHONE (OUTPATIENT)
Dept: WOUND CARE | Facility: HOSPITAL | Age: 88
End: 2023-05-09
Payer: MEDICARE

## 2023-05-09 NOTE — TELEPHONE ENCOUNTER
Melinda with the [facility] called to request the last progress note be sent to them as they have assumed care.  Called and spoke to patient's daughter Helga who verified that the patient's wounds are now being taken care of by [facility] for home health.  Progress note sent.    ----- Message from Evelin Kim RN sent at 5/9/2023 12:38 PM CDT -----  Patient's home health nurseMelinda with the Medical [facility] is requesting a call back #[phone]

## 2023-05-30 ENCOUNTER — EXTERNAL HOME HEALTH (OUTPATIENT)
Dept: HOME HEALTH SERVICES | Facility: HOSPITAL | Age: 88
End: 2023-05-30
Payer: MEDICARE

## 2023-06-05 ENCOUNTER — HOSPITAL ENCOUNTER (OUTPATIENT)
Dept: WOUND CARE | Facility: HOSPITAL | Age: 88
Discharge: HOME | End: 2023-06-05
Attending: PREVENTIVE MEDICINE
Payer: MEDICARE

## 2023-06-05 ENCOUNTER — TELEPHONE (OUTPATIENT)
Dept: PRIMARY CARE CLINIC | Facility: CLINIC | Age: 88
End: 2023-06-05
Payer: MEDICARE

## 2023-06-05 VITALS — SYSTOLIC BLOOD PRESSURE: 107 MMHG | DIASTOLIC BLOOD PRESSURE: 61 MMHG | HEART RATE: 69 BPM

## 2023-06-05 DIAGNOSIS — M86.68 CHRONIC OSTEOMYELITIS OF SACRUM: ICD-10-CM

## 2023-06-05 DIAGNOSIS — L89.154 PRESSURE INJURY OF SACRAL REGION, STAGE 4: Primary | ICD-10-CM

## 2023-06-05 PROCEDURE — 99213 OFFICE O/P EST LOW 20 MIN: CPT

## 2023-06-05 RX ORDER — MENTHOL AND ZINC OXIDE .44; 20.625 G/100G; G/100G
OINTMENT TOPICAL
Qty: 71 G | Refills: 1 | Status: SHIPPED | OUTPATIENT
Start: 2023-06-05

## 2023-06-05 RX ORDER — OLOPATADINE HYDROCHLORIDE 1 MG/ML
1 SOLUTION/ DROPS OPHTHALMIC 2 TIMES DAILY
Qty: 5 ML | Refills: 5 | Status: SHIPPED | OUTPATIENT
Start: 2023-06-05 | End: 2024-06-04

## 2023-06-05 NOTE — TELEPHONE ENCOUNTER
Medication refill however patient needs to be seen for further refills.  She also needs to be seen for hospital follow-up.  If she is unable to make a visit in a closer PCP I did discuss that with her and family at the last visit.  She will need a new PCP to continue medications and home health.  Home health requires that patient was seen within 1 month.    Dr. Betty Oakes D.O.   Family Medicine

## 2023-06-05 NOTE — TELEPHONE ENCOUNTER
----- Message from Oneyda Calderon sent at 6/5/2023 11:14 AM CDT -----  Regarding: Refills  Contact: [phone]  Type:  RX Refill Request    Who Called: PT   Refill or New Rx: Refills   RX Name and Strength: olopatadine (PATANOL) 0.1 % ophthalmic solution 5 mL   How is the patient currently taking it? (ex. 1XDay): Place 1 drop into both eyes 2 (two) times daily. - Both Eyes  Is this a 30 day or 90 day RX: 30  Preferred Pharmacy with phone number: [place], [place] - [address] [facility] Phone:  [phone] Fax:  [phone]

## 2023-06-05 NOTE — TELEPHONE ENCOUNTER
Called pt and left a voicemail to her scheduled with a PCP or if she would like to contuine to see  she would need to make an appointment to re est care.

## 2023-06-05 NOTE — PROGRESS NOTES
[facility]    Subjective:       Patient ID: Gloria Zuleta is a 89 y.o. female.    Chief Complaint: Non-healing Wound Follow Up and Pressure Ulcer    6/5/23 Patient seen for non-healing sacral wound.  Epithelization noted in wound bed.  New orders routed to home health.  Family member educated on dressing changes.   Review of Systems   All other systems reviewed and are negative.      Objective:     Vitals:    06/05/23 1133   BP: 107/61   Pulse: 69         Physical Exam       Altered Skin Integrity 04/18/21 1550 Coccyx Full thickness tissue loss with exposed bone, tendon, or muscle. Often includes undermining and tunneling. May extend into muscle and/or supporting structures. (Active)   04/18/21 1550   Altered Skin Integrity Present on Admission - Did Patient arrive to the hospital with altered skin?: yes   Side:    Orientation:    Location: Coccyx   Wound Number:    Is this injury device related?:    Primary Wound Type:    Description of Altered Skin Integrity: Full thickness tissue loss with exposed bone, tendon, or muscle. Often includes undermining and tunneling. May extend into muscle and/or supporting structures.   Ankle-Brachial Index:    Pulses:    Removal Indication and Assessment:    Wound Outcome: Palliative   (Retired) Wound Length (cm):    (Retired) Wound Width (cm):    (Retired) Depth (cm):    Wound Description (Comments):    Removal Indications:    Wound Image    06/05/23 1024   Description of Altered Skin Integrity Full thickness tissue loss with exposed bone, tendon, or muscle. Often includes undermining and tunneling. May extend into muscle and/or supporting structures. 06/05/23 1024   Dressing Appearance Moist drainage 06/05/23 1024   Drainage Amount Small 06/05/23 1024   Drainage Characteristics/Odor Serous 06/05/23 1024   Appearance Red;Epithelialization 06/05/23 1024   Tissue loss description Full thickness 06/05/23 1024   Red (%), Wound  Tissue Color 100 % 06/05/23 1024   Periwound Area Redness;Indurated;Dry 06/05/23 1024   Wound Edges Defined;Rolled/closed 06/05/23 1024   Wound Length (cm) 2.3 cm 06/05/23 1024   Wound Width (cm) 1.4 cm 06/05/23 1024   Wound Depth (cm) 0.5 cm 06/05/23 1024   Wound Volume (cm^3) 1.61 cm^3 06/05/23 1024   Wound Surface Area (cm^2) 3.22 cm^2 06/05/23 1024   Undermining (depth (cm)/location) 8-4=2.0cm 06/05/23 1024   Care Cleansed with:;Antimicrobial agent;Sterile normal saline 06/05/23 1024   Dressing Applied;Hydrofiber 06/05/23 1024   Packing packed with 06/05/23 1024   Periwound Care Absorptive dressing applied;Topical treatment applied 06/05/23 1024   Dressing Change Due 06/07/23 06/05/23 1024         Assessment/Plan:         ICD-10-CM ICD-9-CM   1. Pressure injury of sacral region, stage 4  L89.154 707.03     707.24   2. Chronic osteomyelitis of sacrum  M86.68 730.18           Tissue pathology and/or culture taken:  [] Yes [x] No   Sharp debridement performed:   [] Yes [x] No   Labs ordered this visit:   [] Yes [x] No   Imaging ordered this visit:   [] Yes [x] No           Orders Placed This Encounter   Procedures    Change dressing     Sacral Wound     Cleanse wound with: Vashe soak x 2 min, rinse with Normal Saline   Lidocaine:PRN   Silver nitrate:PRN   Periwound care:  Betamethasone to periwound redness  Primary dressing: NS moistened hydrofera classic gently pack to wound bed and undermining.   Secondary dressing: ABD+tape until erythema persists then bordered dressing  Offloading: Roho Cushion while sitting in wheel chair, Low airloss mattress while lying in bed. Change position  2 hours while lying in bed.     Frequency: Mon, Wed, Fri and PRN.     Home Health: [facility] continue wound care as above on Mondays, Fridays and as needed except when the patient is in clinic.  Please leave supplies for family to change dressing on Wednesdays and as needed.  Thank you.     Follow-up: 2 months with Dr Reyes    Other orders: Betamethasone e-scribed to pharmacy        Follow up in about 8 weeks (around 7/31/2023) for Dr Reyes.

## 2023-07-06 ENCOUNTER — DOCUMENT SCAN (OUTPATIENT)
Dept: HOME HEALTH SERVICES | Facility: HOSPITAL | Age: 88
End: 2023-07-06
Payer: MEDICARE

## 2023-07-18 ENCOUNTER — EXTERNAL HOME HEALTH (OUTPATIENT)
Dept: HOME HEALTH SERVICES | Facility: HOSPITAL | Age: 88
End: 2023-07-18
Payer: MEDICARE

## 2023-07-21 ENCOUNTER — EXTERNAL HOME HEALTH (OUTPATIENT)
Dept: HOME HEALTH SERVICES | Facility: HOSPITAL | Age: 88
End: 2023-07-21
Payer: MEDICARE

## 2023-07-31 ENCOUNTER — HOSPITAL ENCOUNTER (OUTPATIENT)
Dept: WOUND CARE | Facility: HOSPITAL | Age: 88
Discharge: HOME | End: 2023-07-31
Attending: PREVENTIVE MEDICINE
Payer: MEDICARE

## 2023-07-31 DIAGNOSIS — R53.81 DEBILITY: ICD-10-CM

## 2023-07-31 DIAGNOSIS — L89.154 PRESSURE INJURY OF SACRAL REGION, STAGE 4: Primary | ICD-10-CM

## 2023-07-31 DIAGNOSIS — M86.68 CHRONIC OSTEOMYELITIS OF SACRUM: ICD-10-CM

## 2023-07-31 PROCEDURE — 99212 OFFICE O/P EST SF 10 MIN: CPT | Mod: HCNC

## 2023-07-31 NOTE — PROGRESS NOTES
[facility]    Subjective:       Patient ID: Gloria Zuleta is a 89 y.o. female.    Chief Complaint: Wound Care    Follow Up and Pressure Ulcer. No new concerns. Continue with the same plan of care.   Review of Systems   All other systems reviewed and are negative.        Objective:   There were no vitals filed for this visit.      Physical Exam       Altered Skin Integrity 04/18/21 1550 Coccyx Full thickness tissue loss with exposed bone, tendon, or muscle. Often includes undermining and tunneling. May extend into muscle and/or supporting structures. (Active)   04/18/21 1550   Altered Skin Integrity Present on Admission - Did Patient arrive to the hospital with altered skin?: yes   Side:    Orientation:    Location: Coccyx   Wound Number:    Is this injury device related?:    Primary Wound Type:    Description of Altered Skin Integrity: Full thickness tissue loss with exposed bone, tendon, or muscle. Often includes undermining and tunneling. May extend into muscle and/or supporting structures.   Ankle-Brachial Index:    Pulses:    Removal Indication and Assessment:    Wound Outcome: Palliative   (Retired) Wound Length (cm):    (Retired) Wound Width (cm):    (Retired) Depth (cm):    Wound Description (Comments):    Removal Indications:    Wound Image   07/31/23 1115   Dressing Appearance Intact;Moist drainage 07/31/23 1115   Drainage Amount Small 07/31/23 1115   Drainage Characteristics/Odor Serous 07/31/23 1115   Appearance Intact;Red;Epithelialization 07/31/23 1115   Tissue loss description Full thickness 07/31/23 1115   Red (%), Wound Tissue Color 100 % 07/31/23 1115   Periwound Area Intact;Redness;Indurated 07/31/23 1115   Wound Edges Defined 07/31/23 1115   Wound Length (cm) 2.2 cm 07/31/23 1115   Wound Width (cm) 1.2 cm 07/31/23 1115   Wound Depth (cm) 0.5 cm 07/31/23 1115   Wound Volume (cm^3) 1.32 cm^3 07/31/23 1115   Wound Surface Area (cm^2) 2.64 cm^2  07/31/23 1115   Undermining (depth (cm)/location) 2.0 @ 8-4 07/31/23 1115   Care Cleansed with:;Antimicrobial agent;Soap and water;Sterile normal saline 07/31/23 1115   Dressing Applied;Changed 07/31/23 1115   Packing packed with;hydrofiber/alginate 07/31/23 1115   Packing Inserted  1 07/31/23 1115   Packing Removed 1 07/31/23 1115   Periwound Care Absorptive dressing applied 07/31/23 1115   Dressing Change Due 08/02/23 07/31/23 1115         Assessment/Plan:         ICD-10-CM ICD-9-CM   1. Pressure injury of sacral region, stage 4  L89.154 707.03     707.24       Wound is slowly improving. No signs of infection or necrosis.      Tissue pathology and/or culture taken:  [] Yes [] No   Sharp debridement performed:   [] Yes [] No   Labs ordered this visit:   [] Yes [] No   Imaging ordered this visit:   [] Yes [] No           Orders Placed This Encounter   Procedures    Change dressing     Sacral Wound     Cleanse wound with: Vashe soak x 2 min, rinse with Normal Saline   Lidocaine:PRN   Silver nitrate:PRN   Periwound care:  Betamethasone to periwound redness   Primary dressing: NS moistened hydrofera classic gently pack to wound bed and undermining.   Secondary dressing: bordered dressing   Offloading: Roho Cushion while sitting in wheel chair, Low airloss mattress while lying in bed. Change position  2 hours while lying in bed.     Frequency: Mon, Wed, Fri and PRN.     Home Health: [facility] continue wound care as above on Mondays, Fridays and as needed except when the patient is in clinic.  Please leave supplies for family to change dressing on Wednesdays and as needed.      Follow-up: 3 months with Dr Reyes        Follow up in about 3 months (around 10/31/2023).            This includes face to face time and non-face to face time preparing to see the patient (eg, review of tests), obtaining and/or reviewing separately obtained history, documenting clinical information in the electronic or other health record,  independently interpreting results and communicating results to the patient/family/caregiver, or care coordinator.

## 2023-08-14 ENCOUNTER — TELEPHONE (OUTPATIENT)
Dept: PRIMARY CARE CLINIC | Facility: CLINIC | Age: 88
End: 2023-08-14
Payer: MEDICARE

## 2023-08-14 NOTE — TELEPHONE ENCOUNTER
----- Message from Greta May sent at 8/14/2023  3:57 PM CDT -----  Type:  Needs Medical Advice    Who Called:  Pt Daughter     Would the patient rather a call back or a response via [facility]?  call  Best Call Back Number:  [phone]  Additional Information:  Pt would like a call back from RN.

## 2023-08-14 NOTE — TELEPHONE ENCOUNTER
Pt daughter called to see if we can send in a referral for physical therapy. Pt daughter states that  needs help to strengthen her legs.

## 2023-08-15 NOTE — TELEPHONE ENCOUNTER
Gloria Fournier will need to be seen. Last seen 10/2022 and lives in [facility]. She will need to decide if she will change to me in [facility] or stay with a new provider in [place]    Dr. Betty Oakes D.O.   Family Medicine

## 2023-08-22 ENCOUNTER — TELEPHONE (OUTPATIENT)
Dept: PRIMARY CARE CLINIC | Facility: CLINIC | Age: 88
End: 2023-08-22
Payer: MEDICARE

## 2023-08-22 NOTE — TELEPHONE ENCOUNTER
----- Message from Margie Roth sent at 8/22/2023 10:33 AM CDT -----  Regarding: Rafael[facility]  Contact: Rafael[facility]/[phone]  Rafael[facility] is requesting orders for Physical Therapy.   Would the patient rather a call back or a response via [facility]?  Call with verbal or fax  Best Call Back Number:  Rafael[facility]/[phone]  Additional Information:  Fax: [phone]

## 2023-08-22 NOTE — TELEPHONE ENCOUNTER
----- Message from Margie Roth sent at 8/22/2023 10:33 AM CDT -----  Regarding: [facility]  Contact: Rafael[facility]/[phone]  Rafael[facility] is requesting orders for Physical Therapy.   Would the patient rather a call back or a response via [facility]?  Call with verbal or fax  Best Call Back Number:  Rafael[facility]/[phone]  Additional Information:  Fax: [phone]

## 2023-09-28 DIAGNOSIS — I50.42 CHRONIC COMBINED SYSTOLIC AND DIASTOLIC HEART FAILURE: ICD-10-CM

## 2023-09-28 DIAGNOSIS — I63.9 CEREBROVASCULAR ACCIDENT (CVA), UNSPECIFIED MECHANISM: ICD-10-CM

## 2023-09-28 RX ORDER — CARVEDILOL 3.12 MG/1
TABLET ORAL
Qty: 60 TABLET | Refills: 0 | Status: SHIPPED | OUTPATIENT
Start: 2023-09-28 | End: 2023-11-01

## 2023-09-28 RX ORDER — ASPIRIN 81 MG/1
TABLET ORAL
Qty: 30 TABLET | Refills: 0 | Status: SHIPPED | OUTPATIENT
Start: 2023-09-28 | End: 2023-10-31

## 2023-09-28 RX ORDER — POTASSIUM CHLORIDE 20 MEQ/1
20 TABLET, EXTENDED RELEASE ORAL
Qty: 30 TABLET | Refills: 0 | Status: SHIPPED | OUTPATIENT
Start: 2023-09-28 | End: 2023-10-27

## 2023-09-28 RX ORDER — FUROSEMIDE 20 MG/1
20 TABLET ORAL
Qty: 30 TABLET | Refills: 0 | Status: SHIPPED | OUTPATIENT
Start: 2023-09-28 | End: 2023-10-27

## 2023-09-28 NOTE — TELEPHONE ENCOUNTER
Refill Routing Note   Medication(s) are not appropriate for processing by [facility] for the following reason(s):      Medication outside of protocol: Requirement not met--established PCP    [facility] action(s):  Route Care Due:  Appointment due   Medication Therapy Plan:         Appointments  past 12m or future 3m with PCP    Date Provider   Last Visit   10/19/2022 Betty Oakes, DO   Next Visit   Visit date not found Betty Oakes, DO   ED visits in past 90 days: 0        Note composed:3:18 PM 09/28/2023           
no

## 2023-09-29 NOTE — TELEPHONE ENCOUNTER
Therapy Activity Session  Performed by [facility] staff     TEP: AcuteTherapy Extention Program, activity plan was established by a licensed therapist and performed under the guidance of a licensed therapist.      Pt seen on 2E nursing unit                                                                                         PT Frequency Frequency Comments: TEP only, recheck 10/4                                                                                  OT Frequency Frequency Comments: DC OT    SLP Frequency      Availability:  Patient available and per RN report, able to particiate.     Tolerance/Participation  Participated and followed direction well during session.  Upon arrival to room patient willing and able to complete TEP ambulation task.  No complaints of pain but some discomfort in the RLE (tense/stretching) while up and moving.  No shortness of breath noted.  Will continue to see patient as able and appropriate for TEP ambulation task until discharge.     SESSION    Activities/Exercises/Mobility completed this session:    Ambulation  Ambulation Equipment: Gait belt, 2-wheeled walker     Distance Recommended: 250  Number of Walks per Session: 1  Ambulation Task Completion: Completed (09/29/23 0900)      Patient ambulated 250 feet in 2E hallway with gait belt and 2-wheeled walker utilized.  No losses of balance/veering/swaying.  Patient appeared very steady and was grateful to get up for a walk.  Patient was pleasant and appeared motivated to stay active.        Physical Therapy Exercises    TEP Follow Up Needed: Yes  Therapy Extender Program Discipline: PT     PT Session Length (min): 18 minutes (09/29/23 0900)                                                                                                 Occupational Therapy Exercises    TEP Follow Up Needed: Yes  Therapy Extender Program Discipline: PT                                                                                                 Ronald Dunbar notified          Speech Therapy Exercises    TEP Follow Up Needed: Yes

## 2023-10-05 ENCOUNTER — DOCUMENT SCAN (OUTPATIENT)
Dept: HOME HEALTH SERVICES | Facility: HOSPITAL | Age: 88
End: 2023-10-05
Payer: MEDICARE

## 2023-10-05 DIAGNOSIS — D63.8 ANEMIA, CHRONIC DISEASE: ICD-10-CM

## 2023-10-06 ENCOUNTER — DOCUMENT SCAN (OUTPATIENT)
Dept: HOME HEALTH SERVICES | Facility: HOSPITAL | Age: 88
End: 2023-10-06
Payer: MEDICARE

## 2023-10-06 RX ORDER — FERROUS GLUCONATE 324(38)MG
1 TABLET ORAL
Qty: 30 TABLET | Refills: 0 | OUTPATIENT
Start: 2023-10-06

## 2023-10-06 NOTE — TELEPHONE ENCOUNTER
Refill Routing Note   Medication(s) are not appropriate for processing by [facility] for the following reason(s):      - NO PCP LISTED IN Epic; ROUTING TO DR OAKES AS LAST PRESCRIBING PROVIDER    [facility] action(s):  Route          Medication reconciliation completed: No     Appointments  past 12m or future 3m with PCP    Date Provider   Last Visit   10/19/2022 Betty Oakes, DO   Next Visit   Visit date not found Betty Oakes, DO   ED visits in past 90 days: 0        Note composed:9:48 PM 10/05/2023

## 2023-10-12 ENCOUNTER — OFFICE VISIT (OUTPATIENT)
Dept: FAMILY MEDICINE | Facility: CLINIC | Age: 88
End: 2023-10-12
Payer: MEDICARE

## 2023-10-12 VITALS
SYSTOLIC BLOOD PRESSURE: 118 MMHG | DIASTOLIC BLOOD PRESSURE: 66 MMHG | HEART RATE: 81 BPM | OXYGEN SATURATION: 97 % | WEIGHT: 97.75 LBS | HEIGHT: 59 IN | BODY MASS INDEX: 19.71 KG/M2

## 2023-10-12 DIAGNOSIS — D69.6 THROMBOCYTOPENIA, UNSPECIFIED: ICD-10-CM

## 2023-10-12 DIAGNOSIS — C50.911 MALIGNANT NEOPLASM OF RIGHT FEMALE BREAST, UNSPECIFIED ESTROGEN RECEPTOR STATUS, UNSPECIFIED SITE OF BREAST: ICD-10-CM

## 2023-10-12 DIAGNOSIS — L97.229 VENOUS STASIS ULCER OF LEFT CALF WITH VARICOSE VEINS, UNSPECIFIED ULCER STAGE: ICD-10-CM

## 2023-10-12 DIAGNOSIS — F03.90 DEMENTIA WITHOUT BEHAVIORAL DISTURBANCE: ICD-10-CM

## 2023-10-12 DIAGNOSIS — I83.022 VENOUS STASIS ULCER OF LEFT CALF WITH VARICOSE VEINS, UNSPECIFIED ULCER STAGE: ICD-10-CM

## 2023-10-12 DIAGNOSIS — I10 ESSENTIAL HYPERTENSION: ICD-10-CM

## 2023-10-12 DIAGNOSIS — I50.42 CHRONIC COMBINED SYSTOLIC AND DIASTOLIC HEART FAILURE: Primary | ICD-10-CM

## 2023-10-12 DIAGNOSIS — R35.0 URINARY FREQUENCY: ICD-10-CM

## 2023-10-12 DIAGNOSIS — D63.8 ANEMIA, CHRONIC DISEASE: ICD-10-CM

## 2023-10-12 DIAGNOSIS — R73.9 HYPERGLYCEMIA: ICD-10-CM

## 2023-10-12 PROCEDURE — 1125F AMNT PAIN NOTED PAIN PRSNT: CPT | Mod: HCNC,CPTII,S$GLB, | Performed by: FAMILY MEDICINE

## 2023-10-12 PROCEDURE — 1159F MED LIST DOCD IN RCRD: CPT | Mod: HCNC,CPTII,S$GLB, | Performed by: FAMILY MEDICINE

## 2023-10-12 PROCEDURE — 1101F PR PT FALLS ASSESS DOC 0-1 FALLS W/OUT INJ PAST YR: ICD-10-PCS | Mod: HCNC,CPTII,S$GLB, | Performed by: FAMILY MEDICINE

## 2023-10-12 PROCEDURE — 1160F RVW MEDS BY RX/DR IN RCRD: CPT | Mod: HCNC,CPTII,S$GLB, | Performed by: FAMILY MEDICINE

## 2023-10-12 PROCEDURE — 90694 FLU VACCINE - QUADRIVALENT - ADJUVANTED: ICD-10-PCS | Mod: HCNC,S$GLB,, | Performed by: FAMILY MEDICINE

## 2023-10-12 PROCEDURE — 3288F FALL RISK ASSESSMENT DOCD: CPT | Mod: HCNC,CPTII,S$GLB, | Performed by: FAMILY MEDICINE

## 2023-10-12 PROCEDURE — 1101F PT FALLS ASSESS-DOCD LE1/YR: CPT | Mod: HCNC,CPTII,S$GLB, | Performed by: FAMILY MEDICINE

## 2023-10-12 PROCEDURE — G0008 FLU VACCINE - QUADRIVALENT - ADJUVANTED: ICD-10-PCS | Mod: HCNC,S$GLB,, | Performed by: FAMILY MEDICINE

## 2023-10-12 PROCEDURE — 99999 PR PBB SHADOW E&M-EST. PATIENT-LVL V: CPT | Mod: PBBFAC,HCNC,, | Performed by: FAMILY MEDICINE

## 2023-10-12 PROCEDURE — 90694 VACC AIIV4 NO PRSRV 0.5ML IM: CPT | Mod: HCNC,S$GLB,, | Performed by: FAMILY MEDICINE

## 2023-10-12 PROCEDURE — 99214 OFFICE O/P EST MOD 30 MIN: CPT | Mod: HCNC,S$GLB,, | Performed by: FAMILY MEDICINE

## 2023-10-12 PROCEDURE — 99214 PR OFFICE/OUTPT VISIT, EST, LEVL IV, 30-39 MIN: ICD-10-PCS | Mod: HCNC,S$GLB,, | Performed by: FAMILY MEDICINE

## 2023-10-12 PROCEDURE — 1160F PR REVIEW ALL MEDS BY PRESCRIBER/CLIN PHARMACIST DOCUMENTED: ICD-10-PCS | Mod: HCNC,CPTII,S$GLB, | Performed by: FAMILY MEDICINE

## 2023-10-12 PROCEDURE — 1157F ADVNC CARE PLAN IN RCRD: CPT | Mod: HCNC,CPTII,S$GLB, | Performed by: FAMILY MEDICINE

## 2023-10-12 PROCEDURE — 1159F PR MEDICATION LIST DOCUMENTED IN MEDICAL RECORD: ICD-10-PCS | Mod: HCNC,CPTII,S$GLB, | Performed by: FAMILY MEDICINE

## 2023-10-12 PROCEDURE — 99999 PR PBB SHADOW E&M-EST. PATIENT-LVL V: ICD-10-PCS | Mod: PBBFAC,HCNC,, | Performed by: FAMILY MEDICINE

## 2023-10-12 PROCEDURE — 1157F PR ADVANCE CARE PLAN OR EQUIV PRESENT IN MEDICAL RECORD: ICD-10-PCS | Mod: HCNC,CPTII,S$GLB, | Performed by: FAMILY MEDICINE

## 2023-10-12 PROCEDURE — 1125F PR PAIN SEVERITY QUANTIFIED, PAIN PRESENT: ICD-10-PCS | Mod: HCNC,CPTII,S$GLB, | Performed by: FAMILY MEDICINE

## 2023-10-12 PROCEDURE — 3288F PR FALLS RISK ASSESSMENT DOCUMENTED: ICD-10-PCS | Mod: HCNC,CPTII,S$GLB, | Performed by: FAMILY MEDICINE

## 2023-10-12 PROCEDURE — G0008 ADMIN INFLUENZA VIRUS VAC: HCPCS | Mod: HCNC,S$GLB,, | Performed by: FAMILY MEDICINE

## 2023-10-12 NOTE — PROGRESS NOTES
PATIENT VISIT FAMILY MEDICINE    CC:   Chief Complaint   Patient presents with    \[facility]\"" Care    Hypertension       HPI: Gloria Zuleta  is a 89 y.o. female:    Patient is new to me.  Patient presents with family member/caretaker for routine follow up on chronic conditions.    Resident of [facility].     Has lesion/wound of right breast. Denies pain. Known breast cancer for which treatment was declined by patient and family after consideration of risks.     Notes urinary frequency/incontinence which has gotten worse over time    PMHX:   Past Medical History:   Diagnosis Date    Breast CA     right    CAD (coronary artery disease)     CHF (congestive heart failure)     Dementia     Depression 10/18/2018    Hyperlipidemia     Hypertension     Labial abscess     Lymphoma     creceived chemo treatment    Osteoarthritis        PSHX:   Past Surgical History:   Procedure Laterality Date    ABSCESS DRAINAGE      labia    COLONOSCOPY      dialtion      urethral dialtion    ESOPHAGOGASTRODUODENOSCOPY      HYSTERECTOMY      total abdominal    ALEXANDRU    NECK EXPLORATION      exploratory lap & Bx of  RIGHT neck    UPPER GASTROINTESTINAL ENDOSCOPY         FAMHX:   Family History   Problem Relation Age of Onset    No Known Problems Mother     No Known Problems Father        SOCHX:   Social History     Socioeconomic History    Marital status:     Number of children: 1   Tobacco Use    Smoking status: Never     Passive exposure: Never    Smokeless tobacco: Never   Substance and Sexual Activity    Alcohol use: No    Drug use: No    Sexual activity: Not Currently   Social History Narrative    Living at [facility]. Confined to a wheelchair. Daughter in law and son help care for her. 1 son (Manjinder) is mPOA. 4 grandchildren and 6 great-grandchildren.  Sacral decubital ulcer since about 2016       ALLERGIES:   Review of patient's allergies indicates:   Allergen Reactions    Bactrim [sulfamethoxazole-trimethoprim]     Codeine      Cortisone     Keflex [cephalexin]     Pcn [penicillins]     Sulfadiazine     Sumycin 250     Contrast media Other (See Comments)     unknown    Donnatal [phenobarb-hyoscy-atropine-scop] Other (See Comments)     Unknown     Restoril [temazepam] Other (See Comments)     unknown       MEDS:   Current Outpatient Medications:     acetaminophen (TYLENOL) 325 MG tablet, Take 1 tablet (325 mg total) by mouth every 6 (six) hours as needed for Pain., Disp: 30 tablet, Rfl: 11    artificial tears (ISOPTO TEARS) 0.5 % ophthalmic solution, Place 1 drop into both eyes as needed (dry eyes)., Disp: 15 mL, Rfl: 5    aspirin (ECOTRIN) 81 MG EC tablet, TAKE 1 TABLET BY MOUTH ONCE DAILY., Disp: 30 tablet, Rfl: 0    carvediloL (COREG) 3.125 MG tablet, TAKE 1 TABLET BY MOUTH TWICE A DAY WITH MEALS., Disp: 60 tablet, Rfl: 0    ferrous gluconate (FERGON) 324 MG tablet, TAKE 1 TABLET BY MOUTH ONCE DAILY WITH BREAKFAST., Disp: 90 tablet, Rfl: 3    folic acid (FOLVITE) 1 MG tablet, TAKE 1 TABLET BY MOUTH ONCE DAILY., Disp: 30 tablet, Rfl: 11    lisinopriL (PRINIVIL,ZESTRIL) 2.5 MG tablet, TAKE 1 TABLET BY MOUTH ONCE DAILY., Disp: 90 tablet, Rfl: 2    melatonin (MELATIN) 3 mg tablet, Take 2 tablets (6 mg total) by mouth nightly as needed for Insomnia., Disp: 60 tablet, Rfl: 5    polyethylene glycol (GLYCOLAX) 17 gram PwPk, Take 17 g by mouth once daily. Hold if diarrhea develops, Disp: 60 each, Rfl: 5    furosemide (LASIX) 20 MG tablet, TAKE 1 TABLET BY MOUTH ONCE DAILY. (Patient not taking: Reported on 10/12/2023), Disp: 30 tablet, Rfl: 0    menthol-zinc oxide (CALMOSEPTINE) 0.44-20.6 % Oint, Apply to aviva wound with dressing changes. (Patient not taking: Reported on 10/12/2023), Disp: 71 g, Rfl: 1    nitroGLYCERIN (NITROSTAT) 0.4 MG SL tablet, Place 1 tablet (0.4 mg total) under the tongue every 5 (five) minutes as needed for Chest pain. Max 3. Call 911 (Patient not taking: Reported on 10/12/2023), Disp: 15 tablet, Rfl: 0    olopatadine  "(PATANOL) 0.1 % ophthalmic solution, Place 1 drop into both eyes 2 (two) times daily. (Patient not taking: Reported on 10/12/2023), Disp: 5 mL, Rfl: 5    pantoprazole (PROTONIX) 20 MG tablet, Take 1 tablet (20 mg total) by mouth daily as needed (heartburn). (Patient not taking: Reported on 10/12/2023), Disp: 30 tablet, Rfl: 5    potassium chloride SA (K-DUR,KLOR-CON) 20 MEQ tablet, TAKE 1 TABLET BY MOUTH ONCE DAILY. (Patient not taking: Reported on 10/12/2023), Disp: 30 tablet, Rfl: 0    RESTASIS 0.05 % ophthalmic emulsion, Place 1 drop into both eyes 2 (two) times a day. (Patient not taking: Reported on 10/12/2023), Disp: 60 each, Rfl: 5    OBJECTIVE:   Vitals:    10/12/23 1408   BP: 118/66   BP Location: Left arm   Patient Position: Sitting   BP Method: Large (Manual)   Pulse: 81   SpO2: 97%   Weight: 44.3 kg (97 lb 12.4 oz)   Height: 4' 11" (1.499 m)     Body mass index is 19.75 kg/m².      Physical Exam  Vitals and nursing note reviewed.   Constitutional:       Appearance: Normal appearance.   HENT:      Head: Normocephalic.   Eyes:      General:         Right eye: No discharge.         Left eye: No discharge.      Extraocular Movements: Extraocular movements intact.   Cardiovascular:      Rate and Rhythm: Normal rate and regular rhythm.   Pulmonary:      Effort: Pulmonary effort is normal.      Breath sounds: Normal breath sounds.   Skin:     Comments: Right breast mass/induration with exophytic growth. No erythema, no drainage.    Neurological:      Mental Status: She is alert.   Psychiatric:         Behavior: Behavior normal.           LABS:   A1C:  Recent Labs   Lab 04/18/21  1547   Hemoglobin A1C 5.0     CBC:  Recent Labs   Lab 10/12/23  1509   WBC 8.08   RBC 4.20   Hemoglobin 12.7   Hematocrit 39.3   Platelets 171   MCV 94   MCH 30.2   MCHC 32.3     CMP:  Recent Labs   Lab 10/12/23  1509   Glucose 167 H   Calcium 8.9   Albumin 3.3 L   Total Protein 6.5   Sodium 139   Potassium 4.2   CO2 24   Chloride 104 "   BUN 20 H   Creatinine 0.91   Alkaline Phosphatase 92   ALT 16   AST 33   Total Bilirubin 0.4     LIPIDS:  Recent Labs   Lab 04/18/21  1011   TSH 0.768   HDL 33 L   Cholesterol 102 L   Triglycerides 115   LDL Cholesterol 46.0 L   HDL/Cholesterol Ratio 32.4   Non-HDL Cholesterol 69   Total Cholesterol/HDL Ratio 3.1     TSH:  Recent Labs   Lab 04/18/21  1011   TSH 0.768         ASSESSMENT & PLAN:    Problem List Items Addressed This Visit          Neuro    Dementia without behavioral disturbance (Chronic)    Overview     - supportive care  - son Manjinder Loera  - DNR  Resident at [facility]            Cardiac/Vascular    Chronic combined systolic and diastolic heart failure - Primary (Chronic)    Overview     - 01/19/2021 echo:  EF 20-25%.  Severe left ventricular global hypokinesis.  Grade 1 left diastolic dysfunction  - medical management  - no signs of acute decompensation  - followed by cardiology         Relevant Orders    Comprehensive Metabolic Panel (Completed)    Essential hypertension (Chronic)    Overview     - well controlled  - continue current medicationd            Hematology    Thrombocytopenia, unspecified (Chronic)    Overview     Due for labs. Will continue to monitor              Oncology    Anemia, chronic disease (Chronic)    Overview     Due for labs. 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 Not painful. 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 Followed by wound care          Other Visit Diagnoses       Hyperglycemia    - on prior CMP    Relevant Orders    Hemoglobin A1C    Urinary frequency    - worsening per family. Will rule out infection      Relevant Orders    Urinalysis    Urine culture              Follow up in about 1 year (around 10/12/2024) for annual.      RTC/ED precautions discussed where applicable.   Encouraged patient to let me know if there are any further questions/concerns.     Advise patient/caretaker to check with insurance regarding orders to avoid unexpected fees/costs.     The patient/caretaker indicates understanding of these issues and agrees with the plan.    Dr. Elsa Puentes MD  Family Medicine

## 2023-10-16 ENCOUNTER — PATIENT MESSAGE (OUTPATIENT)
Dept: FAMILY MEDICINE | Facility: CLINIC | Age: 88
End: 2023-10-16
Payer: MEDICARE

## 2023-10-17 ENCOUNTER — TELEPHONE (OUTPATIENT)
Dept: FAMILY MEDICINE | Facility: CLINIC | Age: 88
End: 2023-10-17
Payer: MEDICARE

## 2023-10-17 DIAGNOSIS — A49.9 INFECTION DUE TO BACTERIA RESISTANT TO MULTIPLE ANTIMICROBIAL DRUGS: Primary | ICD-10-CM

## 2023-10-17 DIAGNOSIS — Z16.35 INFECTION DUE TO BACTERIA RESISTANT TO MULTIPLE ANTIMICROBIAL DRUGS: Primary | ICD-10-CM

## 2023-10-17 RX ORDER — GRANULES FOR ORAL 3 G/1
3 POWDER ORAL ONCE
Qty: 3 G | Refills: 0 | Status: SHIPPED | OUTPATIENT
Start: 2023-10-17 | End: 2023-10-17

## 2023-10-17 NOTE — TELEPHONE ENCOUNTER
Spoke with Nurse Lynch again at [facility] and informed her to disregard the keflex due to allergies and informed her a rx for fosfomycin for 1 dose and hopefully this will be covered by insurance.  Nurse understood verbally.

## 2023-10-17 NOTE — TELEPHONE ENCOUNTER
----- Message from Elsa Puentes MD sent at 10/17/2023  3:24 PM CDT -----  Correction: she is allergic to multiple antibiotics. I will send fosfomycin for 1 dose and hopefully this will be covered by insurance

## 2023-10-17 NOTE — TELEPHONE ENCOUNTER
----- Message from Elsa Puentes MD sent at 10/17/2023  3:21 PM CDT -----  Final urine culture shows UTI. Please let jesica machado know I have sent keflex for patient to start for this.

## 2023-10-17 NOTE — TELEPHONE ENCOUNTER
Spoke with Cris at [facility] to inform her about pts urine results and that rx Keflex was sent to her pharmacy to start taking. Nurse understood verbally.

## 2023-10-18 ENCOUNTER — TELEPHONE (OUTPATIENT)
Dept: FAMILY MEDICINE | Facility: CLINIC | Age: 88
End: 2023-10-18
Payer: MEDICARE

## 2023-10-18 NOTE — TELEPHONE ENCOUNTER
----- Message from Maddy Still sent at 10/17/2023  4:33 PM CDT -----  Type:  Test Results    Who Called: pt's daughter in law Helga  Name of Test (Lab/Mammo/Etc): urine culture  Date of Test: 10/13/23  Ordering Provider:   Where the test was performed: [facility]  Would the patient rather a call back or a response via [facility]? call  Best Call Back Number: [phone]  Additional Information:  is f/u on phone and portal msgs

## 2023-10-23 ENCOUNTER — HOSPITAL ENCOUNTER (OUTPATIENT)
Dept: WOUND CARE | Facility: HOSPITAL | Age: 88
Discharge: HOME | End: 2023-10-23
Attending: PREVENTIVE MEDICINE
Payer: MEDICARE

## 2023-10-23 VITALS
HEIGHT: 59 IN | HEART RATE: 73 BPM | WEIGHT: 97 LBS | TEMPERATURE: 96 F | SYSTOLIC BLOOD PRESSURE: 126 MMHG | DIASTOLIC BLOOD PRESSURE: 67 MMHG | BODY MASS INDEX: 19.56 KG/M2

## 2023-10-23 DIAGNOSIS — M86.68 CHRONIC OSTEOMYELITIS OF SACRUM: ICD-10-CM

## 2023-10-23 DIAGNOSIS — R53.81 DEBILITY: ICD-10-CM

## 2023-10-23 DIAGNOSIS — L89.154 PRESSURE INJURY OF SACRAL REGION, STAGE 4: Primary | ICD-10-CM

## 2023-10-23 PROCEDURE — 99213 OFFICE O/P EST LOW 20 MIN: CPT | Mod: HCNC

## 2023-10-23 NOTE — PROGRESS NOTES
Wound Care & Hyperbaric Medicine Clinic    Subjective:       Patient ID: Gloria Zuleta is a 89 y.o. female.    Chief Complaint: Pressure Ulcer    Patient seen for non-healing sacral wound.  Some improvement noted.  Continue with current plan of care.  Orders routed to home health.    Review of Systems   All other systems reviewed and are negative.        Objective:     Vitals:    10/23/23 1003   BP: 126/67   Pulse: 73   Temp: 96.4 °F (35.8 °C)         Physical Exam       Altered Skin Integrity 04/18/21 1550 Coccyx Full thickness tissue loss with exposed bone, tendon, or muscle. Often includes undermining and tunneling. May extend into muscle and/or supporting structures. (Active)   04/18/21 1550   Altered Skin Integrity Present on Admission - Did Patient arrive to the hospital with altered skin?: yes   Side:    Orientation:    Location: Coccyx   Wound Number:    Is this injury device related?:    Primary Wound Type:    Description of Altered Skin Integrity: Full thickness tissue loss with exposed bone, tendon, or muscle. Often includes undermining and tunneling. May extend into muscle and/or supporting structures.   Ankle-Brachial Index:    Pulses:    Removal Indication and Assessment:    Wound Outcome: Palliative   (Retired) Wound Length (cm):    (Retired) Wound Width (cm):    (Retired) Depth (cm):    Wound Description (Comments):    Removal Indications:    Wound Image   10/23/23 1035   Description of Altered Skin Integrity Full thickness tissue loss. Subcutaneous fat may be visible but bone, tendon or muscle are not exposed 10/23/23 1035   Dressing Appearance Moist drainage 10/23/23 1035   Drainage Amount Moderate 10/23/23 1035   Drainage Characteristics/Odor Serous 10/23/23 1035   Appearance Pink 10/23/23 1035   Tissue loss description Full thickness 10/23/23 1035   Red (%), Wound Tissue Color 100 % 10/23/23 1035   Periwound Area Intact;Dry 10/23/23 1035   Wound Edges Defined;Open 10/23/23  1035   Wound Length (cm) 1.8 cm 10/23/23 1035   Wound Width (cm) 1 cm 10/23/23 1035   Wound Depth (cm) 0.6 cm 10/23/23 1035   Wound Volume (cm^3) 1.08 cm^3 10/23/23 1035   Wound Surface Area (cm^2) 1.8 cm^2 10/23/23 1035   Undermining (depth (cm)/location) 1.5@8-1 10/23/23 1035   Care Cleansed with:;Antimicrobial agent;Sterile normal saline 10/23/23 1035   Packing packed with;hydrofiber/alginate 10/23/23 1035   Packing Inserted  1 10/23/23 1035   Packing Removed 1 10/23/23 1035   Dressing Change Due 10/25/23 10/23/23 1035         Assessment/Plan:         ICD-10-CM ICD-9-CM   1. Pressure injury of sacral region, stage 4  L89.154 707.03     707.24   2. Chronic osteomyelitis of sacrum  M86.68 730.18   3. Debility  R53.81 799.3           Tissue pathology and/or culture taken:  [] Yes [x] No   Sharp debridement performed:   [] Yes [x] No   Labs ordered this visit:   [] Yes [x] No   Imaging ordered this visit:   [] Yes [x] No           Orders Placed This Encounter   Procedures    Change dressing     Sacral Wound    Cleanse wound with: Vashe soak x 2 min, rinse with Normal Saline  Lidocaine:PRN  Silver nitrate:PRN  Periwound care:  Betamethasone to periwound redness  Primary dressing: NS moistened hydrofera classic gently pack to wound bed and undermining.  Secondary dressing: bordered dressing  Offloading: Roho Cushion while sitting in wheel chair, Low airloss mattress while lying in bed. Change position  2 hours while lying in bed.    Right lateral leg  Cleanse with NS  Apply Bandaid.    Frequency: Mon, Wed, Fri and PRN.    Home Health: [facility] continue wound care as above on Mondays, Fridays and as needed except when the patient is in clinic.  Please leave supplies for family to change dressing on Wednesdays and as needed.      Follow-up: 3 months with Dr Reyes        Follow up in about 3 months (around 1/23/2024) for Dr Reyes.            This includes face to face time and non-face to face time preparing to  see the patient (eg, review of tests), obtaining and/or reviewing separately obtained history, documenting clinical information in the electronic or other health record, independently interpreting results and communicating results to the patient/family/caregiver, or care coordinator.

## 2023-10-27 DIAGNOSIS — I63.9 CEREBROVASCULAR ACCIDENT (CVA), UNSPECIFIED MECHANISM: ICD-10-CM

## 2023-10-27 DIAGNOSIS — I50.42 CHRONIC COMBINED SYSTOLIC AND DIASTOLIC HEART FAILURE: ICD-10-CM

## 2023-10-27 RX ORDER — FUROSEMIDE 20 MG/1
20 TABLET ORAL
Qty: 90 TABLET | Refills: 3 | Status: ON HOLD | OUTPATIENT
Start: 2023-10-27 | End: 2023-12-26

## 2023-10-27 RX ORDER — POTASSIUM CHLORIDE 20 MEQ/1
20 TABLET, EXTENDED RELEASE ORAL
Qty: 90 TABLET | Refills: 3 | Status: SHIPPED | OUTPATIENT
Start: 2023-10-27

## 2023-10-30 ENCOUNTER — EXTERNAL HOME HEALTH (OUTPATIENT)
Dept: HOME HEALTH SERVICES | Facility: HOSPITAL | Age: 88
End: 2023-10-30
Payer: MEDICARE

## 2023-10-30 DIAGNOSIS — I50.42 CHRONIC COMBINED SYSTOLIC AND DIASTOLIC HEART FAILURE: ICD-10-CM

## 2023-10-31 ENCOUNTER — DOCUMENTATION ONLY (OUTPATIENT)
Dept: WOUND CARE | Facility: HOSPITAL | Age: 88
End: 2023-10-31
Payer: MEDICARE

## 2023-10-31 ENCOUNTER — TELEPHONE (OUTPATIENT)
Dept: WOUND CARE | Facility: HOSPITAL | Age: 88
End: 2023-10-31
Payer: MEDICARE

## 2023-10-31 DIAGNOSIS — M86.68 CHRONIC OSTEOMYELITIS OF SACRUM: Primary | ICD-10-CM

## 2023-10-31 DIAGNOSIS — L89.154 PRESSURE INJURY OF SACRAL REGION, STAGE 4: ICD-10-CM

## 2023-10-31 RX ORDER — ASPIRIN 81 MG/1
TABLET ORAL
Qty: 30 TABLET | Refills: 11 | Status: SHIPPED | OUTPATIENT
Start: 2023-10-31

## 2023-10-31 NOTE — TELEPHONE ENCOUNTER
No care due was identified.  [facility] Embedded Care Due Messages. Reference number: [id number].   10/30/2023 6:05:10 PM CDT  
Refill Routing Note   Medication(s) are not appropriate for processing by [facility] for the following reason(s):      No active prescription written by provider    [facility] action(s):  Defer Care Due:  None identified            Appointments  past 12m or future 3m with PCP    Date Provider   Last Visit   10/12/2023 Elsa Puentes MD   Next Visit   4/10/2024 Elsa Puentes MD   ED visits in past 90 days: 0        Note composed:9:37 AM 10/31/2023          
72

## 2023-10-31 NOTE — TELEPHONE ENCOUNTER
10/31/23 Received call from [facility] stating that [employer] requests decrease frequency of visits.  Md notified.  New orders routed to home health.

## 2023-11-01 RX ORDER — CARVEDILOL 3.12 MG/1
TABLET ORAL
Qty: 180 TABLET | Refills: 3 | Status: SHIPPED | OUTPATIENT
Start: 2023-11-01

## 2023-11-15 ENCOUNTER — DOCUMENT SCAN (OUTPATIENT)
Dept: HOME HEALTH SERVICES | Facility: HOSPITAL | Age: 88
End: 2023-11-15
Payer: MEDICARE

## 2023-11-16 ENCOUNTER — DOCUMENT SCAN (OUTPATIENT)
Dept: HOME HEALTH SERVICES | Facility: HOSPITAL | Age: 88
End: 2023-11-16
Payer: MEDICARE

## 2023-11-17 ENCOUNTER — EXTERNAL HOME HEALTH (OUTPATIENT)
Dept: HOME HEALTH SERVICES | Facility: HOSPITAL | Age: 88
End: 2023-11-17
Payer: MEDICARE

## 2023-12-04 ENCOUNTER — TELEPHONE (OUTPATIENT)
Dept: FAMILY MEDICINE | Facility: CLINIC | Age: 88
End: 2023-12-04
Payer: MEDICARE

## 2023-12-04 NOTE — TELEPHONE ENCOUNTER
----- Message from Cherie Washington sent at 12/4/2023  1:19 PM CST -----  Type:  Needs Medical Advice    Who Called: Nidhi with HH   Symptoms (please be specific): general fatigue, hot, cough , slight wheezing   How long has patient had these symptoms:  2 days     Would the patient rather a call back or a response via [facility]? Call   Best Call Back Number:   Additional Information:

## 2023-12-04 NOTE — TELEPHONE ENCOUNTER
----- Message from Lay Fritz sent at 12/4/2023  9:13 AM CST -----  Type:  Needs Medical Advice    Who Called: pt   Symptoms (please be specific):  trouble breathing /ongoing cough /swelling with legs   How long has patient had these symptoms:  n/a    Would the patient rather a call back or a response via [facility]? call  Best Call Back Number: [phone] ask for June   Additional Information: pt daughter in law is requesting to get a return call to discuss the symptoms that the pt is having

## 2023-12-06 ENCOUNTER — OFFICE VISIT (OUTPATIENT)
Dept: FAMILY MEDICINE | Facility: CLINIC | Age: 88
End: 2023-12-06
Payer: MEDICARE

## 2023-12-06 VITALS
SYSTOLIC BLOOD PRESSURE: 116 MMHG | TEMPERATURE: 98 F | DIASTOLIC BLOOD PRESSURE: 64 MMHG | OXYGEN SATURATION: 92 % | HEART RATE: 78 BPM

## 2023-12-06 DIAGNOSIS — R05.9 COUGH, UNSPECIFIED TYPE: Primary | ICD-10-CM

## 2023-12-06 DIAGNOSIS — I50.42 CHRONIC COMBINED SYSTOLIC AND DIASTOLIC HEART FAILURE: Chronic | ICD-10-CM

## 2023-12-06 DIAGNOSIS — J18.9 PNEUMONIA DUE TO INFECTIOUS ORGANISM, UNSPECIFIED LATERALITY, UNSPECIFIED PART OF LUNG: ICD-10-CM

## 2023-12-06 DIAGNOSIS — H61.23 BILATERAL IMPACTED CERUMEN: ICD-10-CM

## 2023-12-06 DIAGNOSIS — R53.83 FATIGUE, UNSPECIFIED TYPE: ICD-10-CM

## 2023-12-06 LAB
CTP QC/QA: YES
FLUAV AG NPH QL: NEGATIVE
FLUBV AG NPH QL: NEGATIVE
S PYO RRNA THROAT QL PROBE: NEGATIVE
SARS-COV-2 RDRP RESP QL NAA+PROBE: NEGATIVE

## 2023-12-06 PROCEDURE — 1160F PR REVIEW ALL MEDS BY PRESCRIBER/CLIN PHARMACIST DOCUMENTED: ICD-10-PCS | Mod: HCNC,CPTII,S$GLB,

## 2023-12-06 PROCEDURE — 1126F AMNT PAIN NOTED NONE PRSNT: CPT | Mod: HCNC,CPTII,S$GLB,

## 2023-12-06 PROCEDURE — 1160F RVW MEDS BY RX/DR IN RCRD: CPT | Mod: HCNC,CPTII,S$GLB,

## 2023-12-06 PROCEDURE — 1157F PR ADVANCE CARE PLAN OR EQUIV PRESENT IN MEDICAL RECORD: ICD-10-PCS | Mod: HCNC,CPTII,S$GLB,

## 2023-12-06 PROCEDURE — 99999 PR PBB SHADOW E&M-EST. PATIENT-LVL V: ICD-10-PCS | Mod: PBBFAC,HCNC,,

## 2023-12-06 PROCEDURE — 1157F ADVNC CARE PLAN IN RCRD: CPT | Mod: HCNC,CPTII,S$GLB,

## 2023-12-06 PROCEDURE — 99214 PR OFFICE/OUTPT VISIT, EST, LEVL IV, 30-39 MIN: ICD-10-PCS | Mod: HCNC,S$GLB,,

## 2023-12-06 PROCEDURE — 87804 INFLUENZA ASSAY W/OPTIC: CPT | Mod: QW,HCNC,S$GLB,

## 2023-12-06 PROCEDURE — 87880 POCT RAPID STREP A: ICD-10-PCS | Mod: QW,HCNC,S$GLB,

## 2023-12-06 PROCEDURE — 87880 STREP A ASSAY W/OPTIC: CPT | Mod: QW,HCNC,S$GLB,

## 2023-12-06 PROCEDURE — 1101F PR PT FALLS ASSESS DOC 0-1 FALLS W/OUT INJ PAST YR: ICD-10-PCS | Mod: HCNC,CPTII,S$GLB,

## 2023-12-06 PROCEDURE — 1101F PT FALLS ASSESS-DOCD LE1/YR: CPT | Mod: HCNC,CPTII,S$GLB,

## 2023-12-06 PROCEDURE — 87635 SARS-COV-2 COVID-19 AMP PRB: CPT | Mod: QW,HCNC,S$GLB,

## 2023-12-06 PROCEDURE — 87635: ICD-10-PCS | Mod: QW,HCNC,S$GLB,

## 2023-12-06 PROCEDURE — 3288F FALL RISK ASSESSMENT DOCD: CPT | Mod: HCNC,CPTII,S$GLB,

## 2023-12-06 PROCEDURE — 1126F PR PAIN SEVERITY QUANTIFIED, NO PAIN PRESENT: ICD-10-PCS | Mod: HCNC,CPTII,S$GLB,

## 2023-12-06 PROCEDURE — 87804 POCT INFLUENZA A/B: ICD-10-PCS | Mod: QW,HCNC,S$GLB,

## 2023-12-06 PROCEDURE — 99214 OFFICE O/P EST MOD 30 MIN: CPT | Mod: HCNC,S$GLB,,

## 2023-12-06 PROCEDURE — 1159F MED LIST DOCD IN RCRD: CPT | Mod: HCNC,CPTII,S$GLB,

## 2023-12-06 PROCEDURE — 1159F PR MEDICATION LIST DOCUMENTED IN MEDICAL RECORD: ICD-10-PCS | Mod: HCNC,CPTII,S$GLB,

## 2023-12-06 PROCEDURE — 99999 PR PBB SHADOW E&M-EST. PATIENT-LVL V: CPT | Mod: PBBFAC,HCNC,,

## 2023-12-06 PROCEDURE — 3288F PR FALLS RISK ASSESSMENT DOCUMENTED: ICD-10-PCS | Mod: HCNC,CPTII,S$GLB,

## 2023-12-06 RX ORDER — ATORVASTATIN CALCIUM 40 MG/1
TABLET, FILM COATED ORAL
COMMUNITY

## 2023-12-06 RX ORDER — ASCORBIC ACID 500 MG
500 TABLET ORAL 2 TIMES DAILY
COMMUNITY

## 2023-12-06 NOTE — PATIENT INSTRUCTIONS
Flu, strep, COVID negative.     Have chest xray done today. Will message you on the portal with the results and go from there as far as the treatment plan goes.     Elevate legs when sitting.    Use debrox for ear wax as directed on the bottle. You can get generic.     If anything worsens, please go to the ER.

## 2023-12-06 NOTE — PROGRESS NOTES
Subjective:            Chief Complaint: Cough (Started Friday ), Wheezing (Right lung stated by HH nurse ), and Fatigue     Gloria Zuleta is a 89 y.o. female, patient of Elsa Puentes MD with dementia, hypertension, CHF, hld, incontinence, malignant neoplasm of breast, GERD, unknown to me, presents today with complaints of Cough (Started Friday ), Wheezing (Right lung stated by HH nurse ), and Fatigue  Presents today with her daughter in law. Currently resides at [facility]. Per daughter in law, patient has been having a cough and fatigue. Home health nurse reported wheezing. Symptoms started on Friday. O2 sats were low.  Reports Normal O2 level at home 90-98 is the norm at home.     Followed by Dr. Jaime with Cardiology.   Denies sick contacts; however, reports a few people tested positive for RSV.     URI   This is a new problem. The current episode started in the past 7 days (symptoms started on this past Friday). The problem has been unchanged. There has been no fever. Associated symptoms include coughing and wheezing. Pertinent negatives include no abdominal pain, chest pain, congestion, headaches, joint pain, nausea, rhinorrhea, sinus pain, sneezing, sore throat or vomiting. She has tried nothing for the symptoms.       Past Medical History:   Diagnosis Date    Breast CA     right    CAD (coronary artery disease)     CHF (congestive heart failure)     Dementia     Depression 10/18/2018    Hyperlipidemia     Hypertension     Labial abscess     Lymphoma     creceived chemo treatment    Osteoarthritis        Past Surgical History:   Procedure Laterality Date    ABSCESS DRAINAGE      labia    COLONOSCOPY      dialtion      urethral dialtion    ESOPHAGOGASTRODUODENOSCOPY      HYSTERECTOMY      total abdominal    ALEXANDRU    NECK EXPLORATION      exploratory lap & Bx of  RIGHT neck    UPPER GASTROINTESTINAL ENDOSCOPY         Family History   Problem Relation Age of Onset    No Known Problems Mother     No  Known Problems Father        Social History     Socioeconomic History    Marital status:     Number of children: 1   Tobacco Use    Smoking status: Never     Passive exposure: Never    Smokeless tobacco: Never   Substance and Sexual Activity    Alcohol use: No    Drug use: No    Sexual activity: Not Currently   Social History Narrative    Living at [facility]. Confined to a wheelchair. Daughter in law and son help care for her. 1 son (Manjinder) is mPOA. 4 grandchildren and 6 great-grandchildren.  Sacral decubital ulcer since about 2016       Review of Systems   Constitutional:  Positive for fatigue.   HENT:  Negative for congestion, postnasal drip, rhinorrhea, sinus pain, sneezing and sore throat.    Respiratory:  Positive for cough, shortness of breath and wheezing.    Cardiovascular:  Negative for chest pain.   Gastrointestinal:  Negative for abdominal pain, constipation, nausea and vomiting.   Musculoskeletal:  Negative for joint pain.   Neurological:  Positive for weakness. Negative for headaches.         Objective:     Vitals:    12/06/23 1411 12/06/23 1432   BP: 116/64    Pulse: 78    Temp:  97.5 °F (36.4 °C)   TempSrc:  Oral   SpO2: (!) 88% (!) 92%          Physical Exam  Vitals reviewed.   Constitutional:       Appearance: Normal appearance. She is well-developed and well-groomed.   HENT:      Head: Normocephalic.      Right Ear: There is impacted cerumen.      Left Ear: There is impacted cerumen.      Ears:      Comments: Removed a large chunk of cerumen from the right ear; still with a large amount unable to remove with curette.   Left ear with impacted cerumen, unable to remove. JIL eardrum due to cerumen impaction.      Nose: No congestion or rhinorrhea.      Right Turbinates: Not swollen.      Left Turbinates: Not swollen.      Right Sinus: No maxillary sinus tenderness or frontal sinus tenderness.      Left Sinus: No maxillary sinus tenderness or frontal sinus tenderness.      Mouth/Throat:       "Pharynx: No oropharyngeal exudate or posterior oropharyngeal erythema.   Cardiovascular:      Rate and Rhythm: Normal rate and regular rhythm.      Heart sounds: Normal heart sounds.   Pulmonary:      Effort: Pulmonary effort is normal.      Breath sounds: Rhonchi present.      Comments: Exp wheezes to right.   Musculoskeletal:      Comments: Sitting in wheelchair    Feet:      Comments: Discoloration noted to feet bilaterally   Skin:     General: Skin is warm and dry.      Capillary Refill: Capillary refill takes less than 2 seconds.   Neurological:      Mental Status: She is alert and oriented to person, place, and time.   Psychiatric:         Speech: Speech normal.         Behavior: Behavior is cooperative.           Laboratory:  CBC:  Recent Labs   Lab Result Units 10/12/23  1509   WBC K/uL 8.08   RBC M/uL 4.20   Hemoglobin g/dL 12.7   Hematocrit % 39.3   Platelets K/uL 171   MCV fL 94   MCH pg 30.2   MCHC g/dL 32.3     CMP:  Recent Labs   Lab Result Units 10/12/23  1509   Glucose mg/dL 167*   Calcium mg/dL 8.9   Albumin g/dL 3.3*   Total Protein g/dL 6.5   Sodium mmol/L 139   Potassium mmol/L 4.2   CO2 mmol/L 24   Chloride mmol/L 104   BUN mg/dL 20*   Alkaline Phosphatase U/L 92   ALT U/L 16   AST U/L 33   Total Bilirubin mg/dL 0.4     URINALYSIS:  Recent Labs   Lab Result Units 10/13/23  1002   Color, UA  Other*   Specific Gravity, UA  1.020   pH, UA  7.0   Protein, UA  1+*   Bacteria /hpf Many*   Nitrite, UA  Positive*   Leukocytes, UA  1+*   Urobilinogen, UA EU/dL Negative   Hyaline Casts, UA /lpf 0      LIPIDS:  No results for input(s): "TSH", "HDL", "CHOL", "TRIG", "LDLCALC", "CHOLHDL", "NONHDLCHOL", "TOTALCHOLEST" in the last 2160 hours.  TSH:  No results for input(s): "TSH" in the last 2160 hours.  A1C:  Recent Labs   Lab Result Units 10/12/23  1509   Hemoglobin A1C % 4.9     X-Ray Chest PA And Lateral  Order: [id number]  Status: Final result       Visible to patient: Yes (seen)       Next appt: " 01/22/2024 at 10:00 AM in Wound Care (Rylan Reyes MD)       Dx: Chronic combined systolic and diastol...    0 Result Notes  Details    Reading Physician Reading Date Result Priority   Yon Quintero MD  [phone]  [phone] 12/6/2023 Routine     Narrative & Impression  EXAMINATION:  XR CHEST PA AND LATERAL     CLINICAL HISTORY:  Cough, unspecified     TECHNIQUE:  PA and lateral views of the chest were performed.     COMPARISON:  10/18/2021     FINDINGS:  Cardiomegaly, remote anteromedial dislocation chronic DJD change left shoulder joint stable.  Mild flattening hemidiaphragms, increased AP diameter chest, COPD, minimal mild kyphosis mid dorsal spine.     New interstitial patchy infiltrates, both upper lobes, basilar segment right lower lobe, left lower lobe most prominent anterior segment with new obscuring of left cardiac margin and left hemidiaphragm.  No consolidation or pleural reaction.  Mild moderate dextroscoliosis mid dorsal spine.  No new hilar or mediastinal mass.  Visualized pulmonary vascular tree remains intact.     Impression:     Multifocal pneumonitis superimposed on chronic lung disease particularly right upper lobe, basilar segments both lower lobes and anterior segment left upper lobe.  Inflammatory, infectious etiologies favored and clinical correlation requested.        Electronically signed by: Yon Quintero MD  Date:                                            12/06/2023  Time:                                           15:54     Assessment:         ICD-10-CM ICD-9-CM   1. Cough, unspecified type  R05.9 786.2   2. Fatigue, unspecified type  R53.83 780.79   3. Chronic combined systolic and diastolic heart failure  I50.42 428.42   4. Bilateral impacted cerumen  H61.23 380.4   5. Pneumonia due to infectious organism, unspecified laterality, unspecified part of lung  J18.9 486       Plan:       Cough, unspecified type  Fatigue, unspecified type  -     POCT Influenza A/B  -      POCT COVID-19 Rapid Screening  -     POCT Rapid Strep A  -     X-Ray Chest PA And Lateral; Future; Expected date: 12/06/2023  COVID, Flu, Strep negative.     Chronic combined systolic and diastolic heart failure  -     X-Ray Chest PA And Lateral; Future; Expected date: 12/06/2023  Xray does not show fluid on lung.  Continue with medication as prescribed. Encouraged to f/u with Dr. Jaime.     Bilateral impacted cerumen  OTC Debrox as directed on bottle. Ok to use generic.   RTC for ear flushing prn.     Pneumonia due to infectious organism, unspecified laterality, unspecified part of lung  -     levoFLOXacin (LEVAQUIN) 750 MG tablet; Take 1 tablet (750 mg total) by mouth once daily. for 5 days  Dispense: 5 tablet; Refill: 0  Chest xray shows the following:  FINDINGS:  Cardiomegaly, remote anteromedial dislocation chronic DJD change left shoulder joint stable.  Mild flattening hemidiaphragms, increased AP diameter chest, COPD, minimal mild kyphosis mid dorsal spine.     New interstitial patchy infiltrates, both upper lobes, basilar segment right lower lobe, left lower lobe most prominent anterior segment with new obscuring of left cardiac margin and left hemidiaphragm.  No consolidation or pleural reaction.  Mild moderate dextroscoliosis mid dorsal spine.  No new hilar or mediastinal mass.  Visualized pulmonary vascular tree remains intact.     Impression:     Multifocal pneumonitis superimposed on chronic lung disease particularly right upper lobe, basilar segments both lower lobes and anterior segment left upper lobe.  Inflammatory, infectious etiologies favored and clinical correlation requested.  CURB-65=1 pt. Will treat for pneumonia OP, patient PCN allergic, levofloxacin x 5 days.   Will have patient to f/u in 1 week or sooner if needed.     Strict ED precautions discussed with patient and daughter in law. If symptoms worsen, go to ER.  If symptoms do not improve, return to clinic.   Keep appointments with all  specialists.     Patient and daughter in law verbalizes understanding and agrees with current treatment plan.      Follow up if symptoms worsen or fail to improve.     Patient's Medications   New Prescriptions    LEVOFLOXACIN (LEVAQUIN) 750 MG TABLET    Take 1 tablet (750 mg total) by mouth once daily. for 5 days   Previous Medications    ACETAMINOPHEN (TYLENOL) 325 MG TABLET    Take 1 tablet (325 mg total) by mouth every 6 (six) hours as needed for Pain.    ARTIFICIAL TEARS (ISOPTO TEARS) 0.5 % OPHTHALMIC SOLUTION    Place 1 drop into both eyes as needed (dry eyes).    ASCORBIC ACID, VITAMIN C, (VITAMIN C) 500 MG TABLET    Take 500 mg by mouth 2 (two) times daily.    ASPIRIN (ECOTRIN) 81 MG EC TABLET    TAKE 1 TABLET BY MOUTH ONCE DAILY.    ATORVASTATIN (LIPITOR) 40 MG TABLET        CARVEDILOL (COREG) 3.125 MG TABLET    TAKE 1 TABLET BY MOUTH TWICE A DAY WITH MEALS.    FERROUS GLUCONATE (FERGON) 324 MG TABLET    TAKE 1 TABLET BY MOUTH ONCE DAILY WITH BREAKFAST.    FOLIC ACID (FOLVITE) 1 MG TABLET    TAKE 1 TABLET BY MOUTH ONCE DAILY.    FUROSEMIDE (LASIX) 20 MG TABLET    TAKE 1 TABLET BY MOUTH ONCE DAILY.    LISINOPRIL (PRINIVIL,ZESTRIL) 2.5 MG TABLET    TAKE 1 TABLET BY MOUTH ONCE DAILY.    MELATONIN (MELATIN) 3 MG TABLET    Take 2 tablets (6 mg total) by mouth nightly as needed for Insomnia.    MENTHOL-ZINC OXIDE (CALMOSEPTINE) 0.44-20.6 % OINT    Apply to aviva wound with dressing changes.    NITROGLYCERIN (NITROSTAT) 0.4 MG SL TABLET    Place 1 tablet (0.4 mg total) under the tongue every 5 (five) minutes as needed for Chest pain. Max 3. Call 911    OLOPATADINE (PATANOL) 0.1 % OPHTHALMIC SOLUTION    Place 1 drop into both eyes 2 (two) times daily.    PANTOPRAZOLE (PROTONIX) 20 MG TABLET    Take 1 tablet (20 mg total) by mouth daily as needed (heartburn).    POLYETHYLENE GLYCOL (GLYCOLAX) 17 GRAM PWPK    Take 17 g by mouth once daily. Hold if diarrhea develops    POTASSIUM CHLORIDE SA (K-DUR,KLOR-CON) 20 MEQ  TABLET    TAKE 1 TABLET BY MOUTH ONCE DAILY.    RESTASIS 0.05 % OPHTHALMIC EMULSION    Place 1 drop into both eyes 2 (two) times a day.   Modified Medications    No medications on file   Discontinued Medications    No medications on file         Zully Becker NP

## 2023-12-07 ENCOUNTER — TELEPHONE (OUTPATIENT)
Dept: FAMILY MEDICINE | Facility: CLINIC | Age: 88
End: 2023-12-07
Payer: MEDICARE

## 2023-12-07 RX ORDER — LEVOFLOXACIN 750 MG/1
750 TABLET ORAL DAILY
Qty: 5 TABLET | Refills: 0 | Status: SHIPPED | OUTPATIENT
Start: 2023-12-07 | End: 2023-12-12

## 2023-12-07 NOTE — TELEPHONE ENCOUNTER
S/w daughter in law Helga, advised per CXR, patient has infection. Will treat for pneumonia with Levaquin daily x 5 days. Advised message was sent via portal. Advised would like to f/u with her in 1 week. Appt scheduled. Instructed to go to ED if symptoms worsen.     Helga verbalizes understanding and agrees with current treatment plan.

## 2023-12-12 ENCOUNTER — TELEPHONE (OUTPATIENT)
Dept: FAMILY MEDICINE | Facility: CLINIC | Age: 88
End: 2023-12-12
Payer: MEDICARE

## 2023-12-12 NOTE — TELEPHONE ENCOUNTER
----- Message from Greta May sent at 12/12/2023  8:46 AM CST -----  Type:  Test Results    Who Called:  Pt Daughter June  Name of Test (Lab/Mammo/Etc):  Blood work  Date of Test:  10/13/23  Ordering Provider:  Deloris  Where the test was performed:  [facility]  Would the patient rather a call back or a response via [facility]?  call  Best Call Back Number:   [phone]  Additional Information:   Pt is requesting for test results to be sent to Dr. Jaime in [facility]. Test results need to be sent today before 11:30.

## 2023-12-12 NOTE — TELEPHONE ENCOUNTER
----- Message from Greta May sent at 12/12/2023  8:46 AM CST -----  Type:  Test Results    Who Called:  Pt Daughter June  Name of Test (Lab/Mammo/Etc):  Blood work  Date of Test:  10/13/23  Ordering Provider:  Deloris  Where the test was performed:  [facility]  Would the patient rather a call back or a response via [facility]?  call  Best Call Back Number:   [phone]  Additional Information:   Pt is requesting for test results to be sent to Dr. Jaime in [facility]. Test results need to be sent today before 11:30.

## 2023-12-13 ENCOUNTER — OFFICE VISIT (OUTPATIENT)
Dept: FAMILY MEDICINE | Facility: CLINIC | Age: 88
End: 2023-12-13
Payer: MEDICARE

## 2023-12-13 VITALS
HEIGHT: 59 IN | SYSTOLIC BLOOD PRESSURE: 100 MMHG | DIASTOLIC BLOOD PRESSURE: 58 MMHG | BODY MASS INDEX: 19.56 KG/M2 | OXYGEN SATURATION: 97 % | HEART RATE: 68 BPM | WEIGHT: 97 LBS

## 2023-12-13 DIAGNOSIS — R05.9 COUGH, UNSPECIFIED TYPE: ICD-10-CM

## 2023-12-13 DIAGNOSIS — F03.90 DEMENTIA WITHOUT BEHAVIORAL DISTURBANCE: Chronic | ICD-10-CM

## 2023-12-13 DIAGNOSIS — I50.42 CHRONIC COMBINED SYSTOLIC AND DIASTOLIC HEART FAILURE: Chronic | ICD-10-CM

## 2023-12-13 DIAGNOSIS — H61.23 BILATERAL IMPACTED CERUMEN: ICD-10-CM

## 2023-12-13 DIAGNOSIS — R53.83 FATIGUE, UNSPECIFIED TYPE: ICD-10-CM

## 2023-12-13 DIAGNOSIS — C50.911 MALIGNANT NEOPLASM OF RIGHT FEMALE BREAST, UNSPECIFIED ESTROGEN RECEPTOR STATUS, UNSPECIFIED SITE OF BREAST: Chronic | ICD-10-CM

## 2023-12-13 DIAGNOSIS — J18.9 PNEUMONIA DUE TO INFECTIOUS ORGANISM, UNSPECIFIED LATERALITY, UNSPECIFIED PART OF LUNG: Primary | ICD-10-CM

## 2023-12-13 DIAGNOSIS — I10 ESSENTIAL HYPERTENSION: Chronic | ICD-10-CM

## 2023-12-13 PROCEDURE — 99214 PR OFFICE/OUTPT VISIT, EST, LEVL IV, 30-39 MIN: ICD-10-PCS | Mod: HCNC,S$GLB,,

## 2023-12-13 PROCEDURE — 1101F PR PT FALLS ASSESS DOC 0-1 FALLS W/OUT INJ PAST YR: ICD-10-PCS | Mod: HCNC,CPTII,S$GLB,

## 2023-12-13 PROCEDURE — 99999 PR PBB SHADOW E&M-EST. PATIENT-LVL III: ICD-10-PCS | Mod: PBBFAC,HCNC,,

## 2023-12-13 PROCEDURE — 1157F ADVNC CARE PLAN IN RCRD: CPT | Mod: HCNC,CPTII,S$GLB,

## 2023-12-13 PROCEDURE — 3288F PR FALLS RISK ASSESSMENT DOCUMENTED: ICD-10-PCS | Mod: HCNC,CPTII,S$GLB,

## 2023-12-13 PROCEDURE — 1157F PR ADVANCE CARE PLAN OR EQUIV PRESENT IN MEDICAL RECORD: ICD-10-PCS | Mod: HCNC,CPTII,S$GLB,

## 2023-12-13 PROCEDURE — 99999 PR PBB SHADOW E&M-EST. PATIENT-LVL III: CPT | Mod: PBBFAC,HCNC,,

## 2023-12-13 PROCEDURE — 3288F FALL RISK ASSESSMENT DOCD: CPT | Mod: HCNC,CPTII,S$GLB,

## 2023-12-13 PROCEDURE — 1101F PT FALLS ASSESS-DOCD LE1/YR: CPT | Mod: HCNC,CPTII,S$GLB,

## 2023-12-13 PROCEDURE — 99214 OFFICE O/P EST MOD 30 MIN: CPT | Mod: HCNC,S$GLB,,

## 2023-12-13 RX ORDER — ALBUTEROL SULFATE 90 UG/1
AEROSOL, METERED RESPIRATORY (INHALATION)
COMMUNITY
Start: 2023-12-13

## 2023-12-21 ENCOUNTER — DOCUMENT SCAN (OUTPATIENT)
Dept: HOME HEALTH SERVICES | Facility: HOSPITAL | Age: 88
End: 2023-12-21
Payer: MEDICARE

## 2023-12-23 PROBLEM — J96.01 ACUTE HYPOXEMIC RESPIRATORY FAILURE: Status: ACTIVE | Noted: 2023-12-23

## 2023-12-26 ENCOUNTER — TELEPHONE (OUTPATIENT)
Dept: FAMILY MEDICINE | Facility: CLINIC | Age: 88
End: 2023-12-26
Payer: MEDICARE

## 2023-12-26 NOTE — TELEPHONE ENCOUNTER
Spoke with pt caregiver  Informed pt caregiver I am calling to schedule hosp f.u. pt caregiver asked to hold off scheduling due to restrictions with patient. Stating they are seeing a specialist in regards to strengthening patient and will schedule with primary after

## 2023-12-26 NOTE — TELEPHONE ENCOUNTER
----- Message from Linh Shah LMSW sent at 12/26/2023 11:52 AM CST -----  Regarding: Hospital follow up appointment  Good morning, patient is discharging back to [facility] today.  Please contact patient with a hospital follow up appointment.    Thank you,

## 2023-12-27 ENCOUNTER — TELEPHONE (OUTPATIENT)
Dept: HEMATOLOGY/ONCOLOGY | Facility: CLINIC | Age: 88
End: 2023-12-27
Payer: MEDICARE

## 2023-12-27 NOTE — TELEPHONE ENCOUNTER
Spoke with the pt daughter about her mother n law appointment. She heike let me know if that day works for her

## 2023-12-28 ENCOUNTER — TELEPHONE (OUTPATIENT)
Dept: FAMILY MEDICINE | Facility: CLINIC | Age: 88
End: 2023-12-28
Payer: MEDICARE

## 2023-12-28 DIAGNOSIS — I50.42 CHRONIC COMBINED SYSTOLIC AND DIASTOLIC HEART FAILURE: Chronic | ICD-10-CM

## 2023-12-28 DIAGNOSIS — J96.01 ACUTE HYPOXEMIC RESPIRATORY FAILURE: Primary | ICD-10-CM

## 2023-12-28 NOTE — TELEPHONE ENCOUNTER
----- Message from Elisha Winslow sent at 12/28/2023  7:21 AM CST -----  Contact: Karli  Type:  Needs Medical Advice    Who Called:Karli/ pt's daughter in law  Would the patient rather a call back or a response via [facility]? call  Best Call Back Number: [phone]  Additional Information:   Karli requesting a call regarding pt was discharged from the ED and wasn't giving the right oxygen. Pt need an oxygen tank that can go on her wheel chair (may need a order)   Karli been trying get in contact with office all week

## 2024-01-02 ENCOUNTER — TELEPHONE (OUTPATIENT)
Dept: PALLIATIVE MEDICINE | Facility: CLINIC | Age: 89
End: 2024-01-02
Payer: MEDICARE

## 2024-01-02 ENCOUNTER — OFFICE VISIT (OUTPATIENT)
Dept: PALLIATIVE MEDICINE | Facility: CLINIC | Age: 89
End: 2024-01-02
Payer: MEDICARE

## 2024-01-02 DIAGNOSIS — I50.9 ACUTE ON CHRONIC CONGESTIVE HEART FAILURE, UNSPECIFIED HEART FAILURE TYPE: ICD-10-CM

## 2024-01-02 DIAGNOSIS — I50.42 CHRONIC COMBINED SYSTOLIC AND DIASTOLIC HEART FAILURE: Chronic | ICD-10-CM

## 2024-01-02 DIAGNOSIS — J96.01 ACUTE HYPOXEMIC RESPIRATORY FAILURE: ICD-10-CM

## 2024-01-02 PROCEDURE — 1123F ACP DISCUSS/DSCN MKR DOCD: CPT | Mod: HCNC,CPTII,95,

## 2024-01-02 PROCEDURE — 99205 OFFICE O/P NEW HI 60 MIN: CPT | Mod: HCNC,95,,

## 2024-01-02 NOTE — PROGRESS NOTES
Subjective:       Patient ID: Gloria Zuleta is a 89 y.o. female.    Chief Complaint: No chief complaint on file.    Patient is a 89-year-old female with a known PMHx of HTN, HLD, CAD, CHF, and GERD that presented to the ER with SOB and leg swelling.x 1 week.      Patient is resident at [facility] and noticed to have worsened shortness of breath and leg swelling over the last week.  On arrival, patient in moderate respiratory distress improved with supplemental oxygen.  Chest x-ray shows bilateral consolidation concerning for pulmonary edema.   Patient recently visited with her cardiologist and multiple medications were discontinued including her diuretic.   Patient admitted observation for further evaluation and management of acute CHF and acute respiratory failure with hypoxia.Pt was feeling better and diuresing well with oral lasix. Not able to wean off of O 2 and so home O 2 arranged. Out patient f/u with Palliative care also arranged. Patient is now being d/rashad back to Assisted living in a stable condition with increased dose lasix.          Virtual visit with pt and pts Daughter in law June who provides the majority of the pts care and visits daily.  She repots that since pts hospital admission about 3 weeks ago, the pt has continued to decline overall. Pt expresses feeling not weak, just tired- nothing hurts, no complaints- endorses tiredness multiple times throughout visit.   Pt recently established a new PCP. Pt also follows with wound care for a sacral wound that has been present for 7 years.  Pt is newly on home O2- with sats ranging from 92-96% with 2L, family trying to obtain portable tank- orders placed by primary on 12/28/2023. Family removes O2 at times- sats stay mid 90's- suggest wean to 1L and see how pt tolerates- goal to keep sats >92%     Pt does have a LaPOST linked to her EMR- she wishes to be a DNR.  Pt also has a MOPA and living will on file, both of which still represent the pts  wishes.     Overall goals of care discussed. Family would like to continue with modestly aggressive interventions at this time- allow some time to see If pt will return to baseline or continue to decline.      They report that pt is eating well, still feeds herself, is now requiring assistance to bathroom- PT/OT on hold right now due to pt fatigue. Pt is still able to participates in activities offered including music and art.     Trial wean of 02- sats 92-96% on 2 L    Taxing to get to appointments, virtual right now     Will plan to touch base with pt in 1  month.  Did discuss hospice as an option - family not ready at this time- wants to see how pt does over the next few weeks and if improvement is shown will stay the course, if pt continues to decline family will revisit topic of hospice. Questions addressed at this time.         Review of Systems   Constitutional:  Positive for activity change and fatigue. Negative for appetite change and fever.   Cardiovascular:  Positive for leg swelling.   Neurological:  Positive for weakness.         Objective:      Physical Exam  Vitals reviewed: virtual.   Constitutional:       General: She is sleeping.      Appearance: She is well-groomed. She is ill-appearing. She is not toxic-appearing.      Interventions: Nasal cannula in place.   HENT:      Head: Normocephalic.   Pulmonary:      Effort: Pulmonary effort is normal.   Musculoskeletal:      Right lower leg: No edema.      Left lower leg: No edema.   Skin:     Coloration: Skin is pale.   Neurological:      Mental Status: She is easily aroused.   Psychiatric:         Behavior: Behavior is cooperative.        Review of Symptoms      Symptom Assessment (ESAS 0-10 Scale)  Pain:  0  Dyspnea:  0  Anxiety:  0  Nausea:  0  Depression:  0  Anorexia:  0  Fatigue:  7  Insomnia:  0  Restlessness:  0  Agitation:  0         Performance Status:  40    Living Arrangements:  Lives in assisted living    Psychosocial/Cultural:   See  Palliative Psychosocial Note: No  Social Issues Identified: Coping deficit pt/family  Bereavement Risk: No  Caregiver Needs Discussed. Caregiver Distress: Yes: Issues of guilt, Intensity of family caregiving, and Caregiver Burnout Risk  Cultural: none identified at this time   **Primary  to Follow**  Palliative Care  Consult: No    Spiritual:  F - Yari and Belief:  Sikhism   I - Importance:  Moderate   C - Community:  Family support      Time-Based Charting:  Yes  Chart Review: 12 minutes  Face to Face: 36 minutes  Symptom Assessment: 7 minutes  Coordination of Care: 5 minutes  Discharge Plannin minutes  Advance Care Planning: 10 minutes  Goals of Care: 7 minutes    Total Time Spent: 77 minutes      Advance Care Planning   Advance Directives:   Living Will: Yes        Copy on chart: Yes    LaPOST: Yes    Do Not Resuscitate Status: Yes    Medical Power of : Yes      Decision Making:  Patient answered questions and Family answered questions  Goals of Care: What is most important right now is to focus on remaining as independent as possible. Accordingly, we have decided that the best plan to meet the patient's goals includes continuing with treatment.       Assessment:       1. Acute on chronic congestive heart failure, unspecified heart failure type    2. Acute hypoxemic respiratory failure    3. Chronic combined systolic and diastolic heart failure        Plan:     Diagnoses and all orders for this visit:    Acute on chronic congestive heart failure, unspecified heart failure type  -     Ambulatory referral/consult to CLINIC Palliative Care    Acute hypoxemic respiratory failure  -     Ambulatory referral/consult to CLINIC Palliative Care        - attempt to wean O2, maintain sats >92 %    Chronic combined systolic and diastolic heart failure  -     Ambulatory referral/consult to CLINIC Palliative Care        -DNR status        -Living will and MPOA on file      RTC in 1 month      Cierra Conner NP  Palliative Medicine

## 2024-01-02 NOTE — TELEPHONE ENCOUNTER
Spoke with the pt daughter about Cierra giving her a phone call because, pt can't get portal to work

## 2024-01-09 ENCOUNTER — TELEPHONE (OUTPATIENT)
Dept: PALLIATIVE MEDICINE | Facility: CLINIC | Age: 89
End: 2024-01-09
Payer: MEDICARE

## 2024-01-09 ENCOUNTER — DOCUMENT SCAN (OUTPATIENT)
Dept: HOME HEALTH SERVICES | Facility: HOSPITAL | Age: 89
End: 2024-01-09
Payer: MEDICARE

## 2024-01-09 NOTE — TELEPHONE ENCOUNTER
Spoke with the pt daughter about getting her mom 02 level lowered but she needs a order from the provider in order to move it to 1. The nurse told her that she can't move it without orders. The message was sent to the NP for advise.

## 2024-01-09 NOTE — TELEPHONE ENCOUNTER
----- Message from Kristopher Camejo sent at 1/9/2024  9:13 AM CST -----  Contact: Pt. FOSTER Gonsalez  .Type:  Needs Medical Advice    Who Called: pt. Daughter  in law Gonsalez  Would the patient rather a call back or a response via [facility]?  Call back  Best Call Back Number: [phone]   Additional Information:  Pt. daughter in law Gonsalez is calling regarding reducing the oxygen level that she was instructed to do by the provider.  The faculity will not allow her to reduce the oxygen level with out an order from the provider.

## 2024-01-10 ENCOUNTER — TELEPHONE (OUTPATIENT)
Dept: PALLIATIVE MEDICINE | Facility: CLINIC | Age: 89
End: 2024-01-10
Payer: MEDICARE

## 2024-01-10 NOTE — TELEPHONE ENCOUNTER
Left VM for the pt about the doctor faxing over orders to the nursing home about he sister oxygen letty reduced. I sent the message over to the palliative doctors on 1/10/24

## 2024-01-10 NOTE — TELEPHONE ENCOUNTER
----- Message from Elisha Winslow sent at 1/10/2024 12:27 PM CST -----  Type:  Needs Medical Advice    Who Called: pt's daughter in law Helga    Would the patient rather a call back or a response via [facility]? call  Best Call Back Number: [phone]  Additional Information:   June requesting a call  Aimee regarding status on paperwork [facility] in [place] to reduce oxygen to 1 liter

## 2024-01-11 ENCOUNTER — TELEPHONE (OUTPATIENT)
Dept: PALLIATIVE MEDICINE | Facility: CLINIC | Age: 89
End: 2024-01-11
Payer: MEDICARE

## 2024-01-11 NOTE — TELEPHONE ENCOUNTER
Spoke with the pt sister, ben is calling about the reduction on her sister 02. I told her I will reach out to the NP about what will happen.

## 2024-01-11 NOTE — TELEPHONE ENCOUNTER
Spoke with the pt sister , about her 02 being lowered. The message was sent to Dr. Reyes  who is listed the as the pt primary in he chart the message was also sent to his nurse so that the 02 can be lowered.

## 2024-01-12 DIAGNOSIS — L89.154 PRESSURE INJURY OF SACRAL REGION, STAGE 4: Primary | ICD-10-CM

## 2024-01-12 DIAGNOSIS — R53.81 DEBILITY: ICD-10-CM

## 2024-01-22 ENCOUNTER — TELEPHONE (OUTPATIENT)
Dept: WOUND CARE | Facility: HOSPITAL | Age: 89
End: 2024-01-22

## 2024-01-22 ENCOUNTER — TELEPHONE (OUTPATIENT)
Dept: FAMILY MEDICINE | Facility: CLINIC | Age: 89
End: 2024-01-22
Payer: MEDICARE

## 2024-01-22 ENCOUNTER — HOSPITAL ENCOUNTER (OUTPATIENT)
Dept: WOUND CARE | Facility: HOSPITAL | Age: 89
Discharge: HOME | End: 2024-01-22
Attending: PREVENTIVE MEDICINE
Payer: MEDICARE

## 2024-01-22 VITALS
SYSTOLIC BLOOD PRESSURE: 100 MMHG | DIASTOLIC BLOOD PRESSURE: 54 MMHG | HEART RATE: 71 BPM | WEIGHT: 103 LBS | HEIGHT: 59 IN | BODY MASS INDEX: 20.76 KG/M2 | TEMPERATURE: 98 F | RESPIRATION RATE: 18 BRPM

## 2024-01-22 DIAGNOSIS — I83.022 VENOUS STASIS ULCER OF LEFT CALF WITH VARICOSE VEINS, UNSPECIFIED ULCER STAGE: Primary | ICD-10-CM

## 2024-01-22 DIAGNOSIS — R53.81 DEBILITY: ICD-10-CM

## 2024-01-22 DIAGNOSIS — L97.229 VENOUS STASIS ULCER OF LEFT CALF WITH VARICOSE VEINS, UNSPECIFIED ULCER STAGE: Primary | ICD-10-CM

## 2024-01-22 DIAGNOSIS — L89.154 PRESSURE ULCER OF SACRAL REGION, STAGE 4: ICD-10-CM

## 2024-01-22 DIAGNOSIS — M86.68 CHRONIC OSTEOMYELITIS OF SACRUM: ICD-10-CM

## 2024-01-22 DIAGNOSIS — L89.154 PRESSURE INJURY OF SACRAL REGION, STAGE 4: Primary | ICD-10-CM

## 2024-01-22 PROCEDURE — 99213 OFFICE O/P EST LOW 20 MIN: CPT | Mod: HCNC

## 2024-01-22 NOTE — TELEPHONE ENCOUNTER
----- Message from Ozzy Ferguson sent at 1/22/2024  1:20 PM CST -----  Type:  Needs Medical Advice    Who Called: Rickey With medical Team    Would the patient rather a call back or a response via [facility]? call  Best Call Back Number: [phone]  Additional Information: Requesting New Orders For Pt to have a new home health company please call regarding

## 2024-01-22 NOTE — PROGRESS NOTES
[facility]    Subjective:       Patient ID: Gloria Zuleta is a 89 y.o. female.    Chief Complaint: Pressure Ulcer    Wound care follow up for stage 4 pressure ulcer to sacrum. Wound bed remains clean, no signs of infection. Family and home health assisting with dressing changes at assisted living facility. Continue plan of care.     Review of Systems   All other systems reviewed and are negative.        Objective:     Vitals:    01/22/24 1028   BP: (!) 100/54   Pulse: 71   Resp: 18   Temp: 97.6 °F (36.4 °C)         Physical Exam       Altered Skin Integrity 04/18/21 1550 Coccyx Full thickness tissue loss with exposed bone, tendon, or muscle. Often includes undermining and tunneling. May extend into muscle and/or supporting structures. (Active)   04/18/21 1550   Altered Skin Integrity Present on Admission - Did Patient arrive to the hospital with altered skin?: yes   Side:    Orientation:    Location: Coccyx   Wound Number:    Is this injury device related?:    Primary Wound Type:    Description of Altered Skin Integrity: Full thickness tissue loss with exposed bone, tendon, or muscle. Often includes undermining and tunneling. May extend into muscle and/or supporting structures.   Ankle-Brachial Index:    Pulses:    Removal Indication and Assessment:    Wound Outcome: Palliative   (Retired) Wound Length (cm):    (Retired) Wound Width (cm):    (Retired) Depth (cm):    Wound Description (Comments):    Removal Indications:    Wound Image   01/22/24 1042   Description of Altered Skin Integrity Full thickness tissue loss. Subcutaneous fat may be visible but bone, tendon or muscle are not exposed 01/22/24 1042   Dressing Appearance Moist drainage 01/22/24 1042   Drainage Amount Moderate 01/22/24 1042   Drainage Characteristics/Odor Serosanguineous 01/22/24 1042   Appearance Red;Smooth 01/22/24 1042   Red (%), Wound Tissue Color 100 % 01/22/24 1042   Periwound Area Intact  01/22/24 1042   Wound Edges Defined;Rolled/closed 01/22/24 1042   Wound Length (cm) 2 cm 01/22/24 1042   Wound Width (cm) 1.3 cm 01/22/24 1042   Wound Depth (cm) 0.6 cm 01/22/24 1042   Wound Volume (cm^3) 1.56 cm^3 01/22/24 1042   Wound Surface Area (cm^2) 2.6 cm^2 01/22/24 1042   Undermining (depth (cm)/location) 12-12:00 - 1.5cm 01/22/24 1042   Care Cleansed with:;Sterile normal saline 01/22/24 1042   Packing packed with 01/22/24 1042   Packing Inserted  1 01/22/24 1042   Packing Removed 1 01/22/24 1042   Periwound Care Absorptive dressing applied 01/22/24 1042   Dressing Change Due 01/24/24 01/22/24 1042         Assessment/Plan:         ICD-10-CM ICD-9-CM   1. Pressure injury of sacral region, stage 4  L89.154 707.03     707.24   2. Debility  R53.81 799.3   3. Chronic osteomyelitis of sacrum  M86.68 730.18       Wound is improving and healing well. Exposed bone is covered with granulation tissue.    Tissue pathology and/or culture taken:  [] Yes [x] No   Sharp debridement performed:   [] Yes [x] No   Labs ordered this visit:   [] Yes [x] No   Imaging ordered this visit:   [] Yes [x] No           Orders Placed This Encounter   Procedures    Change dressing     Sacral Wound     Cleanse wound with: Vashe soak x 2 min, rinse with Normal Saline   Lidocaine:PRN   Silver nitrate:PRN   Periwound care:  Betamethasone to periwound redness prn  Primary dressing: NS moistened hydrofera classic gently pack to wound bed and undermining.   Secondary dressing: bordered dressing   Offloading: Roho Cushion while sitting in wheel chair, Low airloss mattress while lying in bed. Change position 2 hours while lying in bed.     Frequency: Monday, Wednesday, Friday and prn    Home Health: [facility] to provide skilled wound care as above weekly.  Please leave supplies for family to change dressing on other days.      Other orders: At night please keep head of bed at 30 degrees.     Follow-up: 3 months with Dr Amy Clayton  up in about 3 months (around 4/22/2024) for .            This includes face to face time and non-face to face time preparing to see the patient (eg, review of tests), obtaining and/or reviewing separately obtained history, documenting clinical information in the electronic or other health record, independently interpreting results and communicating results to the patient/family/caregiver, or care coordinator.

## 2024-01-23 ENCOUNTER — TELEPHONE (OUTPATIENT)
Dept: FAMILY MEDICINE | Facility: CLINIC | Age: 89
End: 2024-01-23
Payer: MEDICARE

## 2024-01-23 NOTE — TELEPHONE ENCOUNTER
----- Message from Mellissa Wan sent at 1/23/2024 11:40 AM CST -----  .Type:  Needs Medical Advice    Who Called: Rickey with [facility] in [place]    Would the patient rather a call back or a response via [facility]? Call back  Best Call Back Number: [phone]  Additional Information:     Rickey stated he would like a call back as soon as possible regarding home health for pt

## 2024-01-26 DIAGNOSIS — I83.012 VENOUS STASIS ULCER OF RIGHT CALF WITH VARICOSE VEINS, UNSPECIFIED ULCER STAGE: ICD-10-CM

## 2024-01-26 DIAGNOSIS — I83.022 VENOUS STASIS ULCER OF LEFT CALF WITH VARICOSE VEINS, UNSPECIFIED ULCER STAGE: Chronic | ICD-10-CM

## 2024-01-26 DIAGNOSIS — L89.154 PRESSURE INJURY OF SACRAL REGION, STAGE 4: Primary | ICD-10-CM

## 2024-01-26 DIAGNOSIS — L97.219 VENOUS STASIS ULCER OF RIGHT CALF WITH VARICOSE VEINS, UNSPECIFIED ULCER STAGE: ICD-10-CM

## 2024-01-26 DIAGNOSIS — L97.229 VENOUS STASIS ULCER OF LEFT CALF WITH VARICOSE VEINS, UNSPECIFIED ULCER STAGE: Chronic | ICD-10-CM

## 2024-01-26 NOTE — PROGRESS NOTES
Spoke with Rickey at [facility] [employer], the company who was providing wound care to patient at [facility]; Rickey is requesting a referral for a new home health company as they no longer have a nurse who can provide services to this patient. Referral placed to [facility] [employer]. Informed Rickey, will place referral and someone should reach out to the patient. Wound care needed as early as next week.

## 2024-01-29 ENCOUNTER — TELEPHONE (OUTPATIENT)
Dept: WOUND CARE | Facility: HOSPITAL | Age: 89
End: 2024-01-29
Payer: MEDICARE

## 2024-01-29 NOTE — TELEPHONE ENCOUNTER
1/29/24 Daughter called and requested the home health company to be changed to [facility] because current home health company does not have nurses to be able to accommodate the patient.

## 2024-01-31 ENCOUNTER — EXTERNAL HOME HEALTH (OUTPATIENT)
Dept: HOME HEALTH SERVICES | Facility: HOSPITAL | Age: 89
End: 2024-01-31
Payer: MEDICARE

## 2024-01-31 ENCOUNTER — TELEPHONE (OUTPATIENT)
Dept: WOUND CARE | Facility: HOSPITAL | Age: 89
End: 2024-01-31
Payer: MEDICARE

## 2024-01-31 ENCOUNTER — TELEPHONE (OUTPATIENT)
Dept: FAMILY MEDICINE | Facility: CLINIC | Age: 89
End: 2024-01-31
Payer: MEDICARE

## 2024-01-31 NOTE — TELEPHONE ENCOUNTER
"1/31/24 Received a call from patient's family clarifying stage of pressure injury.  Stated that assisted living asking for staging to "possibly change us more".  Educated that according to notes in 2021 bone was exposed which would classify as a stage 4 pressure injury.  Requested to speak with doctor.  "

## 2024-02-01 ENCOUNTER — TELEPHONE (OUTPATIENT)
Dept: FAMILY MEDICINE | Facility: CLINIC | Age: 89
End: 2024-02-01
Payer: MEDICARE

## 2024-02-01 NOTE — TELEPHONE ENCOUNTER
----- Message from Lay Fritz sent at 2/1/2024  1:34 PM CST -----  Type:  Needs Medical Advice    Who Called: [facility]     Would the patient rather a call back or a response via [facility]? Call   Best Call Back Number: [phone] ask for lizbet  Additional Information: caller needs to get wound care orders and a virtual appointment scheduled

## 2024-02-05 DIAGNOSIS — I50.42 CHRONIC COMBINED SYSTOLIC AND DIASTOLIC HEART FAILURE: Chronic | ICD-10-CM

## 2024-02-05 NOTE — TELEPHONE ENCOUNTER
----- Message from Maddy Still sent at 2/5/2024  3:12 PM CST -----  .Type:  RX Refill Request    Who Called:      Disp Refills Start End AP  furosemide (LASIX) 40 MG tablet 60 tablet 2 12/26/2023 3/25/2024 No  Sig - Route: Take 1 tablet (40 mg total) by mouth 2 (two) times a day. - Oral  Sent to pharmacy as: furosemide (LASIX) 40 MG tablet  Class: Normal  Order: [id number]  Date/Time Signed: 12/26/2023 12:59      E-Prescribing Status: Receipt confirmed by pharmacy (12/26/2023  1:01 PM CST)    Pharmacy  [facility] - [place], [place] - [address]   Associated Diagnoses      Chronic combined systolic and diastolic heart failure                Would the patient rather a call back or a response via [email or web address]?   Best Call Back Number:  Additional Information:

## 2024-02-05 NOTE — TELEPHONE ENCOUNTER
No care due was identified.  Health [facility] Embedded Care Due Messages. Reference number: [id number].   2/05/2024 3:43:48 PM CST

## 2024-02-05 NOTE — TELEPHONE ENCOUNTER
----- Message from Maddy Still sent at 2/5/2024  3:12 PM CST -----  .Type:  RX Refill Request    Who Called:      Disp Refills Start End AP  furosemide (LASIX) 40 MG tablet 60 tablet 2 12/26/2023 3/25/2024 No  Sig - Route: Take 1 tablet (40 mg total) by mouth 2 (two) times a day. - Oral  Sent to pharmacy as: furosemide (LASIX) 40 MG tablet  Class: Normal  Order: [id number]  Date/Time Signed: 12/26/2023 12:59      E-Prescribing Status: Receipt confirmed by pharmacy (12/26/2023  1:01 PM CST)    Pharmacy  [facility] - [place], [place] - [address]   Associated Diagnoses      Chronic combined systolic and diastolic heart failure                Would the patient rather a call back or a response via [facility]?   Best Call Back Number:  Additional Information:

## 2024-02-06 RX ORDER — FUROSEMIDE 40 MG/1
40 TABLET ORAL 2 TIMES DAILY
Qty: 60 TABLET | Refills: 2 | Status: SHIPPED | OUTPATIENT
Start: 2024-02-06 | End: 2024-04-17 | Stop reason: DRUGHIGH

## 2024-02-12 ENCOUNTER — TELEPHONE (OUTPATIENT)
Dept: WOUND CARE | Facility: HOSPITAL | Age: 89
End: 2024-02-12
Payer: MEDICARE

## 2024-02-12 NOTE — TELEPHONE ENCOUNTER
"Returned daughter in law Helga's call. Family member inquired what stage patient's sacral wound was on admission to wound care clinic. Explained to family member wound was stage 4 with bone exposed on admission according to documentation.  Last visit 1/22/24 progress notes state wound is a healing stage 4 pressure injury.  Family member states [facility] and [facility] "have questions about the wound, but I'm  not sure what they want."  Requested last visit progress note faxed to assisted living center and states she will ask assisted living and home health exactly what information they need and will call back if they need  more information.  "

## 2024-02-16 ENCOUNTER — DOCUMENT SCAN (OUTPATIENT)
Dept: HOME HEALTH SERVICES | Facility: HOSPITAL | Age: 89
End: 2024-02-16
Payer: MEDICARE

## 2024-02-28 ENCOUNTER — TELEPHONE (OUTPATIENT)
Dept: WOUND CARE | Facility: HOSPITAL | Age: 89
End: 2024-02-28
Payer: MEDICARE

## 2024-02-28 NOTE — TELEPHONE ENCOUNTER
Family member Helga called to clarify sacral aviva wound care if cavilon was still used to aviva wound. Explained Cavilon discontinued per  due to rash and Betamethasone cream started 4/21/23. Last wound care visit orders faxed to [facility].

## 2024-02-29 RX ORDER — FOLIC ACID 1 MG/1
1000 TABLET ORAL DAILY
Qty: 90 TABLET | Refills: 5 | Status: SHIPPED | OUTPATIENT
Start: 2024-02-29

## 2024-02-29 NOTE — TELEPHONE ENCOUNTER
No care due was identified.  Health [facility] Embedded Care Due Messages. Reference number: [id number].   2/29/2024 10:41:17 AM CST

## 2024-03-01 ENCOUNTER — TELEPHONE (OUTPATIENT)
Dept: ADMINISTRATIVE | Facility: CLINIC | Age: 89
End: 2024-03-01
Payer: MEDICARE

## 2024-03-08 ENCOUNTER — DOCUMENT SCAN (OUTPATIENT)
Dept: HOME HEALTH SERVICES | Facility: HOSPITAL | Age: 89
End: 2024-03-08
Payer: MEDICARE

## 2024-03-18 ENCOUNTER — TELEPHONE (OUTPATIENT)
Dept: FAMILY MEDICINE | Facility: CLINIC | Age: 89
End: 2024-03-18
Payer: MEDICARE

## 2024-03-18 NOTE — TELEPHONE ENCOUNTER
[facility]; [place], [facility] -  Phone Number: [phone]     PATIENT RECEIVING  HOME HEALTH SERVICES FOR:RECERT    ASSESSMENT/CURRENT REPORTED PROBLEM/FINDINGS:  IS IT OKAY TO RECERTIFY PATIENT TO HOME HEALTH SERVIES ?   SENT EPIC MESSAGE    REFERRED TO PER DR DR ERIC FENG FOR WOUND CARE

## 2024-03-21 ENCOUNTER — PATIENT MESSAGE (OUTPATIENT)
Dept: ADMINISTRATIVE | Facility: CLINIC | Age: 89
End: 2024-03-21
Payer: MEDICARE

## 2024-03-25 ENCOUNTER — OFFICE VISIT (OUTPATIENT)
Dept: FAMILY MEDICINE | Facility: CLINIC | Age: 89
End: 2024-03-25
Payer: MEDICARE

## 2024-03-25 VITALS — HEIGHT: 59 IN | WEIGHT: 103 LBS | BODY MASS INDEX: 20.76 KG/M2

## 2024-03-25 DIAGNOSIS — M86.68 CHRONIC OSTEOMYELITIS OF SACRUM: ICD-10-CM

## 2024-03-25 DIAGNOSIS — Z00.00 ENCOUNTER FOR PREVENTIVE HEALTH EXAMINATION: Primary | ICD-10-CM

## 2024-03-25 DIAGNOSIS — E78.2 MIXED HYPERLIPIDEMIA: ICD-10-CM

## 2024-03-25 DIAGNOSIS — I87.2 VENOUS STASIS DERMATITIS OF BOTH LOWER EXTREMITIES: ICD-10-CM

## 2024-03-25 DIAGNOSIS — Z99.3 DEPENDENCE ON WHEELCHAIR: ICD-10-CM

## 2024-03-25 DIAGNOSIS — Z86.73 HISTORY OF CVA (CEREBROVASCULAR ACCIDENT) WITHOUT RESIDUAL DEFICITS: ICD-10-CM

## 2024-03-25 DIAGNOSIS — I42.8 NICM (NONISCHEMIC CARDIOMYOPATHY): ICD-10-CM

## 2024-03-25 DIAGNOSIS — R26.9 ABNORMALITY OF GAIT AND MOBILITY: ICD-10-CM

## 2024-03-25 DIAGNOSIS — D69.6 THROMBOCYTOPENIA, UNSPECIFIED: Chronic | ICD-10-CM

## 2024-03-25 DIAGNOSIS — Z85.72 HISTORY OF LYMPHOMA: ICD-10-CM

## 2024-03-25 DIAGNOSIS — K21.9 GASTROESOPHAGEAL REFLUX DISEASE, UNSPECIFIED WHETHER ESOPHAGITIS PRESENT: ICD-10-CM

## 2024-03-25 DIAGNOSIS — I25.10 CAD IN NATIVE ARTERY: ICD-10-CM

## 2024-03-25 DIAGNOSIS — I10 ESSENTIAL HYPERTENSION: Chronic | ICD-10-CM

## 2024-03-25 DIAGNOSIS — R32 URINARY INCONTINENCE, UNSPECIFIED TYPE: ICD-10-CM

## 2024-03-25 DIAGNOSIS — D69.2 PURPURA: ICD-10-CM

## 2024-03-25 DIAGNOSIS — L97.219 VENOUS STASIS ULCER OF RIGHT CALF WITH VARICOSE VEINS, UNSPECIFIED ULCER STAGE: ICD-10-CM

## 2024-03-25 DIAGNOSIS — I70.0 AORTIC CALCIFICATION: ICD-10-CM

## 2024-03-25 DIAGNOSIS — F03.90 DEMENTIA WITHOUT BEHAVIORAL DISTURBANCE: Chronic | ICD-10-CM

## 2024-03-25 DIAGNOSIS — D63.8 ANEMIA, CHRONIC DISEASE: Chronic | ICD-10-CM

## 2024-03-25 DIAGNOSIS — Z99.81 DEPENDENCE ON SUPPLEMENTAL OXYGEN: ICD-10-CM

## 2024-03-25 DIAGNOSIS — C50.911 MALIGNANT NEOPLASM OF RIGHT FEMALE BREAST, UNSPECIFIED ESTROGEN RECEPTOR STATUS, UNSPECIFIED SITE OF BREAST: Chronic | ICD-10-CM

## 2024-03-25 DIAGNOSIS — L97.229 VENOUS STASIS ULCER OF LEFT CALF WITH VARICOSE VEINS, UNSPECIFIED ULCER STAGE: Chronic | ICD-10-CM

## 2024-03-25 DIAGNOSIS — I83.022 VENOUS STASIS ULCER OF LEFT CALF WITH VARICOSE VEINS, UNSPECIFIED ULCER STAGE: Chronic | ICD-10-CM

## 2024-03-25 DIAGNOSIS — I50.42 CHRONIC COMBINED SYSTOLIC AND DIASTOLIC HEART FAILURE: Chronic | ICD-10-CM

## 2024-03-25 DIAGNOSIS — L89.154 PRESSURE INJURY OF SACRAL REGION, STAGE 4: ICD-10-CM

## 2024-03-25 DIAGNOSIS — I83.012 VENOUS STASIS ULCER OF RIGHT CALF WITH VARICOSE VEINS, UNSPECIFIED ULCER STAGE: ICD-10-CM

## 2024-03-25 PROBLEM — J96.01 ACUTE HYPOXEMIC RESPIRATORY FAILURE: Status: RESOLVED | Noted: 2023-12-23 | Resolved: 2024-03-25

## 2024-03-25 PROCEDURE — 1160F RVW MEDS BY RX/DR IN RCRD: CPT | Mod: HCNC,CPTII,95,

## 2024-03-25 PROCEDURE — G0439 PPPS, SUBSEQ VISIT: HCPCS | Mod: HCNC,95,,

## 2024-03-25 PROCEDURE — 1157F ADVNC CARE PLAN IN RCRD: CPT | Mod: HCNC,CPTII,95,

## 2024-03-25 PROCEDURE — 3288F FALL RISK ASSESSMENT DOCD: CPT | Mod: HCNC,CPTII,95,

## 2024-03-25 PROCEDURE — 1170F FXNL STATUS ASSESSED: CPT | Mod: HCNC,CPTII,95,

## 2024-03-25 PROCEDURE — 1159F MED LIST DOCD IN RCRD: CPT | Mod: HCNC,CPTII,95,

## 2024-03-25 PROCEDURE — 1101F PT FALLS ASSESS-DOCD LE1/YR: CPT | Mod: HCNC,CPTII,95,

## 2024-03-25 PROCEDURE — 1126F AMNT PAIN NOTED NONE PRSNT: CPT | Mod: HCNC,CPTII,95,

## 2024-03-25 NOTE — PROGRESS NOTES
"  The patient location is: [place]  The chief complaint leading to consultation is: Medicare AWV    Visit type: audiovisual    Face to Face time with patient: 40  60 minutes of total time spent on the encounter, which includes face to face time and non-face to face time preparing to see the patient (eg, review of tests), Obtaining and/or reviewing separately obtained history, Documenting clinical information in the electronic or other health record, Independently interpreting results (not separately reported) and communicating results to the patient/family/caregiver, or Care coordination (not separately reported).         Each patient to whom he or she provides medical services by telemedicine is:  (1) informed of the relationship between the physician and patient and the respective role of any other health care provider with respect to management of the patient; and (2) notified that he or she may decline to receive medical services by telemedicine and may withdraw from such care at any time.    Notes:       Gloria Zuleta presented for a  Medicare AWV and comprehensive Health Risk Assessment today. Visit was completed with the assistance of patient's daughter in law. The following components were reviewed and updated:    Medical history  Family History  Social history  Allergies and Current Medications  Health Risk Assessment  Health Maintenance  Care Team         ** See Completed Assessments for Annual Wellness Visit within the encounter summary.**         The following assessments were completed:  Living Situation  CAGE  Depression Screening  Fall Risk Assessment (MACH 10)  Hearing Assessment(HHI)  Cognitive Function Screening  Nutrition Screening  ADL Screening  PAQ Screening    Opioid documentation:      Patient does not have a current opioid prescription.        Vitals:    03/25/24 0901   Weight: 46.7 kg (103 lb)   Height: 4' 11" (1.499 m)     Body mass index is 20.8 kg/m².  Physical Exam  Constitutional:  "      General: She is not in acute distress.     Appearance: Normal appearance. She is well-developed and well-groomed.   HENT:      Right Ear: Decreased hearing noted.      Left Ear: Decreased hearing noted.   Musculoskeletal:      Comments: Sitting in wheelchair    Skin:     Coloration: Skin is not pale.   Neurological:      Mental Status: She is alert.   Psychiatric:         Speech: Speech normal.         Behavior: Behavior is cooperative.               Diagnoses and health risks identified today and associated recommendations/orders:    1. Encounter for preventive health examination  Screenings performed, as noted above. Personal preventative testing needs reviewed.     2. Pressure injury of sacral region, stage 4  3. Chronic osteomyelitis of sacrum  4. Venous stasis ulcer of right calf with varicose veins, unspecified ulcer stage  5. Venous stasis ulcer of left calf with varicose veins, unspecified ulcer stage  Chronic. Stable. Followed by Wound Care team, also receives home health.     6. Dementia without behavioral disturbance  Chronic. Stable. Followed by PCP.     7. Chronic combined systolic and diastolic heart failure  8. Aortic calcification  9. NICM (nonischemic cardiomyopathy)  Chronic; stable on medication. Followed by Cardiology.    10. Malignant neoplasm of right female breast, unspecified estrogen receptor status, unspecified site of breast  Chronic. Stable. Followed by PCP.     11. Thrombocytopenia, unspecified  12. Purpura  Chronic. Stable. Followed by PCP.     13. CAD in native artery  14. Essential hypertension  15. Mixed hyperlipidemia  Chronic; stable on medication. Followed by Cardiology.    16. Venous stasis dermatitis of both lower extremities  Chronic; stable. Followed by Cardiology.    17. Urinary incontinence, unspecified type  Currently wears diapers. Encouraged patient to get up and use the bathroom. Discussed bedside commode with daughter in law, who declines at this time. Will notify  us if she wishes to use BSC.     18. Anemia, chronic disease  Chronic. Stable with medication. Followed by PCP.     19. History of lymphoma  Stable. Followed by PCP.     20. Gastroesophageal reflux disease, unspecified whether esophagitis present  Chronic. Stable with medication. Followed by PCP.     21. Dependence on supplemental oxygen  Chronic. Stable. Followed by PCP.     22. Abnormality of gait and mobility  23. History of CVA (cerebrovascular accident) without residual deficits  24. Dependence on wheelchair  Continue to use ambulatory assistive devices. Encouraged activity as tolerated. Followed by PCP.       Provided Gloria with a 5-10 year written screening schedule and personal prevention plan. Recommendations were developed using the USPSTF age appropriate recommendations. Education, counseling, and referrals were provided as needed. After Visit Summary printed and given to patient which includes a list of additional screenings\tests needed.    Follow up in about 1 year (around 3/25/2025) for your next annual wellness visit.    Zully Becker, NP      Advance Care Planning     I offered to discuss advanced care planning, including how to pick a person who would make decisions for you if you were unable to make them for yourself, called a health care power of , and what kind of decisions you might make such as use of life sustaining treatments such as ventilators and tube feeding when faced with a life limiting illness recorded on a living will that they will need to know. (How you want to be cared for as you near the end of your natural life)     X  Patient has advanced directives on file, which we reviewed, and they do not wish to make changes.

## 2024-03-25 NOTE — PATIENT INSTRUCTIONS
Counseling and Referral of Other Preventative  (Italic type indicates deductible and co-insurance are waived)    Patient Name: Gloria Zuleta  Today's Date: 3/25/2024    Health Maintenance       Date Due Completion Date    Shingles Vaccine (1 of 2) 04/30/2024 (Originally 10/26/2017) 8/31/2017    RSV Vaccine (Age 60+ and Pregnant patients) (1 - 1-dose 60+ series) 04/30/2024 (Originally 8/29/1989) ---    COVID-19 Vaccine (5 - 2023-24 season) 03/25/2025 (Originally 9/1/2023) 2/1/2021    Lipid Panel 04/18/2026 4/18/2021    Override on 11/1/2016: Done    TETANUS VACCINE 08/31/2027 8/31/2017        No orders of the defined types were placed in this encounter.    The following information is provided to all patients.  This information is to help you find resources for any of the problems found today that may be affecting your health:                  Living healthy guide: www.[facility].[place].gov      Understanding Diabetes: www.diabetes.org      Eating healthy: www.cdc.gov/healthyweight      CDC home safety checklist: www.cdc.gov/steadi/patient.html      Agency on Aging: www.goea.[place].gov      Alcoholics anonymous (AA): www.aa.org      Physical Activity: www.aubree.nih.gov/rm4ybvq      Tobacco use: www.quitwithusla.org

## 2024-04-02 DIAGNOSIS — I25.10 CAD IN NATIVE ARTERY: ICD-10-CM

## 2024-04-02 RX ORDER — NITROGLYCERIN 0.4 MG/1
0.4 TABLET SUBLINGUAL EVERY 5 MIN PRN
Qty: 15 TABLET | Refills: 3 | Status: SHIPPED | OUTPATIENT
Start: 2024-04-02

## 2024-04-02 NOTE — TELEPHONE ENCOUNTER
No care due was identified.  [facility] Embedded Care Due Messages. Reference number: [id number].   4/02/2024 1:24:58 PM CDT

## 2024-04-10 ENCOUNTER — PATIENT MESSAGE (OUTPATIENT)
Dept: FAMILY MEDICINE | Facility: CLINIC | Age: 89
End: 2024-04-10
Payer: MEDICARE

## 2024-04-17 ENCOUNTER — OFFICE VISIT (OUTPATIENT)
Dept: FAMILY MEDICINE | Facility: CLINIC | Age: 89
End: 2024-04-17
Payer: MEDICARE

## 2024-04-17 VITALS
DIASTOLIC BLOOD PRESSURE: 48 MMHG | WEIGHT: 83 LBS | HEART RATE: 78 BPM | OXYGEN SATURATION: 96 % | BODY MASS INDEX: 16.73 KG/M2 | HEIGHT: 59 IN | SYSTOLIC BLOOD PRESSURE: 82 MMHG

## 2024-04-17 DIAGNOSIS — I50.42 CHRONIC COMBINED SYSTOLIC AND DIASTOLIC HEART FAILURE: Chronic | ICD-10-CM

## 2024-04-17 DIAGNOSIS — C50.911 MALIGNANT NEOPLASM OF RIGHT FEMALE BREAST, UNSPECIFIED ESTROGEN RECEPTOR STATUS, UNSPECIFIED SITE OF BREAST: Chronic | ICD-10-CM

## 2024-04-17 DIAGNOSIS — I10 ESSENTIAL HYPERTENSION: Chronic | ICD-10-CM

## 2024-04-17 DIAGNOSIS — H61.23 BILATERAL IMPACTED CERUMEN: Primary | ICD-10-CM

## 2024-04-17 DIAGNOSIS — D63.8 ANEMIA, CHRONIC DISEASE: Chronic | ICD-10-CM

## 2024-04-17 DIAGNOSIS — D69.6 THROMBOCYTOPENIA, UNSPECIFIED: Chronic | ICD-10-CM

## 2024-04-17 DIAGNOSIS — F03.90 DEMENTIA WITHOUT BEHAVIORAL DISTURBANCE: Chronic | ICD-10-CM

## 2024-04-17 PROCEDURE — 1101F PT FALLS ASSESS-DOCD LE1/YR: CPT | Mod: HCNC,CPTII,S$GLB, | Performed by: FAMILY MEDICINE

## 2024-04-17 PROCEDURE — 1159F MED LIST DOCD IN RCRD: CPT | Mod: HCNC,CPTII,S$GLB, | Performed by: FAMILY MEDICINE

## 2024-04-17 PROCEDURE — 1160F RVW MEDS BY RX/DR IN RCRD: CPT | Mod: HCNC,CPTII,S$GLB, | Performed by: FAMILY MEDICINE

## 2024-04-17 PROCEDURE — 99214 OFFICE O/P EST MOD 30 MIN: CPT | Mod: HCNC,S$GLB,, | Performed by: FAMILY MEDICINE

## 2024-04-17 PROCEDURE — 99999 PR PBB SHADOW E&M-EST. PATIENT-LVL III: CPT | Mod: PBBFAC,HCNC,, | Performed by: FAMILY MEDICINE

## 2024-04-17 PROCEDURE — 3288F FALL RISK ASSESSMENT DOCD: CPT | Mod: HCNC,CPTII,S$GLB, | Performed by: FAMILY MEDICINE

## 2024-04-17 PROCEDURE — 1157F ADVNC CARE PLAN IN RCRD: CPT | Mod: HCNC,CPTII,S$GLB, | Performed by: FAMILY MEDICINE

## 2024-04-17 PROCEDURE — 1126F AMNT PAIN NOTED NONE PRSNT: CPT | Mod: HCNC,CPTII,S$GLB, | Performed by: FAMILY MEDICINE

## 2024-04-17 RX ORDER — ALBUTEROL SULFATE 90 UG/1
1-2 AEROSOL, METERED RESPIRATORY (INHALATION) EVERY 4 HOURS PRN
Qty: 18 G | Refills: 0 | Status: SHIPPED | OUTPATIENT
Start: 2024-04-17

## 2024-04-17 RX ORDER — FUROSEMIDE 40 MG/1
40 TABLET ORAL DAILY
Qty: 90 TABLET | Refills: 3 | Status: ON HOLD | OUTPATIENT
Start: 2024-04-17 | End: 2024-06-16

## 2024-04-17 NOTE — LETTER
April 17, 2024      [facility]  [address]  [address]  [place] [address]  Phone: [phone]  Fax: [phone]       Patient: Gloria Zuleta   YOB: 1929  Date of Visit: 04/17/2024    To Whom It May Concern:    Concerning above patient:     -decrease furosemide to 40mg once in the morning, new rx sent to [facility]  -albuterol can be switched to only as needed, new rx sent to [facility]  -miralax should be once daily ONLY AS NEEDED   -please check blood pressure once daily for next 2 weeks and fax me log    Sincerely,    Elsa Puentes MD

## 2024-04-17 NOTE — PROGRESS NOTES
PATIENT VISIT FAMILY MEDICINE    CC:   Chief Complaint   Patient presents with    Follow-up       HPI: Gloria Zuleta  is a 89 y.o. female:    Patient is known to me.  Patient presents routine follow up on chronic conditions    Patient doing well overall, taking medications as prescribed and with no acute concerns.     PMHX:   Past Medical History:   Diagnosis Date    Breast CA     right    CAD (coronary artery disease)     CHF (congestive heart failure)     Dementia     Depression 10/18/2018    Hyperlipidemia     Hypertension     Labial abscess     Lymphoma     creceived chemo treatment    Osteoarthritis        PSHX:   Past Surgical History:   Procedure Laterality Date    ABSCESS DRAINAGE      labia     SECTION      COLONOSCOPY      dialtion      urethral dialtion    ESOPHAGOGASTRODUODENOSCOPY      HYSTERECTOMY      total abdominal    ALEXANDRU    JOINT REPLACEMENT      NECK EXPLORATION      exploratory lap & Bx of  RIGHT neck    UPPER GASTROINTESTINAL ENDOSCOPY         FAMHX:   Family History   Problem Relation Name Age of Onset    Arthritis Mother ashely     Heart disease Father Elphege     Diabetes Son manjinder     Stroke Son manjinder     Hearing loss Brother destini        SOCHX:   Social History     Socioeconomic History    Marital status:     Number of children: 1   Tobacco Use    Smoking status: Never     Passive exposure: Never    Smokeless tobacco: Never   Substance and Sexual Activity    Alcohol use: No    Drug use: No    Sexual activity: Not Currently     Birth control/protection: None   Social History Narrative    Living at [facility]. Confined to a wheelchair. Daughter in law and son help care for her. 1 son (Manjinder) is mPOA. 4 grandchildren and 6 great-grandchildren.  Sacral decubital ulcer since about 2016     Social Determinants of Health     Financial Resource Strain: Medium Risk (3/20/2024)    Overall Financial Resource Strain (CARDIA)     Difficulty of Paying Living Expenses: Somewhat hard    Food Insecurity: No Food Insecurity (3/20/2024)    Hunger Vital Sign     Worried About Running Out of Food in the Last Year: Never true     Ran Out of Food in the Last Year: Never true   Transportation Needs: No Transportation Needs (3/20/2024)    PRAPARE - Transportation     Lack of Transportation (Medical): No     Lack of Transportation (Non-Medical): No   Physical Activity: Inactive (3/20/2024)    Exercise Vital Sign     Days of Exercise per Week: 0 days     Minutes of Exercise per Session: 0 min   Stress: No Stress Concern Present (3/20/2024)    [place] [employer] - Occupational Stress Questionnaire     Feeling of Stress : Not at all   Social Connections: Unknown (3/20/2024)    Social Connection and Isolation Panel [NHANES]     Frequency of Communication with Friends and Family: Patient declined     Frequency of Social Gatherings with Friends and Family: Patient declined     Active Member of Clubs or Organizations: Yes     Attends Club or Organization Meetings: More than 4 times per year     Marital Status:    Housing Stability: Unknown (3/20/2024)    Housing Stability Vital Sign     Unable to Pay for Housing in the Last Year: Patient declined     Number of Places Lived in the Last Year: 1     Unstable Housing in the Last Year: No       ALLERGIES:   Review of patient's allergies indicates:   Allergen Reactions    Bactrim [sulfamethoxazole-trimethoprim]     Codeine     Cortisone     Keflex [cephalexin]     Pcn [penicillins]     Sulfadiazine     Sumycin 250     Acetaminophen-codeine Other (See Comments)    Azithromycin Other (See Comments)    Contrast media Other (See Comments)     unknown    Donnatal [phenobarb-hyoscy-atropine-scop] Other (See Comments)     Unknown     Erythromycin Other (See Comments)    Flavoxate Other (See Comments)    Gentamicin Other (See Comments)    Hydrocodone-chlorpheniramine Other (See Comments)    Iodine Other (See Comments)    Norfloxacin Other (See  Comments) and Hives    Restoril [temazepam] Other (See Comments)     unknown    Sulfur Other (See Comments)    Theophylline Other (See Comments)    Vancomycin Other (See Comments)       MEDS:   Current Outpatient Medications:     acetaminophen (TYLENOL) 325 MG tablet, Take 1 tablet (325 mg total) by mouth every 6 (six) hours as needed for Pain., Disp: 30 tablet, Rfl: 11    artificial tears (ISOPTO TEARS) 0.5 % ophthalmic solution, Place 1 drop into both eyes as needed (dry eyes)., Disp: 15 mL, Rfl: 5    ascorbic acid, vitamin C, (VITAMIN C) 500 MG tablet, Take 500 mg by mouth 2 (two) times daily., Disp: , Rfl:     aspirin (ECOTRIN) 81 MG EC tablet, TAKE 1 TABLET BY MOUTH ONCE DAILY., Disp: 30 tablet, Rfl: 11    carvediloL (COREG) 3.125 MG tablet, TAKE 1 TABLET BY MOUTH TWICE A DAY WITH MEALS., Disp: 180 tablet, Rfl: 3    ferrous gluconate (FERGON) 324 MG tablet, TAKE 1 TABLET BY MOUTH ONCE DAILY WITH BREAKFAST., Disp: 90 tablet, Rfl: 3    folic acid (FOLVITE) 1 MG tablet, Take 1 tablet (1,000 mcg total) by mouth once daily., Disp: 90 tablet, Rfl: 5    nitroGLYCERIN (NITROSTAT) 0.4 MG SL tablet, Place 1 tablet (0.4 mg total) under the tongue every 5 (five) minutes as needed for Chest pain. Max 3. Call 911, Disp: 15 tablet, Rfl: 3    olopatadine (PATANOL) 0.1 % ophthalmic solution, Place 1 drop into both eyes 2 (two) times daily., Disp: 5 mL, Rfl: 5    pantoprazole (PROTONIX) 20 MG tablet, Take 1 tablet (20 mg total) by mouth daily as needed (heartburn)., Disp: 30 tablet, Rfl: 5    potassium chloride SA (K-DUR,KLOR-CON) 20 MEQ tablet, TAKE 1 TABLET BY MOUTH ONCE DAILY., Disp: 90 tablet, Rfl: 3    albuterol (PROVENTIL/VENTOLIN HFA) 90 mcg/actuation inhaler, Inhale 1-2 puffs into the lungs every 4 (four) hours as needed for Wheezing or Shortness of Breath., Disp: 18 g, Rfl: 0    atorvastatin (LIPITOR) 40 MG tablet, , Disp: , Rfl:     furosemide (LASIX) 40 MG tablet, Take 1 tablet (40 mg total) by mouth once daily.,  "Disp: 90 tablet, Rfl: 3    melatonin (MELATIN) 3 mg tablet, Take 2 tablets (6 mg total) by mouth nightly as needed for Insomnia. (Patient not taking: Reported on 4/17/2024), Disp: 60 tablet, Rfl: 5    menthol-zinc oxide (CALMOSEPTINE) 0.44-20.6 % Oint, Apply to aviva wound with dressing changes., Disp: 71 g, Rfl: 1    OBJECTIVE:   Vitals:    04/17/24 1319   BP: (!) 82/48   BP Location: Left arm   Patient Position: Sitting   BP Method: Small (Manual)   Pulse: 78   SpO2: 96%   Weight: 37.6 kg (83 lb 0.1 oz)   Height: 4' 11" (1.499 m)     Body mass index is 16.76 kg/m².      Physical Exam  Vitals and nursing note reviewed.   Constitutional:       Appearance: Normal appearance.   HENT:      Head: Normocephalic.      Right Ear: There is impacted cerumen.      Left Ear: There is impacted cerumen.      Ears:      Comments: Manually removed with curette. Tolerated well. TM normal b/l  Eyes:      General:         Right eye: No discharge.         Left eye: No discharge.      Extraocular Movements: Extraocular movements intact.   Cardiovascular:      Rate and Rhythm: Normal rate and regular rhythm.      Heart sounds: Normal heart sounds.   Pulmonary:      Effort: Pulmonary effort is normal.      Breath sounds: Normal breath sounds.   Skin:     Comments: No obvious rash on exposed skin   Neurological:      Mental Status: She is alert.   Psychiatric:         Behavior: Behavior normal.           LABS:   A1C:  Recent Labs   Lab 10/12/23  1509   Hemoglobin A1C 4.9     CBC:  Recent Labs   Lab 12/24/23  0523   WBC 6.11   RBC 4.09   Hemoglobin 11.8 L   Hematocrit 37.5   Platelets 125 L   MCV 92   MCH 28.9   MCHC 31.5 L     CMP:  Recent Labs   Lab 12/24/23  0523 12/25/23  1043 12/26/23  0525   Glucose 73   < > 81   Calcium 8.7   < > 8.8   Albumin 2.6 L  --   --    Total Protein 5.3 L  --   --    Sodium 143   < > 139   Potassium 3.7   < > 3.6   CO2 25   < > 31 H   Chloride 107   < > 99   BUN 16   < > 20 H   Creatinine 0.83   < > 0.84 "   Alkaline Phosphatase 87  --   --    ALT 7 L  --   --    AST 17  --   --    Total Bilirubin 0.5  --   --     < > = values in this interval not displayed.     LIPIDS:        TSH:            ASSESSMENT & PLAN:    Problem List Items Addressed This Visit          Neuro    Dementia without behavioral disturbance (Chronic)    Overview     - supportive care  - son Manjinder Loera  - DNR  Resident at [facility]            Cardiac/Vascular    Chronic combined systolic and diastolic heart failure (Chronic)    Overview     - 01/19/2021 echo:  EF 20-25%.  Severe left ventricular global hypokinesis.  Grade 1 left diastolic dysfunction  - medical management  - no signs of acute decompensation  - followed by cardiology         Relevant Medications    furosemide (LASIX) 40 MG tablet    Essential hypertension (Chronic)    Overview     With hypotension. Decrease lasix and message sent to Dr Jaime, her Cardiologist at [facility].             Hematology    Thrombocytopenia, unspecified (Chronic)    Overview     Stable. Continue to monitor            Oncology    Anemia, chronic disease (Chronic)    Overview     Due for labs. 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 Not painful. Family and patient opt to monitor for now          Other Visit Diagnoses       Bilateral impacted cerumen    -  successful manual removal today.               Follow up in about 1 year (around 4/17/2025) for blood pressure.      RTC/ED precautions discussed where applicable.   Encouraged patient to let me know if there are any further questions/concerns.     Advise patient/caretaker to check with insurance regarding orders to avoid unexpected fees/costs.     The patient/caretaker indicates understanding of these issues and agrees with the plan.    Dr. Elsa Puentes MD  Family  Medicine

## 2024-04-17 NOTE — LETTER
April 17, 2024      [facility]  [address]  [address]  [place] [address]  Phone: [phone]  Fax: [phone]       Patient: Gloria Zuleta   YOB: 1929  Date of Visit: 04/17/2024    To Whom It May Concern:    Concerning above patient:     -decrease furosemide to 40mg once in the morning, new rx sent to [facility]  -albuterol can be switched to only as needed, new rx sent to [facility]    Please let me know if swelling/breathing worsens after decreasing furosemide    Sincerely,    Elsa Puentes MD

## 2024-04-22 ENCOUNTER — HOSPITAL ENCOUNTER (OUTPATIENT)
Dept: WOUND CARE | Facility: HOSPITAL | Age: 89
Discharge: HOME | End: 2024-04-22
Attending: PREVENTIVE MEDICINE
Payer: MEDICARE

## 2024-04-22 VITALS
HEART RATE: 67 BPM | DIASTOLIC BLOOD PRESSURE: 49 MMHG | HEIGHT: 59 IN | WEIGHT: 82.88 LBS | BODY MASS INDEX: 16.71 KG/M2 | TEMPERATURE: 98 F | SYSTOLIC BLOOD PRESSURE: 96 MMHG

## 2024-04-22 DIAGNOSIS — S81.812A NONINFECTED SKIN TEAR OF LEFT LOWER EXTREMITY, INITIAL ENCOUNTER: ICD-10-CM

## 2024-04-22 DIAGNOSIS — R53.81 DEBILITY: ICD-10-CM

## 2024-04-22 DIAGNOSIS — L98.492 CHEST WALL ULCER, WITH FAT LAYER EXPOSED: ICD-10-CM

## 2024-04-22 DIAGNOSIS — M86.68 CHRONIC OSTEOMYELITIS OF SACRUM: ICD-10-CM

## 2024-04-22 DIAGNOSIS — L89.154 PRESSURE INJURY OF SACRAL REGION, STAGE 4: Primary | ICD-10-CM

## 2024-04-22 PROCEDURE — 99215 OFFICE O/P EST HI 40 MIN: CPT | Mod: HCNC

## 2024-04-22 NOTE — PROGRESS NOTES
"                     [facility]    Subjective:       Patient ID: Gloria Zuleta is a 89 y.o. female.    Chief Complaint: Wound Check and Wound Care    Follow up related to sacral wound. Patient pleasantly confused, family member at bedside. Patient's family states that the sacral wound had "a new spot open up", that the right chest opened up last week, and that last week she was informed of a new wound to the right lower leg. She put a honey alginate and a bandage on the right chest, and is not sure what the assisted living put on the leg but she left both in place until she came today. States that the home has been leaving the patient sitting up for extended periods, concerned about lack of turning.         Review of Systems   All other systems reviewed and are negative.        Objective:     Vitals:    04/22/24 1042   BP: (!) 96/49   Pulse: 67   Temp: 98.1 °F (36.7 °C)         Physical Exam       Altered Skin Integrity 04/18/21 1550 Coccyx Full thickness tissue loss with exposed bone, tendon, or muscle. Often includes undermining and tunneling. May extend into muscle and/or supporting structures. (Active)   04/18/21 1550   Altered Skin Integrity Present on Admission - Did Patient arrive to the hospital with altered skin?: yes   Side:    Orientation:    Location: Coccyx   Wound Number:    Is this injury device related?:    Primary Wound Type:    Description of Altered Skin Integrity: Full thickness tissue loss with exposed bone, tendon, or muscle. Often includes undermining and tunneling. May extend into muscle and/or supporting structures.   Ankle-Brachial Index:    Pulses:    Removal Indication and Assessment:    Wound Outcome: Palliative   (Retired) Wound Length (cm):    (Retired) Wound Width (cm):    (Retired) Depth (cm):    Wound Description (Comments):    Removal Indications:    Wound Image    04/22/24 1000   Description of Altered Skin Integrity Full thickness tissue loss. " Subcutaneous fat may be visible but bone, tendon or muscle are not exposed 04/22/24 1000   Dressing Appearance Intact;Moist drainage 04/22/24 1000   Drainage Amount Moderate 04/22/24 1000   Drainage Characteristics/Odor Serosanguineous 04/22/24 1000   Appearance Red;Smooth;Moist;Pink;Not granulating 04/22/24 1000   Tissue loss description Full thickness 04/22/24 1000   Red (%), Wound Tissue Color 100 % 04/22/24 1000   Periwound Area Intact;Dry;Indurated 04/22/24 1000   Wound Edges Rolled/closed;Open 04/22/24 1000   Wound Length (cm) 2.6 cm 04/22/24 1000   Wound Width (cm) 1.4 cm 04/22/24 1000   Wound Depth (cm) 1.8 cm 04/22/24 1000   Wound Volume (cm^3) 6.552 cm^3 04/22/24 1000   Wound Surface Area (cm^2) 3.64 cm^2 04/22/24 1000   Undermining (depth (cm)/location) 3.6cm@9-4 04/22/24 1000   Care Cleansed with:;Antimicrobial agent 04/22/24 1000   Dressing Applied;Foam;Island/border 04/22/24 1000   Packing packed with;hydrofiber/alginate 04/22/24 1000   Packing Inserted  1 04/22/24 1000   Packing Removed 1 04/22/24 1000   Periwound Care Absorptive dressing applied;Dry periwound area maintained 04/22/24 1000   Dressing Change Due 04/24/24 04/22/24 1000            Wound 04/22/24 1000 Traumatic Right lateral Leg (Active)   04/22/24 1000   Present on Original Admission: Y   Primary Wound Type: Traumatic   Side: Right   Orientation: lateral   Location: Leg   Wound Approximate Age at First Assessment (Weeks): 1 weeks   Wound Number:    Is this injury device related?: No   Incision Type:    Closure Method:    Wound Description (Comments):    Type:    Additional Comments:    Ankle-Brachial Index:    Pulses:    Removal Indication and Assessment:    Wound Outcome:    Wound Image   04/22/24 1000   Dressing Appearance Intact;Moist drainage 04/22/24 1000   Drainage Amount Moderate 04/22/24 1000   Drainage Characteristics/Odor Sanguineous 04/22/24 1000   Appearance Red;Moist 04/22/24 1000   Tissue loss description Full thickness  04/22/24 1000   Red (%), Wound Tissue Color 100 % 04/22/24 1000   Periwound Area Eng;Intact;Dry 04/22/24 1000   Wound Edges Jagged 04/22/24 1000   Wound Length (cm) 4.5 cm 04/22/24 1000   Wound Width (cm) 1.2 cm 04/22/24 1000   Wound Depth (cm) 0.1 cm 04/22/24 1000   Wound Volume (cm^3) 0.54 cm^3 04/22/24 1000   Wound Surface Area (cm^2) 5.4 cm^2 04/22/24 1000   Care Cleansed with:;Antimicrobial agent 04/22/24 1000   Dressing Applied;Non-adherent;Foam;Island/border;Tubular bandage 04/22/24 1000   Periwound Care Absorptive dressing applied;Dry periwound area maintained 04/22/24 1000   Compression Tubular elasticized bandage 04/22/24 1000   Dressing Change Due 04/24/24 04/22/24 1000            Wound 04/22/24 1000 Other (comment) Right Chest (Active)   04/22/24 1000   Present on Original Admission: Y   Primary Wound Type: Other   Side: Right   Orientation:    Location: Chest   Wound Approximate Age at First Assessment (Weeks): 1 weeks   Wound Number:    Is this injury device related?: No   Incision Type:    Closure Method:    Wound Description (Comments):    Type:    Additional Comments:    Ankle-Brachial Index:    Pulses:    Removal Indication and Assessment:    Wound Outcome:    Wound Image   04/22/24 1000   Dressing Appearance Intact;Moist drainage 04/22/24 1000   Drainage Amount Moderate 04/22/24 1000   Drainage Characteristics/Odor Sanguineous 04/22/24 1000   Appearance Red;Maroon;Moist 04/22/24 1000   Tissue loss description Full thickness 04/22/24 1000   Red (%), Wound Tissue Color 100 % 04/22/24 1000   Periwound Area Intact;Dry;Swelling 04/22/24 1000   Wound Edges Open 04/22/24 1000   Wound Length (cm) 1.8 cm 04/22/24 1000   Wound Width (cm) 1.8 cm 04/22/24 1000   Wound Depth (cm) 0.3 cm 04/22/24 1000   Wound Volume (cm^3) 0.972 cm^3 04/22/24 1000   Wound Surface Area (cm^2) 3.24 cm^2 04/22/24 1000   Care Cleansed with:;Antimicrobial agent 04/22/24 1000   Dressing Applied;Non-adherent;Foam;Island/border  04/22/24 1000   Periwound Care Absorptive dressing applied;Dry periwound area maintained 04/22/24 1000   Dressing Change Due 04/24/24 04/22/24 1000         Assessment/Plan:         ICD-10-CM ICD-9-CM   1. Pressure injury of sacral region, stage 4  L89.154 707.03     707.24   2. Chest wall ulcer, with fat layer exposed  L98.492 707.8   3. Chronic osteomyelitis of sacrum  M86.68 730.18   4. Debility  R53.81 799.3   5. Noninfected skin tear of left lower extremity, initial encounter  S81.812A 891.0           Tissue pathology and/or culture taken:  [] Yes [x] No   Sharp debridement performed:   [] Yes [x] No   Labs ordered this visit:   [] Yes [x] No   Imaging ordered this visit:   [] Yes [x] No           Orders Placed This Encounter   Procedures    Change dressing     Sacral Wound  Cleanse wound with: Vashe soak x 2 min, rinse with Normal Saline  Lidocaine:PRN  Silver nitrate:PRN  Periwound care:  Betamethasone to periwound redness prn  Primary dressing: silver alginate rope gently pack to wound bed and undermining.  Secondary dressing: bordered foam dressing 4x4  Offloading: Roho Cushion while sitting in wheel chair, Low airloss mattress while lying in bed. Change position 2 hours while lying in bed.    Right chest wound and right lower leg wound:  Cleanse wound with: vashe  Lidocaine: prn  Silver nitrate: prn  Periwound care: maintain dry periwound  Primary dressing: xeroform (ok to use either xeroform or honey alginate to right chest wound)  Secondary dressing: mepilex border    Edema: tubigrip F to bilateral legs from toes to knees    Frequency: Monday, Wednesday, Friday and prn    Home Health: Home Health to provide skilled wound care as above weekly.  Please leave supplies for family to change dressing on other days.      Other orders: At night please keep head of bed at 30 degrees.    Follow-up: 3 months with Dr Reyes        Follow up in about 3 months (around 7/22/2024) for Dr Reyes.            This includes  face to face time and non-face to face time preparing to see the patient (eg, review of tests), obtaining and/or reviewing separately obtained history, documenting clinical information in the electronic or other health record, independently interpreting results and communicating results to the patient/family/caregiver, or care coordinator.

## 2024-05-30 PROCEDURE — G0179 MD RECERTIFICATION HHA PT: HCPCS | Mod: ,,, | Performed by: FAMILY MEDICINE

## 2024-06-05 ENCOUNTER — TELEPHONE (OUTPATIENT)
Dept: FAMILY MEDICINE | Facility: CLINIC | Age: 89
End: 2024-06-05
Payer: MEDICARE

## 2024-06-05 DIAGNOSIS — G47.09 OTHER INSOMNIA: ICD-10-CM

## 2024-06-05 NOTE — TELEPHONE ENCOUNTER
----- Message from Daniel Saba sent at 6/5/2024  4:08 PM CDT -----  Type:  Call    Who Called:Helga (daughter in law/ care giver)  Does the patient know what this is regarding?:anxiety / sleeping condition   Would the patient rather a call back or a response via [facility]? Call   Best Call Back Number:[phone]  Additional Information:

## 2024-06-05 NOTE — TELEPHONE ENCOUNTER
Past 4 week lots of anxiety and some insomnia want to start off on a low dose of melatonin no narcotics.

## 2024-06-06 RX ORDER — TALC
6 POWDER (GRAM) TOPICAL NIGHTLY
Qty: 180 TABLET | Refills: 3 | Status: SHIPPED | OUTPATIENT
Start: 2024-06-06

## 2024-06-06 NOTE — TELEPHONE ENCOUNTER
They can start melatonin 3 to 6mg nightly as tolerated. This is over the counter. If she is in a facility and needs an rx let me know

## 2024-06-12 ENCOUNTER — DOCUMENT SCAN (OUTPATIENT)
Dept: HOME HEALTH SERVICES | Facility: HOSPITAL | Age: 89
End: 2024-06-12
Payer: MEDICARE

## 2024-06-14 ENCOUNTER — TELEPHONE (OUTPATIENT)
Dept: FAMILY MEDICINE | Facility: CLINIC | Age: 89
End: 2024-06-14
Payer: MEDICARE

## 2024-06-14 PROBLEM — J96.11 CHRONIC RESPIRATORY FAILURE WITH HYPOXIA: Status: ACTIVE | Noted: 2024-06-14

## 2024-06-14 PROBLEM — D69.6 THROMBOCYTOPENIA, UNSPECIFIED: Chronic | Status: RESOLVED | Noted: 2021-05-13 | Resolved: 2024-06-14

## 2024-06-14 NOTE — TELEPHONE ENCOUNTER
----- Message from Brandon Rhodes sent at 6/14/2024  2:34 PM CDT -----  Contact: Helga (daughter in law)  Type: Requesting to speak with nurse        Who Called: Helga (daughter in law)   Regarding: having swelling in legs and trouble breathing. Taking her to ER. Please advise.   Would the patient rather a call back or a response via [facility]? Call back  Best Call Back Number: [phone]  Additional Information:

## 2024-06-19 ENCOUNTER — PATIENT OUTREACH (OUTPATIENT)
Dept: ADMINISTRATIVE | Facility: CLINIC | Age: 89
End: 2024-06-19
Payer: MEDICARE

## 2024-06-20 ENCOUNTER — EXTERNAL HOME HEALTH (OUTPATIENT)
Dept: HOME HEALTH SERVICES | Facility: HOSPITAL | Age: 89
End: 2024-06-20
Payer: MEDICARE

## 2024-06-24 ENCOUNTER — TELEPHONE (OUTPATIENT)
Dept: FAMILY MEDICINE | Facility: CLINIC | Age: 89
End: 2024-06-24
Payer: MEDICARE

## 2024-06-24 DIAGNOSIS — J96.11 CHRONIC RESPIRATORY FAILURE WITH HYPOXIA: Primary | ICD-10-CM

## 2024-06-24 DIAGNOSIS — F03.90 DEMENTIA WITHOUT BEHAVIORAL DISTURBANCE: Chronic | ICD-10-CM

## 2024-06-24 DIAGNOSIS — I50.42 CHRONIC COMBINED SYSTOLIC AND DIASTOLIC HEART FAILURE: ICD-10-CM

## 2024-06-24 NOTE — TELEPHONE ENCOUNTER
Spoke with caregiver ms awan she is requesting a call to discuss Ms Castro decline in health she cannot get out of beds has wounds and oxygen is sitting at 83. Would like a call back in the pm.

## 2024-06-25 NOTE — TELEPHONE ENCOUNTER
Spoke with nurse at [facility] about hospice care she says that you can put an order for [facility] the family can choose who they want and the order also has to be faxed to the home.

## 2024-06-26 ENCOUNTER — TELEPHONE (OUTPATIENT)
Dept: FAMILY MEDICINE | Facility: CLINIC | Age: 89
End: 2024-06-26
Payer: MEDICARE

## 2024-06-26 DIAGNOSIS — J96.11 CHRONIC RESPIRATORY FAILURE WITH HYPOXIA: ICD-10-CM

## 2024-06-26 DIAGNOSIS — F03.90 DEMENTIA WITHOUT BEHAVIORAL DISTURBANCE: Primary | Chronic | ICD-10-CM

## 2024-06-26 NOTE — TELEPHONE ENCOUNTER
----- Message from Brandonozzie Rhodes sent at 6/26/2024  1:02 PM CDT -----  Contact: PT DAUGHTER IN LAW  Type: Requesting to speak with nurse        Who Called: JASIEL   Regarding: [facility]. WOULD LIKE AN ORDER TO GO TO [facility]   Would the patient rather a call back or a response via [facility]? Call back  Best Call Back Number: [phone]

## 2024-06-26 NOTE — TELEPHONE ENCOUNTER
----- Message from Brandonozzie Rhodes sent at 6/26/2024  3:08 PM CDT -----  Type: Requesting to speak with nurse        Who Called: PT  Regarding: need a call back Joanna Ashby MA  Would the patient rather a call back or a response via [facility]? Call back  Best Call Back Number: [phone]  Additional Information:

## 2024-06-26 NOTE — TELEPHONE ENCOUNTER
----- Message from Brandonozzie Rhodes sent at 6/26/2024  1:02 PM CDT -----  Contact: PT DAUGHTER IN LAW  Type: Requesting to speak with nurse        Who Called: JASIEL   Regarding: [facility]. WOULD LIKE AN ORDER TO GO TO [facility]   Would the patient rather a call back or a response via [facility]? Call back  Best Call Back Number: [phone]

## 2024-06-26 NOTE — TELEPHONE ENCOUNTER
----- Message from Brandonozzie Rhodes sent at 6/26/2024  1:02 PM CDT -----  Contact: PT DAUGHTER IN LAW  Type: Requesting to speak with nurse        Who Called: JASIEL   Regarding: [facility]. WOULD LIKE AN ORDER TO GO TO [facility]   Would the patient rather a call back or a response via [facility]? Call back  Best Call Back Number: [phone]

## 2024-08-26 RX ORDER — OLOPATADINE HYDROCHLORIDE 1 MG/ML
1 SOLUTION/ DROPS OPHTHALMIC 2 TIMES DAILY
Qty: 5 ML | Refills: 5 | Status: SHIPPED | OUTPATIENT
Start: 2024-08-26 | End: 2025-08-26

## 2024-08-27 RX ORDER — ERYTHROMYCIN 5 MG/G
OINTMENT OPHTHALMIC
Qty: 3.5 G | Refills: 0 | Status: SHIPPED | OUTPATIENT
Start: 2024-08-27

## 2024-09-04 DIAGNOSIS — D63.8 ANEMIA, CHRONIC DISEASE: ICD-10-CM

## 2024-09-05 RX ORDER — FERROUS GLUCONATE 324(38)MG
1 TABLET ORAL
Qty: 90 TABLET | Refills: 3 | Status: SHIPPED | OUTPATIENT
Start: 2024-09-05